# Patient Record
Sex: MALE | Race: WHITE | NOT HISPANIC OR LATINO | Employment: OTHER | URBAN - NONMETROPOLITAN AREA
[De-identification: names, ages, dates, MRNs, and addresses within clinical notes are randomized per-mention and may not be internally consistent; named-entity substitution may affect disease eponyms.]

---

## 2018-05-21 ENCOUNTER — APPOINTMENT (OUTPATIENT)
Dept: GENERAL RADIOLOGY | Facility: HOSPITAL | Age: 68
End: 2018-05-21

## 2018-05-21 ENCOUNTER — ANESTHESIA (OUTPATIENT)
Dept: PERIOP | Facility: HOSPITAL | Age: 68
End: 2018-05-21

## 2018-05-21 ENCOUNTER — HOSPITAL ENCOUNTER (INPATIENT)
Facility: HOSPITAL | Age: 68
LOS: 4 days | Discharge: HOME HEALTH | End: 2018-05-25
Attending: EMERGENCY MEDICINE | Admitting: ORTHOPAEDIC SURGERY

## 2018-05-21 ENCOUNTER — ANESTHESIA EVENT (OUTPATIENT)
Dept: PERIOP | Facility: HOSPITAL | Age: 68
End: 2018-05-21

## 2018-05-21 DIAGNOSIS — S72.355B: ICD-10-CM

## 2018-05-21 DIAGNOSIS — Z74.09 IMPAIRED MOBILITY: ICD-10-CM

## 2018-05-21 DIAGNOSIS — S81.832A: ICD-10-CM
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
## 2018-05-21 DIAGNOSIS — Z78.9 DECREASED ACTIVITIES OF DAILY LIVING (ADL): ICD-10-CM

## 2018-05-21 DIAGNOSIS — S72.352B: ICD-10-CM

## 2018-05-21 LAB
ABO GROUP BLD: NORMAL
ALBUMIN SERPL-MCNC: 4.1 G/DL (ref 3.5–5)
ALBUMIN/GLOB SERPL: 1.4 G/DL (ref 1.1–2.5)
ALP SERPL-CCNC: 76 U/L (ref 24–120)
ALT SERPL W P-5'-P-CCNC: 25 U/L (ref 0–54)
ANION GAP SERPL CALCULATED.3IONS-SCNC: 14 MMOL/L (ref 4–13)
AST SERPL-CCNC: 39 U/L (ref 7–45)
BASOPHILS # BLD AUTO: 0.08 10*3/MM3 (ref 0–0.2)
BASOPHILS NFR BLD AUTO: 0.6 % (ref 0–2)
BILIRUB SERPL-MCNC: 0.5 MG/DL (ref 0.1–1)
BLD GP AB SCN SERPL QL: NEGATIVE
BUN BLD-MCNC: 19 MG/DL (ref 5–21)
BUN/CREAT SERPL: 12 (ref 7–25)
CALCIUM SPEC-SCNC: 9.1 MG/DL (ref 8.4–10.4)
CHLORIDE SERPL-SCNC: 100 MMOL/L (ref 98–110)
CO2 SERPL-SCNC: 23 MMOL/L (ref 24–31)
CREAT BLD-MCNC: 1.58 MG/DL (ref 0.5–1.4)
DEPRECATED RDW RBC AUTO: 41.8 FL (ref 40–54)
EOSINOPHIL # BLD AUTO: 0.13 10*3/MM3 (ref 0–0.7)
EOSINOPHIL NFR BLD AUTO: 1 % (ref 0–4)
ERYTHROCYTE [DISTWIDTH] IN BLOOD BY AUTOMATED COUNT: 13.5 % (ref 12–15)
ETHANOL UR QL: <0.01 %
GFR SERPL CREATININE-BSD FRML MDRD: 44 ML/MIN/1.73
GLOBULIN UR ELPH-MCNC: 2.9 GM/DL
GLUCOSE BLD-MCNC: 143 MG/DL (ref 70–100)
HCT VFR BLD AUTO: 39.9 % (ref 40–52)
HGB BLD-MCNC: 13.6 G/DL (ref 14–18)
HOLD SPECIMEN: NORMAL
HOLD SPECIMEN: NORMAL
IMM GRANULOCYTES # BLD: 0.06 10*3/MM3 (ref 0–0.03)
IMM GRANULOCYTES NFR BLD: 0.5 % (ref 0–5)
INR PPP: 1.01 (ref 0.91–1.09)
LYMPHOCYTES # BLD AUTO: 1.88 10*3/MM3 (ref 0.72–4.86)
LYMPHOCYTES NFR BLD AUTO: 14.6 % (ref 15–45)
MCH RBC QN AUTO: 28.9 PG (ref 28–32)
MCHC RBC AUTO-ENTMCNC: 34.1 G/DL (ref 33–36)
MCV RBC AUTO: 84.9 FL (ref 82–95)
MONOCYTES # BLD AUTO: 0.97 10*3/MM3 (ref 0.19–1.3)
MONOCYTES NFR BLD AUTO: 7.5 % (ref 4–12)
NEUTROPHILS # BLD AUTO: 9.73 10*3/MM3 (ref 1.87–8.4)
NEUTROPHILS NFR BLD AUTO: 75.8 % (ref 39–78)
NRBC BLD MANUAL-RTO: 0 /100 WBC (ref 0–0)
PLATELET # BLD AUTO: 237 10*3/MM3 (ref 130–400)
PMV BLD AUTO: 10.5 FL (ref 6–12)
POTASSIUM BLD-SCNC: 3.4 MMOL/L (ref 3.5–5.3)
PROT SERPL-MCNC: 7 G/DL (ref 6.3–8.7)
PROTHROMBIN TIME: 13.6 SECONDS (ref 11.9–14.6)
RBC # BLD AUTO: 4.7 10*6/MM3 (ref 4.8–5.9)
RH BLD: POSITIVE
SODIUM BLD-SCNC: 137 MMOL/L (ref 135–145)
T&S EXPIRATION DATE: NORMAL
WBC NRBC COR # BLD: 12.85 10*3/MM3 (ref 4.8–10.8)
WHOLE BLOOD HOLD SPECIMEN: NORMAL
WHOLE BLOOD HOLD SPECIMEN: NORMAL

## 2018-05-21 PROCEDURE — 25010000003 CEFAZOLIN PER 500 MG: Performed by: NURSE ANESTHETIST, CERTIFIED REGISTERED

## 2018-05-21 PROCEDURE — C1713 ANCHOR/SCREW BN/BN,TIS/BN: HCPCS | Performed by: ORTHOPAEDIC SURGERY

## 2018-05-21 PROCEDURE — 25010000002 GENTAMICIN PER 80 MG: Performed by: EMERGENCY MEDICINE

## 2018-05-21 PROCEDURE — 71045 X-RAY EXAM CHEST 1 VIEW: CPT

## 2018-05-21 PROCEDURE — 0QS906Z REPOSITION LEFT FEMORAL SHAFT WITH INTRAMEDULLARY INTERNAL FIXATION DEVICE, OPEN APPROACH: ICD-10-PCS | Performed by: ORTHOPAEDIC SURGERY

## 2018-05-21 PROCEDURE — 86901 BLOOD TYPING SEROLOGIC RH(D): CPT | Performed by: EMERGENCY MEDICINE

## 2018-05-21 PROCEDURE — 80053 COMPREHEN METABOLIC PANEL: CPT | Performed by: EMERGENCY MEDICINE

## 2018-05-21 PROCEDURE — 90715 TDAP VACCINE 7 YRS/> IM: CPT | Performed by: EMERGENCY MEDICINE

## 2018-05-21 PROCEDURE — 72170 X-RAY EXAM OF PELVIS: CPT

## 2018-05-21 PROCEDURE — 76000 FLUOROSCOPY <1 HR PHYS/QHP: CPT

## 2018-05-21 PROCEDURE — 94799 UNLISTED PULMONARY SVC/PX: CPT

## 2018-05-21 PROCEDURE — 85025 COMPLETE CBC W/AUTO DIFF WBC: CPT | Performed by: EMERGENCY MEDICINE

## 2018-05-21 PROCEDURE — 25010000002 MORPHINE SULFATE (PF) 2 MG/ML SOLUTION: Performed by: ANESTHESIOLOGY

## 2018-05-21 PROCEDURE — 99285 EMERGENCY DEPT VISIT HI MDM: CPT

## 2018-05-21 PROCEDURE — 90471 IMMUNIZATION ADMIN: CPT | Performed by: EMERGENCY MEDICINE

## 2018-05-21 PROCEDURE — 25010000002 PROPOFOL 10 MG/ML EMULSION: Performed by: NURSE ANESTHETIST, CERTIFIED REGISTERED

## 2018-05-21 PROCEDURE — 80307 DRUG TEST PRSMV CHEM ANLYZR: CPT | Performed by: EMERGENCY MEDICINE

## 2018-05-21 PROCEDURE — 25010000002 FENTANYL CITRATE (PF) 100 MCG/2ML SOLUTION: Performed by: ANESTHESIOLOGY

## 2018-05-21 PROCEDURE — 73552 X-RAY EXAM OF FEMUR 2/>: CPT

## 2018-05-21 PROCEDURE — 86850 RBC ANTIBODY SCREEN: CPT | Performed by: EMERGENCY MEDICINE

## 2018-05-21 PROCEDURE — 25010000003 CEFAZOLIN PER 500 MG: Performed by: EMERGENCY MEDICINE

## 2018-05-21 PROCEDURE — C1769 GUIDE WIRE: HCPCS | Performed by: ORTHOPAEDIC SURGERY

## 2018-05-21 PROCEDURE — 25010000002 FENTANYL CITRATE (PF) 250 MCG/5ML SOLUTION: Performed by: NURSE ANESTHETIST, CERTIFIED REGISTERED

## 2018-05-21 PROCEDURE — 25010000002 FENTANYL CITRATE (PF) 100 MCG/2ML SOLUTION: Performed by: EMERGENCY MEDICINE

## 2018-05-21 PROCEDURE — 25010000002 TDAP 5-2.5-18.5 LF-MCG/0.5 SUSPENSION: Performed by: EMERGENCY MEDICINE

## 2018-05-21 PROCEDURE — 85610 PROTHROMBIN TIME: CPT | Performed by: EMERGENCY MEDICINE

## 2018-05-21 PROCEDURE — 86900 BLOOD TYPING SEROLOGIC ABO: CPT | Performed by: EMERGENCY MEDICINE

## 2018-05-21 DEVICE — IMPLANTABLE DEVICE: Type: IMPLANTABLE DEVICE | Status: FUNCTIONAL

## 2018-05-21 DEVICE — SCRW LK STRDRV TI 5X 50MM STRL: Type: IMPLANTABLE DEVICE | Status: FUNCTIONAL

## 2018-05-21 DEVICE — BLD FEM FIX HELI TFN ADV 95MM STRL: Type: IMPLANTABLE DEVICE | Status: FUNCTIONAL

## 2018-05-21 RX ORDER — DOCUSATE SODIUM 100 MG/1
100 CAPSULE, LIQUID FILLED ORAL 2 TIMES DAILY PRN
Status: DISCONTINUED | OUTPATIENT
Start: 2018-05-21 | End: 2018-05-25 | Stop reason: HOSPADM

## 2018-05-21 RX ORDER — ONDANSETRON 2 MG/ML
4 INJECTION INTRAMUSCULAR; INTRAVENOUS EVERY 6 HOURS PRN
Status: DISCONTINUED | OUTPATIENT
Start: 2018-05-21 | End: 2018-05-25 | Stop reason: HOSPADM

## 2018-05-21 RX ORDER — PROPOFOL 10 MG/ML
VIAL (ML) INTRAVENOUS AS NEEDED
Status: DISCONTINUED | OUTPATIENT
Start: 2018-05-21 | End: 2018-05-21 | Stop reason: SURG

## 2018-05-21 RX ORDER — ONDANSETRON 4 MG/1
4 TABLET, ORALLY DISINTEGRATING ORAL EVERY 6 HOURS PRN
Status: DISCONTINUED | OUTPATIENT
Start: 2018-05-21 | End: 2018-05-25 | Stop reason: HOSPADM

## 2018-05-21 RX ORDER — METOCLOPRAMIDE HYDROCHLORIDE 5 MG/ML
5 INJECTION INTRAMUSCULAR; INTRAVENOUS
Status: DISCONTINUED | OUTPATIENT
Start: 2018-05-21 | End: 2018-05-21 | Stop reason: HOSPADM

## 2018-05-21 RX ORDER — HYDRALAZINE HYDROCHLORIDE 20 MG/ML
5 INJECTION INTRAMUSCULAR; INTRAVENOUS
Status: DISCONTINUED | OUTPATIENT
Start: 2018-05-21 | End: 2018-05-21 | Stop reason: HOSPADM

## 2018-05-21 RX ORDER — ONDANSETRON 4 MG/1
4 TABLET, FILM COATED ORAL EVERY 6 HOURS PRN
Status: DISCONTINUED | OUTPATIENT
Start: 2018-05-21 | End: 2018-05-25 | Stop reason: HOSPADM

## 2018-05-21 RX ORDER — LISINOPRIL 5 MG/1
5 TABLET ORAL
Status: DISCONTINUED | OUTPATIENT
Start: 2018-05-22 | End: 2018-05-22

## 2018-05-21 RX ORDER — SODIUM CHLORIDE, SODIUM LACTATE, POTASSIUM CHLORIDE, CALCIUM CHLORIDE 600; 310; 30; 20 MG/100ML; MG/100ML; MG/100ML; MG/100ML
100 INJECTION, SOLUTION INTRAVENOUS CONTINUOUS
Status: DISCONTINUED | OUTPATIENT
Start: 2018-05-21 | End: 2018-05-25 | Stop reason: HOSPADM

## 2018-05-21 RX ORDER — FLUMAZENIL 0.1 MG/ML
0.2 INJECTION INTRAVENOUS AS NEEDED
Status: DISCONTINUED | OUTPATIENT
Start: 2018-05-21 | End: 2018-05-21 | Stop reason: HOSPADM

## 2018-05-21 RX ORDER — CEFAZOLIN SODIUM 1 G/3ML
INJECTION, POWDER, FOR SOLUTION INTRAMUSCULAR; INTRAVENOUS AS NEEDED
Status: DISCONTINUED | OUTPATIENT
Start: 2018-05-21 | End: 2018-05-21 | Stop reason: SURG

## 2018-05-21 RX ORDER — SODIUM CHLORIDE 0.9 % (FLUSH) 0.9 %
1-10 SYRINGE (ML) INJECTION AS NEEDED
Status: CANCELLED | OUTPATIENT
Start: 2018-05-21

## 2018-05-21 RX ORDER — SENNA AND DOCUSATE SODIUM 50; 8.6 MG/1; MG/1
2 TABLET, FILM COATED ORAL 2 TIMES DAILY PRN
Status: DISCONTINUED | OUTPATIENT
Start: 2018-05-21 | End: 2018-05-25 | Stop reason: HOSPADM

## 2018-05-21 RX ORDER — SODIUM CHLORIDE 0.9 % (FLUSH) 0.9 %
10 SYRINGE (ML) INJECTION AS NEEDED
Status: DISCONTINUED | OUTPATIENT
Start: 2018-05-21 | End: 2018-05-25 | Stop reason: HOSPADM

## 2018-05-21 RX ORDER — OXYCODONE AND ACETAMINOPHEN 7.5; 325 MG/1; MG/1
1 TABLET ORAL EVERY 4 HOURS PRN
Status: DISCONTINUED | OUTPATIENT
Start: 2018-05-21 | End: 2018-05-25 | Stop reason: HOSPADM

## 2018-05-21 RX ORDER — FENTANYL CITRATE 50 UG/ML
25 INJECTION, SOLUTION INTRAMUSCULAR; INTRAVENOUS AS NEEDED
Status: DISCONTINUED | OUTPATIENT
Start: 2018-05-21 | End: 2018-05-21 | Stop reason: HOSPADM

## 2018-05-21 RX ORDER — MAGNESIUM HYDROXIDE 1200 MG/15ML
LIQUID ORAL AS NEEDED
Status: DISCONTINUED | OUTPATIENT
Start: 2018-05-21 | End: 2018-05-21 | Stop reason: HOSPADM

## 2018-05-21 RX ORDER — MORPHINE SULFATE 4 MG/ML
4 INJECTION, SOLUTION INTRAMUSCULAR; INTRAVENOUS
Status: DISCONTINUED | OUTPATIENT
Start: 2018-05-21 | End: 2018-05-23 | Stop reason: ALTCHOICE

## 2018-05-21 RX ORDER — OXYCODONE HCL 20 MG/1
20 TABLET, FILM COATED, EXTENDED RELEASE ORAL EVERY 12 HOURS SCHEDULED
Status: DISCONTINUED | OUTPATIENT
Start: 2018-05-21 | End: 2018-05-25 | Stop reason: HOSPADM

## 2018-05-21 RX ORDER — FENTANYL CITRATE 50 UG/ML
INJECTION, SOLUTION INTRAMUSCULAR; INTRAVENOUS
Status: COMPLETED
Start: 2018-05-21 | End: 2018-05-21

## 2018-05-21 RX ORDER — NALOXONE HCL 0.4 MG/ML
0.4 VIAL (ML) INJECTION
Status: DISCONTINUED | OUTPATIENT
Start: 2018-05-21 | End: 2018-05-23 | Stop reason: ALTCHOICE

## 2018-05-21 RX ORDER — PHENYLEPHRINE HCL IN 0.9% NACL 0.8MG/10ML
SYRINGE (ML) INTRAVENOUS AS NEEDED
Status: DISCONTINUED | OUTPATIENT
Start: 2018-05-21 | End: 2018-05-21 | Stop reason: SURG

## 2018-05-21 RX ORDER — SODIUM CHLORIDE 0.9 % (FLUSH) 0.9 %
1-10 SYRINGE (ML) INJECTION AS NEEDED
Status: DISCONTINUED | OUTPATIENT
Start: 2018-05-21 | End: 2018-05-25 | Stop reason: HOSPADM

## 2018-05-21 RX ORDER — LABETALOL HYDROCHLORIDE 5 MG/ML
5 INJECTION, SOLUTION INTRAVENOUS
Status: DISCONTINUED | OUTPATIENT
Start: 2018-05-21 | End: 2018-05-21 | Stop reason: HOSPADM

## 2018-05-21 RX ORDER — NALOXONE HCL 0.4 MG/ML
0.04 VIAL (ML) INJECTION AS NEEDED
Status: DISCONTINUED | OUTPATIENT
Start: 2018-05-21 | End: 2018-05-21 | Stop reason: HOSPADM

## 2018-05-21 RX ORDER — MORPHINE SULFATE 10 MG/ML
6 INJECTION INTRAMUSCULAR; INTRAVENOUS; SUBCUTANEOUS
Status: DISCONTINUED | OUTPATIENT
Start: 2018-05-21 | End: 2018-05-23 | Stop reason: CLARIF

## 2018-05-21 RX ORDER — ONDANSETRON 2 MG/ML
4 INJECTION INTRAMUSCULAR; INTRAVENOUS AS NEEDED
Status: DISCONTINUED | OUTPATIENT
Start: 2018-05-21 | End: 2018-05-21 | Stop reason: HOSPADM

## 2018-05-21 RX ORDER — FAMOTIDINE 20 MG/1
40 TABLET, FILM COATED ORAL DAILY
Status: DISCONTINUED | OUTPATIENT
Start: 2018-05-22 | End: 2018-05-25 | Stop reason: HOSPADM

## 2018-05-21 RX ORDER — MEPERIDINE HYDROCHLORIDE 50 MG/ML
12.5 INJECTION INTRAMUSCULAR; INTRAVENOUS; SUBCUTANEOUS
Status: DISCONTINUED | OUTPATIENT
Start: 2018-05-21 | End: 2018-05-21 | Stop reason: HOSPADM

## 2018-05-21 RX ORDER — DIAZEPAM 5 MG/1
5 TABLET ORAL EVERY 6 HOURS PRN
Status: DISCONTINUED | OUTPATIENT
Start: 2018-05-21 | End: 2018-05-25 | Stop reason: HOSPADM

## 2018-05-21 RX ORDER — FENTANYL CITRATE 50 UG/ML
100 INJECTION, SOLUTION INTRAMUSCULAR; INTRAVENOUS ONCE
Status: COMPLETED | OUTPATIENT
Start: 2018-05-21 | End: 2018-05-21

## 2018-05-21 RX ORDER — IPRATROPIUM BROMIDE AND ALBUTEROL SULFATE 2.5; .5 MG/3ML; MG/3ML
3 SOLUTION RESPIRATORY (INHALATION) ONCE AS NEEDED
Status: DISCONTINUED | OUTPATIENT
Start: 2018-05-21 | End: 2018-05-21 | Stop reason: HOSPADM

## 2018-05-21 RX ORDER — FENTANYL CITRATE 50 UG/ML
INJECTION, SOLUTION INTRAMUSCULAR; INTRAVENOUS AS NEEDED
Status: DISCONTINUED | OUTPATIENT
Start: 2018-05-21 | End: 2018-05-21 | Stop reason: SURG

## 2018-05-21 RX ORDER — ROCURONIUM BROMIDE 10 MG/ML
INJECTION, SOLUTION INTRAVENOUS AS NEEDED
Status: DISCONTINUED | OUTPATIENT
Start: 2018-05-21 | End: 2018-05-21 | Stop reason: SURG

## 2018-05-21 RX ORDER — NALOXONE HCL 0.4 MG/ML
0.4 VIAL (ML) INJECTION
Status: DISCONTINUED | OUTPATIENT
Start: 2018-05-21 | End: 2018-05-25 | Stop reason: HOSPADM

## 2018-05-21 RX ORDER — OXYCODONE AND ACETAMINOPHEN 10; 325 MG/1; MG/1
1 TABLET ORAL ONCE AS NEEDED
Status: COMPLETED | OUTPATIENT
Start: 2018-05-21 | End: 2018-05-21

## 2018-05-21 RX ORDER — SODIUM CHLORIDE, SODIUM LACTATE, POTASSIUM CHLORIDE, CALCIUM CHLORIDE 600; 310; 30; 20 MG/100ML; MG/100ML; MG/100ML; MG/100ML
INJECTION, SOLUTION INTRAVENOUS CONTINUOUS PRN
Status: DISCONTINUED | OUTPATIENT
Start: 2018-05-21 | End: 2018-05-21 | Stop reason: SURG

## 2018-05-21 RX ORDER — MORPHINE SULFATE 2 MG/ML
2 INJECTION, SOLUTION INTRAMUSCULAR; INTRAVENOUS
Status: COMPLETED | OUTPATIENT
Start: 2018-05-21 | End: 2018-05-21

## 2018-05-21 RX ORDER — OXYCODONE AND ACETAMINOPHEN 7.5; 325 MG/1; MG/1
2 TABLET ORAL EVERY 4 HOURS PRN
Status: DISCONTINUED | OUTPATIENT
Start: 2018-05-21 | End: 2018-05-25 | Stop reason: HOSPADM

## 2018-05-21 RX ADMIN — MEPERIDINE HYDROCHLORIDE 50 MG: 50 INJECTION INTRAMUSCULAR; INTRAVENOUS; SUBCUTANEOUS at 21:57

## 2018-05-21 RX ADMIN — Medication 160 MCG: at 19:50

## 2018-05-21 RX ADMIN — FENTANYL CITRATE 150 MCG: 50 INJECTION INTRAMUSCULAR; INTRAVENOUS at 19:35

## 2018-05-21 RX ADMIN — MORPHINE SULFATE 2 MG: 2 INJECTION, SOLUTION INTRAMUSCULAR; INTRAVENOUS at 22:10

## 2018-05-21 RX ADMIN — SODIUM CHLORIDE, POTASSIUM CHLORIDE, SODIUM LACTATE AND CALCIUM CHLORIDE: 600; 310; 30; 20 INJECTION, SOLUTION INTRAVENOUS at 21:05

## 2018-05-21 RX ADMIN — MORPHINE SULFATE 2 MG: 2 INJECTION, SOLUTION INTRAMUSCULAR; INTRAVENOUS at 22:30

## 2018-05-21 RX ADMIN — FENTANYL CITRATE 100 MCG: 50 INJECTION INTRAMUSCULAR; INTRAVENOUS at 20:08

## 2018-05-21 RX ADMIN — ROCURONIUM BROMIDE 5 MG: 10 INJECTION INTRAVENOUS at 19:35

## 2018-05-21 RX ADMIN — MORPHINE SULFATE 2 MG: 2 INJECTION, SOLUTION INTRAMUSCULAR; INTRAVENOUS at 22:20

## 2018-05-21 RX ADMIN — GENTAMICIN SULFATE 390 MG: 40 INJECTION, SOLUTION INTRAMUSCULAR; INTRAVENOUS at 18:25

## 2018-05-21 RX ADMIN — SODIUM CHLORIDE, POTASSIUM CHLORIDE, SODIUM LACTATE AND CALCIUM CHLORIDE: 600; 310; 30; 20 INJECTION, SOLUTION INTRAVENOUS at 19:33

## 2018-05-21 RX ADMIN — OXYCODONE HYDROCHLORIDE AND ACETAMINOPHEN 1 TABLET: 10; 325 TABLET ORAL at 22:36

## 2018-05-21 RX ADMIN — OXYCODONE HYDROCHLORIDE 20 MG: 20 TABLET, FILM COATED, EXTENDED RELEASE ORAL at 23:23

## 2018-05-21 RX ADMIN — CEFAZOLIN 2 G: 1 INJECTION, POWDER, FOR SOLUTION INTRAVENOUS at 19:35

## 2018-05-21 RX ADMIN — FENTANYL CITRATE 100 MCG: 50 INJECTION, SOLUTION INTRAMUSCULAR; INTRAVENOUS at 17:40

## 2018-05-21 RX ADMIN — CEFAZOLIN 1 G: 1 INJECTION, POWDER, FOR SOLUTION INTRAVENOUS at 18:09

## 2018-05-21 RX ADMIN — MORPHINE SULFATE 2 MG: 2 INJECTION, SOLUTION INTRAMUSCULAR; INTRAVENOUS at 21:31

## 2018-05-21 RX ADMIN — DIAZEPAM 5 MG: 5 TABLET ORAL at 23:23

## 2018-05-21 RX ADMIN — MORPHINE SULFATE 2 MG: 2 INJECTION, SOLUTION INTRAMUSCULAR; INTRAVENOUS at 21:21

## 2018-05-21 RX ADMIN — PROPOFOL 150 MG: 10 INJECTION, EMULSION INTRAVENOUS at 19:35

## 2018-05-21 RX ADMIN — FENTANYL CITRATE 100 MCG: 50 INJECTION, SOLUTION INTRAMUSCULAR; INTRAVENOUS at 21:33

## 2018-05-21 RX ADMIN — FENTANYL CITRATE 100 MCG: 50 INJECTION INTRAMUSCULAR; INTRAVENOUS at 18:23

## 2018-05-21 RX ADMIN — TETANUS TOXOID, REDUCED DIPHTHERIA TOXOID AND ACELLULAR PERTUSSIS VACCINE, ADSORBED 0.5 ML: 5; 2.5; 8; 8; 2.5 SUSPENSION INTRAMUSCULAR at 18:07

## 2018-05-21 RX ADMIN — Medication 160 MCG: at 19:55

## 2018-05-21 RX ADMIN — EPHEDRINE SULFATE 25 MG: 50 INJECTION INTRAMUSCULAR; INTRAVENOUS; SUBCUTANEOUS at 19:59

## 2018-05-21 RX ADMIN — SODIUM CHLORIDE, POTASSIUM CHLORIDE, SODIUM LACTATE AND CALCIUM CHLORIDE 100 ML/HR: 600; 310; 30; 20 INJECTION, SOLUTION INTRAVENOUS at 22:16

## 2018-05-21 RX ADMIN — Medication 160 MCG: at 19:58

## 2018-05-22 LAB
ANION GAP SERPL CALCULATED.3IONS-SCNC: 8 MMOL/L (ref 4–13)
BUN BLD-MCNC: 18 MG/DL (ref 5–21)
BUN/CREAT SERPL: 14.4 (ref 7–25)
CALCIUM SPEC-SCNC: 8 MG/DL (ref 8.4–10.4)
CHLORIDE SERPL-SCNC: 103 MMOL/L (ref 98–110)
CO2 SERPL-SCNC: 25 MMOL/L (ref 24–31)
CREAT BLD-MCNC: 1.25 MG/DL (ref 0.5–1.4)
DEPRECATED RDW RBC AUTO: 42.6 FL (ref 40–54)
ERYTHROCYTE [DISTWIDTH] IN BLOOD BY AUTOMATED COUNT: 13.8 % (ref 12–15)
GFR SERPL CREATININE-BSD FRML MDRD: 57 ML/MIN/1.73
GLUCOSE BLD-MCNC: 141 MG/DL (ref 70–100)
HCT VFR BLD AUTO: 33.5 % (ref 40–52)
HGB BLD-MCNC: 11.5 G/DL (ref 14–18)
MCH RBC QN AUTO: 29.1 PG (ref 28–32)
MCHC RBC AUTO-ENTMCNC: 34.3 G/DL (ref 33–36)
MCV RBC AUTO: 84.8 FL (ref 82–95)
PLATELET # BLD AUTO: 219 10*3/MM3 (ref 130–400)
PMV BLD AUTO: 11.3 FL (ref 6–12)
POTASSIUM BLD-SCNC: 5.1 MMOL/L (ref 3.5–5.3)
RBC # BLD AUTO: 3.95 10*6/MM3 (ref 4.8–5.9)
SODIUM BLD-SCNC: 136 MMOL/L (ref 135–145)
WBC NRBC COR # BLD: 8.17 10*3/MM3 (ref 4.8–10.8)

## 2018-05-22 PROCEDURE — 97116 GAIT TRAINING THERAPY: CPT

## 2018-05-22 PROCEDURE — 25010000003 CEFAZOLIN PER 500 MG: Performed by: ORTHOPAEDIC SURGERY

## 2018-05-22 PROCEDURE — 97161 PT EVAL LOW COMPLEX 20 MIN: CPT

## 2018-05-22 PROCEDURE — 97165 OT EVAL LOW COMPLEX 30 MIN: CPT

## 2018-05-22 PROCEDURE — G8979 MOBILITY GOAL STATUS: HCPCS

## 2018-05-22 PROCEDURE — 25010000002 ENOXAPARIN PER 10 MG: Performed by: ORTHOPAEDIC SURGERY

## 2018-05-22 PROCEDURE — 25010000002 MORPHINE PER 10 MG: Performed by: ORTHOPAEDIC SURGERY

## 2018-05-22 PROCEDURE — G8978 MOBILITY CURRENT STATUS: HCPCS

## 2018-05-22 PROCEDURE — G8988 SELF CARE GOAL STATUS: HCPCS

## 2018-05-22 PROCEDURE — 85027 COMPLETE CBC AUTOMATED: CPT | Performed by: ORTHOPAEDIC SURGERY

## 2018-05-22 PROCEDURE — 80048 BASIC METABOLIC PNL TOTAL CA: CPT | Performed by: ORTHOPAEDIC SURGERY

## 2018-05-22 PROCEDURE — G8987 SELF CARE CURRENT STATUS: HCPCS

## 2018-05-22 PROCEDURE — P9612 CATHETERIZE FOR URINE SPEC: HCPCS

## 2018-05-22 RX ORDER — AMLODIPINE BESYLATE 5 MG/1
5 TABLET ORAL NIGHTLY
COMMUNITY
End: 2019-06-03

## 2018-05-22 RX ORDER — ATORVASTATIN CALCIUM 40 MG/1
40 TABLET, FILM COATED ORAL NIGHTLY
Status: DISCONTINUED | OUTPATIENT
Start: 2018-05-22 | End: 2018-05-25 | Stop reason: HOSPADM

## 2018-05-22 RX ORDER — LISINOPRIL 20 MG/1
20 TABLET ORAL
Status: DISCONTINUED | OUTPATIENT
Start: 2018-05-23 | End: 2018-05-25 | Stop reason: HOSPADM

## 2018-05-22 RX ORDER — LISINOPRIL 20 MG/1
20 TABLET ORAL DAILY
COMMUNITY

## 2018-05-22 RX ORDER — AMLODIPINE BESYLATE 5 MG/1
5 TABLET ORAL
Status: DISCONTINUED | OUTPATIENT
Start: 2018-05-22 | End: 2018-05-25 | Stop reason: HOSPADM

## 2018-05-22 RX ORDER — SPIRONOLACTONE 25 MG/1
25 TABLET ORAL DAILY
Status: DISCONTINUED | OUTPATIENT
Start: 2018-05-22 | End: 2018-05-25 | Stop reason: HOSPADM

## 2018-05-22 RX ORDER — SPIRONOLACTONE 25 MG/1
25 TABLET ORAL DAILY
COMMUNITY
End: 2021-06-14

## 2018-05-22 RX ORDER — ATORVASTATIN CALCIUM 40 MG/1
40 TABLET, FILM COATED ORAL DAILY
COMMUNITY

## 2018-05-22 RX ADMIN — SODIUM CHLORIDE, POTASSIUM CHLORIDE, SODIUM LACTATE AND CALCIUM CHLORIDE 100 ML/HR: 600; 310; 30; 20 INJECTION, SOLUTION INTRAVENOUS at 17:34

## 2018-05-22 RX ADMIN — CEFAZOLIN SODIUM 2 G: 1 INJECTION, POWDER, FOR SOLUTION INTRAMUSCULAR; INTRAVENOUS at 04:16

## 2018-05-22 RX ADMIN — SODIUM CHLORIDE, POTASSIUM CHLORIDE, SODIUM LACTATE AND CALCIUM CHLORIDE 100 ML/HR: 600; 310; 30; 20 INJECTION, SOLUTION INTRAVENOUS at 00:04

## 2018-05-22 RX ADMIN — MORPHINE SULFATE 6 MG: 10 INJECTION, SOLUTION INTRAMUSCULAR; INTRAVENOUS at 00:13

## 2018-05-22 RX ADMIN — CEFAZOLIN SODIUM 2 G: 1 INJECTION, POWDER, FOR SOLUTION INTRAMUSCULAR; INTRAVENOUS at 11:26

## 2018-05-22 RX ADMIN — FAMOTIDINE 40 MG: 20 TABLET, FILM COATED ORAL at 08:06

## 2018-05-22 RX ADMIN — OXYCODONE HYDROCHLORIDE 20 MG: 20 TABLET, FILM COATED, EXTENDED RELEASE ORAL at 20:47

## 2018-05-22 RX ADMIN — OXYCODONE HYDROCHLORIDE 20 MG: 20 TABLET, FILM COATED, EXTENDED RELEASE ORAL at 08:06

## 2018-05-22 RX ADMIN — ENOXAPARIN SODIUM 40 MG: 40 INJECTION SUBCUTANEOUS at 08:07

## 2018-05-22 RX ADMIN — OXYCODONE HYDROCHLORIDE AND ACETAMINOPHEN 1 TABLET: 7.5; 325 TABLET ORAL at 20:46

## 2018-05-22 NOTE — ANESTHESIA POSTPROCEDURE EVALUATION
Patient: Michele Harvey    Procedure Summary     Date:  05/21/18 Room / Location:  [facility] OR  /  [facility] OR    Anesthesia Start:  1933 Anesthesia Stop:  2115    Procedure:  FEMUR INTRAMEDULLARY NAILING/RODDING (Left Thigh) Diagnosis:      Surgeon:  Hilario Owen MD Provider:  Shankar Rico CRNA    Anesthesia Type:  general ASA Status:  2 - Emergent          Anesthesia Type: general  Last vitals  BP   106/60 (05/22/18 0505)   Temp   97.9 °F (36.6 °C) (05/22/18 0405)   Pulse   69 (05/22/18 0505)   Resp   17 (05/22/18 0405)     SpO2   92 % (05/22/18 0505)     Post Anesthesia Care and Evaluation    Patient location during evaluation: PACU  Level of consciousness: awake  Pain management: adequate  Airway patency: patent  Anesthetic complications: No anesthetic complications  PONV Status: none  Cardiovascular status: acceptable  Respiratory status: acceptable  Hydration status: acceptable

## 2018-05-22 NOTE — ANESTHESIA PROCEDURE NOTES
Airway  Urgency: emergent    Airway not difficult    General Information and Staff    Patient location during procedure: OR  CRNA: CHAPITO MAE    Consent for Airway (if performed for an anesthetic, see related documentation for consents)  Patient identity confirmed: verbally with patient and arm band  Consent: No emergent situation. Verbal consent obtained. Written consent obtained.  Risks and benefits: risks, benefits and alternatives were discussed  Consent given by: patient      Indications and Patient Condition  Indications for airway management: airway protection    Preoxygenated: yes  Mask difficulty assessment: 1 - vent by mask    Final Airway Details  Final airway type: endotracheal airway      Successful airway: ETT    Successful intubation technique: direct laryngoscopy  Facilitating devices/methods: intubating stylet  Endotracheal tube insertion site: oral  Blade: Fabian  Blade size: 3.5.  ETT size: 7.5 mm  Cormack-Lehane Classification: grade I - full view of glottis  Placement verified by: chest auscultation and capnometry   Measured from: lips  ETT to lips (cm): 22  Number of attempts at approach: 1

## 2018-05-23 PROCEDURE — 94799 UNLISTED PULMONARY SVC/PX: CPT

## 2018-05-23 PROCEDURE — 97535 SELF CARE MNGMENT TRAINING: CPT

## 2018-05-23 PROCEDURE — 97110 THERAPEUTIC EXERCISES: CPT

## 2018-05-23 PROCEDURE — 94760 N-INVAS EAR/PLS OXIMETRY 1: CPT

## 2018-05-23 PROCEDURE — 25010000002 ENOXAPARIN PER 10 MG: Performed by: ORTHOPAEDIC SURGERY

## 2018-05-23 PROCEDURE — 97116 GAIT TRAINING THERAPY: CPT

## 2018-05-23 RX ORDER — DIPHENHYDRAMINE HCL 25 MG
25 CAPSULE ORAL EVERY 6 HOURS PRN
Status: DISCONTINUED | OUTPATIENT
Start: 2018-05-23 | End: 2018-05-25 | Stop reason: HOSPADM

## 2018-05-23 RX ADMIN — SODIUM CHLORIDE, POTASSIUM CHLORIDE, SODIUM LACTATE AND CALCIUM CHLORIDE 100 ML/HR: 600; 310; 30; 20 INJECTION, SOLUTION INTRAVENOUS at 16:16

## 2018-05-23 RX ADMIN — FAMOTIDINE 40 MG: 20 TABLET, FILM COATED ORAL at 08:49

## 2018-05-23 RX ADMIN — SODIUM CHLORIDE, POTASSIUM CHLORIDE, SODIUM LACTATE AND CALCIUM CHLORIDE 100 ML/HR: 600; 310; 30; 20 INJECTION, SOLUTION INTRAVENOUS at 04:15

## 2018-05-23 RX ADMIN — OXYCODONE HYDROCHLORIDE AND ACETAMINOPHEN 1 TABLET: 7.5; 325 TABLET ORAL at 15:07

## 2018-05-23 RX ADMIN — OXYCODONE HYDROCHLORIDE 20 MG: 20 TABLET, FILM COATED, EXTENDED RELEASE ORAL at 20:46

## 2018-05-23 RX ADMIN — LISINOPRIL 20 MG: 20 TABLET ORAL at 09:00

## 2018-05-23 RX ADMIN — OXYCODONE HYDROCHLORIDE 20 MG: 20 TABLET, FILM COATED, EXTENDED RELEASE ORAL at 00:00

## 2018-05-23 RX ADMIN — DESMOPRESSIN ACETATE 40 MG: 0.2 TABLET ORAL at 20:46

## 2018-05-23 RX ADMIN — AMLODIPINE BESYLATE 5 MG: 5 TABLET ORAL at 08:49

## 2018-05-23 RX ADMIN — OXYCODONE HYDROCHLORIDE AND ACETAMINOPHEN 1 TABLET: 7.5; 325 TABLET ORAL at 10:52

## 2018-05-23 RX ADMIN — ENOXAPARIN SODIUM 40 MG: 40 INJECTION SUBCUTANEOUS at 09:00

## 2018-05-23 RX ADMIN — SPIRONOLACTONE 25 MG: 25 TABLET ORAL at 08:49

## 2018-05-24 LAB
ANION GAP SERPL CALCULATED.3IONS-SCNC: 5 MMOL/L (ref 4–13)
BUN BLD-MCNC: 15 MG/DL (ref 5–21)
BUN/CREAT SERPL: 12.3 (ref 7–25)
CALCIUM SPEC-SCNC: 8.6 MG/DL (ref 8.4–10.4)
CHLORIDE SERPL-SCNC: 97 MMOL/L (ref 98–110)
CO2 SERPL-SCNC: 33 MMOL/L (ref 24–31)
CREAT BLD-MCNC: 1.22 MG/DL (ref 0.5–1.4)
DEPRECATED RDW RBC AUTO: 43.8 FL (ref 40–54)
ERYTHROCYTE [DISTWIDTH] IN BLOOD BY AUTOMATED COUNT: 13.8 % (ref 12–15)
GFR SERPL CREATININE-BSD FRML MDRD: 59 ML/MIN/1.73
GLUCOSE BLD-MCNC: 104 MG/DL (ref 70–100)
HCT VFR BLD AUTO: 29.4 % (ref 40–52)
HGB BLD-MCNC: 9.7 G/DL (ref 14–18)
MCH RBC QN AUTO: 28.7 PG (ref 28–32)
MCHC RBC AUTO-ENTMCNC: 33 G/DL (ref 33–36)
MCV RBC AUTO: 87 FL (ref 82–95)
PLATELET # BLD AUTO: 157 10*3/MM3 (ref 130–400)
PMV BLD AUTO: 11.1 FL (ref 6–12)
POTASSIUM BLD-SCNC: 4.2 MMOL/L (ref 3.5–5.3)
RBC # BLD AUTO: 3.38 10*6/MM3 (ref 4.8–5.9)
SODIUM BLD-SCNC: 135 MMOL/L (ref 135–145)
WBC NRBC COR # BLD: 7.65 10*3/MM3 (ref 4.8–10.8)

## 2018-05-24 PROCEDURE — 97116 GAIT TRAINING THERAPY: CPT

## 2018-05-24 PROCEDURE — 25010000002 ENOXAPARIN PER 10 MG: Performed by: ORTHOPAEDIC SURGERY

## 2018-05-24 PROCEDURE — 80048 BASIC METABOLIC PNL TOTAL CA: CPT | Performed by: ORTHOPAEDIC SURGERY

## 2018-05-24 PROCEDURE — 97530 THERAPEUTIC ACTIVITIES: CPT

## 2018-05-24 PROCEDURE — 85027 COMPLETE CBC AUTOMATED: CPT | Performed by: ORTHOPAEDIC SURGERY

## 2018-05-24 RX ADMIN — OXYCODONE HYDROCHLORIDE 20 MG: 20 TABLET, FILM COATED, EXTENDED RELEASE ORAL at 09:12

## 2018-05-24 RX ADMIN — FAMOTIDINE 40 MG: 20 TABLET, FILM COATED ORAL at 09:11

## 2018-05-24 RX ADMIN — SPIRONOLACTONE 25 MG: 25 TABLET ORAL at 09:11

## 2018-05-24 RX ADMIN — LISINOPRIL 20 MG: 20 TABLET ORAL at 09:11

## 2018-05-24 RX ADMIN — DIAZEPAM 5 MG: 5 TABLET ORAL at 00:11

## 2018-05-24 RX ADMIN — DESMOPRESSIN ACETATE 40 MG: 0.2 TABLET ORAL at 20:54

## 2018-05-24 RX ADMIN — OXYCODONE HYDROCHLORIDE AND ACETAMINOPHEN 1 TABLET: 7.5; 325 TABLET ORAL at 00:11

## 2018-05-24 RX ADMIN — AMLODIPINE BESYLATE 5 MG: 5 TABLET ORAL at 09:11

## 2018-05-24 RX ADMIN — OXYCODONE HYDROCHLORIDE AND ACETAMINOPHEN 2 TABLET: 7.5; 325 TABLET ORAL at 18:43

## 2018-05-24 RX ADMIN — OXYCODONE HYDROCHLORIDE 20 MG: 20 TABLET, FILM COATED, EXTENDED RELEASE ORAL at 20:52

## 2018-05-24 RX ADMIN — ENOXAPARIN SODIUM 40 MG: 40 INJECTION SUBCUTANEOUS at 09:11

## 2018-05-25 VITALS
OXYGEN SATURATION: 99 % | RESPIRATION RATE: 20 BRPM | TEMPERATURE: 98.2 F | DIASTOLIC BLOOD PRESSURE: 48 MMHG | BODY MASS INDEX: 29.81 KG/M2 | HEIGHT: 72 IN | HEART RATE: 76 BPM | WEIGHT: 220.1 LBS | SYSTOLIC BLOOD PRESSURE: 133 MMHG

## 2018-05-25 LAB
HCT VFR BLD AUTO: 28.1 % (ref 40–52)
HGB BLD-MCNC: 9.3 G/DL (ref 14–18)

## 2018-05-25 PROCEDURE — 85018 HEMOGLOBIN: CPT | Performed by: PHYSICIAN ASSISTANT

## 2018-05-25 PROCEDURE — 85014 HEMATOCRIT: CPT | Performed by: PHYSICIAN ASSISTANT

## 2018-05-25 PROCEDURE — 97530 THERAPEUTIC ACTIVITIES: CPT

## 2018-05-25 PROCEDURE — 97116 GAIT TRAINING THERAPY: CPT

## 2018-05-25 PROCEDURE — 97535 SELF CARE MNGMENT TRAINING: CPT

## 2018-05-25 PROCEDURE — 25010000002 ENOXAPARIN PER 10 MG: Performed by: ORTHOPAEDIC SURGERY

## 2018-05-25 RX ORDER — FERROUS ASPARTO GLYCINATE, IRON, ASCORBIC ACID, FOLIC ACID, CYANOCOBALAMIN, ZINC, SUCCINIC ACID, AND INTRINSIC FACTOR 50; 100; 60; 750; 250; 25; 15; 50; 100 MG/1; MG/1; MG/1; UG/1; UG/1; UG/1; MG/1; MG/1; MG/1
150 TABLET ORAL DAILY
Qty: 30 TABLET | Refills: 0 | Status: SHIPPED | OUTPATIENT
Start: 2018-05-25 | End: 2019-06-03

## 2018-05-25 RX ADMIN — OXYCODONE HYDROCHLORIDE AND ACETAMINOPHEN 1 TABLET: 7.5; 325 TABLET ORAL at 14:12

## 2018-05-25 RX ADMIN — OXYCODONE HYDROCHLORIDE 20 MG: 20 TABLET, FILM COATED, EXTENDED RELEASE ORAL at 09:08

## 2018-05-25 RX ADMIN — ENOXAPARIN SODIUM 40 MG: 40 INJECTION SUBCUTANEOUS at 09:07

## 2018-05-25 RX ADMIN — AMLODIPINE BESYLATE 5 MG: 5 TABLET ORAL at 09:08

## 2018-05-25 RX ADMIN — LISINOPRIL 20 MG: 20 TABLET ORAL at 09:08

## 2018-05-25 RX ADMIN — DOCUSATE SODIUM AND SENNOSIDES 2 TABLET: 8.6; 5 TABLET, FILM COATED ORAL at 05:46

## 2018-05-25 RX ADMIN — SPIRONOLACTONE 25 MG: 25 TABLET ORAL at 09:08

## 2018-05-25 RX ADMIN — FAMOTIDINE 40 MG: 20 TABLET, FILM COATED ORAL at 09:08

## 2019-04-24 ENCOUNTER — OFFICE VISIT (OUTPATIENT)
Dept: UROLOGY | Facility: CLINIC | Age: 69
End: 2019-04-24

## 2019-04-24 VITALS — TEMPERATURE: 97.6 F | HEIGHT: 72 IN | WEIGHT: 220 LBS | BODY MASS INDEX: 29.8 KG/M2

## 2019-04-24 DIAGNOSIS — R97.20 ELEVATED PSA: Primary | ICD-10-CM

## 2019-04-24 DIAGNOSIS — R79.89 LOW TESTOSTERONE: ICD-10-CM

## 2019-04-24 LAB
BILIRUB BLD-MCNC: NEGATIVE MG/DL
CLARITY, POC: CLEAR
COLOR UR: YELLOW
GLUCOSE UR STRIP-MCNC: NEGATIVE MG/DL
KETONES UR QL: NEGATIVE
LEUKOCYTE EST, POC: ABNORMAL
NITRITE UR-MCNC: NEGATIVE MG/ML
PH UR: 6 [PH] (ref 5–8)
PROT UR STRIP-MCNC: NEGATIVE MG/DL
RBC # UR STRIP: ABNORMAL /UL
SP GR UR: 1.02 (ref 1–1.03)
UROBILINOGEN UR QL: NORMAL

## 2019-04-24 PROCEDURE — 99204 OFFICE O/P NEW MOD 45 MIN: CPT | Performed by: UROLOGY

## 2019-04-24 PROCEDURE — 81003 URINALYSIS AUTO W/O SCOPE: CPT | Performed by: UROLOGY

## 2019-04-24 RX ORDER — TAMSULOSIN HYDROCHLORIDE 0.4 MG/1
1 CAPSULE ORAL NIGHTLY
COMMUNITY

## 2019-04-24 RX ORDER — TESTOSTERONE CYPIONATE 200 MG/ML
VIAL (ML) INTRAMUSCULAR
COMMUNITY
End: 2019-06-03

## 2019-04-24 RX ORDER — SILDENAFIL CITRATE 20 MG/1
20 TABLET ORAL AS NEEDED
COMMUNITY

## 2019-04-24 NOTE — PROGRESS NOTES
Mr. Harvey is 68 y.o. male    Chief Complaint   Patient presents with   • Elevated PSA       History of Present Illness  Elevated PSA  Patient is here with an elevated PSA. He has no personal history of prostate cancer. His AUA Symptom Score is 6/35, manifested as obstructive symptoms including intermittency. He has a prior genitourinary history of prostate biopsy.  Previous PSA values are :   5.5   He reports intermittency. He denies frequency and urgency. Patient states symptoms are of mild severity. Onset of symptoms was several years ago and was gradual in onset.    The following portions of the patient's history were reviewed and updated as appropriate: allergies, current medications, past family history, past medical history, past social history, past surgical history and problem list.    Review of Systems   Constitutional: Positive for fatigue. Negative for appetite change and fever.   HENT: Negative for hearing loss and sore throat.    Eyes: Negative for pain and redness.   Respiratory: Negative for cough and shortness of breath.    Cardiovascular: Negative for chest pain and leg swelling.   Gastrointestinal: Negative for anal bleeding, nausea and vomiting.   Endocrine: Negative for cold intolerance and heat intolerance.   Genitourinary: Negative for dysuria, flank pain and hematuria.   Musculoskeletal: Negative for joint swelling and myalgias.   Skin: Negative for color change and rash.   Allergic/Immunologic: Negative for immunocompromised state.   Neurological: Negative for dizziness and speech difficulty.   Hematological: Negative for adenopathy. Does not bruise/bleed easily.   Psychiatric/Behavioral: Negative for dysphoric mood and suicidal ideas.       I have reviewed the review of systems      Current Outpatient Medications:   •  atorvastatin (LIPITOR) 40 MG tablet, Take 40 mg by mouth Daily., Disp: , Rfl:   •  BIOTIN 5000 PO, Take  by mouth., Disp: , Rfl:   •  lisinopril (PRINIVIL,ZESTRIL) 20 MG  tablet, Take 20 mg by mouth Daily., Disp: , Rfl:   •  LUTEIN PO, Take  by mouth., Disp: , Rfl:   •  Misc Natural Products (PROSTATE HEALTH PO), Take 1 tablet by mouth Daily., Disp: , Rfl:   •  NON FORMULARY, Take 10 mg by mouth Daily. DHEA, Disp: , Rfl:   •  sildenafil (REVATIO) 20 MG tablet, Take 20 mg by mouth 3 (Three) Times a Day., Disp: , Rfl:   •  spironolactone (ALDACTONE) 25 MG tablet, Take 25 mg by mouth Daily., Disp: , Rfl:   •  tamsulosin (FLOMAX) 0.4 MG capsule 24 hr capsule, Take 1 capsule by mouth Every Night., Disp: , Rfl:   •  Testosterone Cypionate 200 MG/ML solution, Inject  as directed. .35ml weekly, Disp: , Rfl:   •  amLODIPine (NORVASC) 5 MG tablet, Take 5 mg by mouth Every Night., Disp: , Rfl:   •  Iron Combinations (NIFEREX) tablet, Take 150 mg by mouth Daily., Disp: 30 tablet, Rfl: 0    Past Medical History:   Diagnosis Date   • Arthritis    • Chronic neck pain    • Elevated PSA    • Enlarged prostate    • Hyperlipidemia    • Hypertension        Past Surgical History:   Procedure Laterality Date   • BACK SURGERY     • FEMUR IM NAILING/RODDING Left 5/21/2018    Procedure: FEMUR INTRAMEDULLARY NAILING/RODDING;  Surgeon: Hilario Owen MD;  Location: [facility];  Service: Orthopedics   • FRACTURE SURGERY     • PROSTATE BIOPSY         Social History     Socioeconomic History   • Marital status:      Spouse name: Not on file   • Number of children: Not on file   • Years of education: Not on file   • Highest education level: Not on file   Tobacco Use   • Smoking status: Never Smoker   • Smokeless tobacco: Never Used   Substance and Sexual Activity   • Alcohol use: Yes     Alcohol/week: 0.6 oz     Types: 1 Cans of beer per week     Comment: only occasionally   • Drug use: No   • Sexual activity: Yes     Partners: Female     Birth control/protection: None       Family History   Problem Relation Age of Onset   • Cancer Mother    • Heart disease Father    • Hyperlipidemia Father    •  "Hypertension Father        Objective    Temp 97.6 °F (36.4 °C)   Ht 182.9 cm (72\")   Wt 99.8 kg (220 lb)   BMI 29.84 kg/m²     Physical Exam  Constitutional: Well nourished, Well developed; No apparent distress.  His vital signs are reviewed  Psychiatric: Appropriate affect; Alert and oriented  Eyes: Unremarkable  Musculoskeletal: Normal gait and station  GI: Abdomen is soft, non-tender  Respiratory: No distress; Unlabored movement; No accessory musculature needed with symmetric movements  Skin: No pallor or diaphoresis  ; Penis and testicles are normal; Prostate 40 mL without nodule        Admission on 05/21/2018, Discharged on 05/25/2018   Component Date Value Ref Range Status   • Glucose 05/21/2018 143* 70 - 100 mg/dL Final   • BUN 05/21/2018 19  5 - 21 mg/dL Final   • Creatinine 05/21/2018 1.58* 0.50 - 1.40 mg/dL Final   • Sodium 05/21/2018 137  135 - 145 mmol/L Final   • Potassium 05/21/2018 3.4* 3.5 - 5.3 mmol/L Final   • Chloride 05/21/2018 100  98 - 110 mmol/L Final   • CO2 05/21/2018 23.0* 24.0 - 31.0 mmol/L Final   • Calcium 05/21/2018 9.1  8.4 - 10.4 mg/dL Final   • Total Protein 05/21/2018 7.0  6.3 - 8.7 g/dL Final   • Albumin 05/21/2018 4.10  3.50 - 5.00 g/dL Final   • ALT (SGPT) 05/21/2018 25  0 - 54 U/L Final   • AST (SGOT) 05/21/2018 39  7 - 45 U/L Final   • Alkaline Phosphatase 05/21/2018 76  24 - 120 U/L Final   • Total Bilirubin 05/21/2018 0.5  0.1 - 1.0 mg/dL Final   • eGFR Non African Amer 05/21/2018 44* >60 mL/min/1.73 Final   • Globulin 05/21/2018 2.9  gm/dL Final   • A/G Ratio 05/21/2018 1.4  1.1 - 2.5 g/dL Final   • BUN/Creatinine Ratio 05/21/2018 12.0  7.0 - 25.0 Final   • Anion Gap 05/21/2018 14.0* 4.0 - 13.0 mmol/L Final   • Protime 05/21/2018 13.6  11.9 - 14.6 Seconds Final   • INR 05/21/2018 1.01  0.91 - 1.09 Final   • Ethanol % 05/21/2018 <0.010  <0.010 % Final   • ABO Type 05/21/2018 A   Final   • RH type 05/21/2018 Positive   Final   • Antibody Screen 05/21/2018 Negative   Final "   • T&S Expiration Date 05/21/2018 5/24/2018 11:59:59 PM   Final   • Extra Tube 05/21/2018 hold for add-on   Final    Auto resulted   • Extra Tube 05/21/2018 Hold for add-ons.   Final    Auto resulted.   • Extra Tube 05/21/2018 hold for add-on   Final    Auto resulted   • Extra Tube 05/21/2018 Hold for add-ons.   Final    Auto resulted.   • WBC 05/21/2018 12.85* 4.80 - 10.80 10*3/mm3 Final   • RBC 05/21/2018 4.70* 4.80 - 5.90 10*6/mm3 Final   • Hemoglobin 05/21/2018 13.6* 14.0 - 18.0 g/dL Final   • Hematocrit 05/21/2018 39.9* 40.0 - 52.0 % Final   • MCV 05/21/2018 84.9  82.0 - 95.0 fL Final   • MCH 05/21/2018 28.9  28.0 - 32.0 pg Final   • MCHC 05/21/2018 34.1  33.0 - 36.0 g/dL Final   • RDW 05/21/2018 13.5  12.0 - 15.0 % Final   • RDW-SD 05/21/2018 41.8  40.0 - 54.0 fl Final   • MPV 05/21/2018 10.5  6.0 - 12.0 fL Final   • Platelets 05/21/2018 237  130 - 400 10*3/mm3 Final   • Neutrophil % 05/21/2018 75.8  39.0 - 78.0 % Final   • Lymphocyte % 05/21/2018 14.6* 15.0 - 45.0 % Final   • Monocyte % 05/21/2018 7.5  4.0 - 12.0 % Final   • Eosinophil % 05/21/2018 1.0  0.0 - 4.0 % Final   • Basophil % 05/21/2018 0.6  0.0 - 2.0 % Final   • Immature Grans % 05/21/2018 0.5  0.0 - 5.0 % Final   • Neutrophils, Absolute 05/21/2018 9.73* 1.87 - 8.40 10*3/mm3 Final   • Lymphocytes, Absolute 05/21/2018 1.88  0.72 - 4.86 10*3/mm3 Final   • Monocytes, Absolute 05/21/2018 0.97  0.19 - 1.30 10*3/mm3 Final   • Eosinophils, Absolute 05/21/2018 0.13  0.00 - 0.70 10*3/mm3 Final   • Basophils, Absolute 05/21/2018 0.08  0.00 - 0.20 10*3/mm3 Final   • Immature Grans, Absolute 05/21/2018 0.06* 0.00 - 0.03 10*3/mm3 Final   • nRBC 05/21/2018 0.0  0.0 - 0.0 /100 WBC Final   • Glucose 05/22/2018 141* 70 - 100 mg/dL Final   • BUN 05/22/2018 18  5 - 21 mg/dL Final   • Creatinine 05/22/2018 1.25  0.50 - 1.40 mg/dL Final   • Sodium 05/22/2018 136  135 - 145 mmol/L Final   • Potassium 05/22/2018 5.1  3.5 - 5.3 mmol/L Final   • Chloride 05/22/2018 103   98 - 110 mmol/L Final   • CO2 05/22/2018 25.0  24.0 - 31.0 mmol/L Final   • Calcium 05/22/2018 8.0* 8.4 - 10.4 mg/dL Final   • eGFR Non African Amer 05/22/2018 57* >60 mL/min/1.73 Final   • BUN/Creatinine Ratio 05/22/2018 14.4  7.0 - 25.0 Final   • Anion Gap 05/22/2018 8.0  4.0 - 13.0 mmol/L Final   • WBC 05/22/2018 8.17  4.80 - 10.80 10*3/mm3 Final   • RBC 05/22/2018 3.95* 4.80 - 5.90 10*6/mm3 Final   • Hemoglobin 05/22/2018 11.5* 14.0 - 18.0 g/dL Final   • Hematocrit 05/22/2018 33.5* 40.0 - 52.0 % Final   • MCV 05/22/2018 84.8  82.0 - 95.0 fL Final   • MCH 05/22/2018 29.1  28.0 - 32.0 pg Final   • MCHC 05/22/2018 34.3  33.0 - 36.0 g/dL Final   • RDW 05/22/2018 13.8  12.0 - 15.0 % Final   • RDW-SD 05/22/2018 42.6  40.0 - 54.0 fl Final   • MPV 05/22/2018 11.3  6.0 - 12.0 fL Final   • Platelets 05/22/2018 219  130 - 400 10*3/mm3 Final   • WBC 05/24/2018 7.65  4.80 - 10.80 10*3/mm3 Final   • RBC 05/24/2018 3.38* 4.80 - 5.90 10*6/mm3 Final   • Hemoglobin 05/24/2018 9.7* 14.0 - 18.0 g/dL Final   • Hematocrit 05/24/2018 29.4* 40.0 - 52.0 % Final   • MCV 05/24/2018 87.0  82.0 - 95.0 fL Final   • MCH 05/24/2018 28.7  28.0 - 32.0 pg Final   • MCHC 05/24/2018 33.0  33.0 - 36.0 g/dL Final   • RDW 05/24/2018 13.8  12.0 - 15.0 % Final   • RDW-SD 05/24/2018 43.8  40.0 - 54.0 fl Final   • MPV 05/24/2018 11.1  6.0 - 12.0 fL Final   • Platelets 05/24/2018 157  130 - 400 10*3/mm3 Final   • Glucose 05/24/2018 104* 70 - 100 mg/dL Final   • BUN 05/24/2018 15  5 - 21 mg/dL Final   • Creatinine 05/24/2018 1.22  0.50 - 1.40 mg/dL Final   • Sodium 05/24/2018 135  135 - 145 mmol/L Final   • Potassium 05/24/2018 4.2  3.5 - 5.3 mmol/L Final   • Chloride 05/24/2018 97* 98 - 110 mmol/L Final   • CO2 05/24/2018 33.0* 24.0 - 31.0 mmol/L Final   • Calcium 05/24/2018 8.6  8.4 - 10.4 mg/dL Final   • eGFR Non African Amer 05/24/2018 59* >60 mL/min/1.73 Final   • BUN/Creatinine Ratio 05/24/2018 12.3  7.0 - 25.0 Final   • Anion Gap 05/24/2018 5.0  4.0  - 13.0 mmol/L Final   • Hemoglobin 05/25/2018 9.3* 14.0 - 18.0 g/dL Final   • Hematocrit 05/25/2018 28.1* 40.0 - 52.0 % Final       Results for orders placed or performed in visit on 04/24/19   POC Urinalysis Dipstick, Multipro   Result Value Ref Range    Color Yellow Yellow, Straw, Dark Yellow, Teresa    Clarity, UA Clear Clear    Glucose, UA Negative Negative, 1000 mg/dL (3+) mg/dL    Bilirubin Negative Negative    Ketones, UA Negative Negative    Specific Gravity  1.020 1.005 - 1.030    Blood, UA Trace (A) Negative    pH, Urine 6.0 5.0 - 8.0    Protein, POC Negative Negative mg/dL    Urobilinogen, UA Normal Normal    Nitrite, UA Negative Negative    Leukocytes Small (1+) (A) Negative     Patient's Body mass index is 29.84 kg/m². BMI is above normal parameters. Recommendations include: educational material.    Assessment and Plan    Michele was seen today for elevated psa.    Diagnoses and all orders for this visit:    Elevated PSA  -     POC Urinalysis Dipstick, Multipro  -     MRI Pelvis With & Without Contrast; Future    Low testosterone    I reviewed his outside records.  In summary this is a 68-year-old gentleman who was seen by a urologist in [place] for low testosterone and he was getting injections 0.35 mL of the 200 mg/mL testosterone weekly.  He has had multiple PSA in the past which ranged usually from 2-3.  He had a PSA in January which was 5.5.  He has had a previous biopsy in 2017 and I reviewed the pathology on this showing benign tissue.  Per patient he had a repeat PSA by his primary care physician in [facility] and per report this is 4.1.  Unfortunately I do not have this with me today.    Patient with a history of elevated PSA on testosterone treatment.  History of negative previous prostate biopsy.  I think the next best step would be an MRI of the prostate if there are any abnormal lesions, an MRI fusion guided prostate biopsy.

## 2019-04-24 NOTE — PATIENT INSTRUCTIONS

## 2019-05-13 ENCOUNTER — HOSPITAL ENCOUNTER (OUTPATIENT)
Dept: MRI IMAGING | Facility: HOSPITAL | Age: 69
Discharge: HOME | End: 2019-05-13
Admitting: UROLOGY

## 2019-05-13 DIAGNOSIS — R97.20 ELEVATED PSA: ICD-10-CM

## 2019-05-13 LAB — CREAT BLDA-MCNC: 1.4 MG/DL (ref 0.6–1.3)

## 2019-05-13 PROCEDURE — 82565 ASSAY OF CREATININE: CPT

## 2019-05-13 PROCEDURE — A9577 INJ MULTIHANCE: HCPCS | Performed by: UROLOGY

## 2019-05-13 PROCEDURE — 0 GADOBENATE DIMEGLUMINE 529 MG/ML SOLUTION: Performed by: UROLOGY

## 2019-05-13 PROCEDURE — 72197 MRI PELVIS W/O & W/DYE: CPT

## 2019-05-13 RX ADMIN — GADOBENATE DIMEGLUMINE 20 ML: 529 INJECTION, SOLUTION INTRAVENOUS at 08:38

## 2019-05-20 ENCOUNTER — OFFICE VISIT (OUTPATIENT)
Dept: UROLOGY | Facility: CLINIC | Age: 69
End: 2019-05-20

## 2019-05-20 VITALS — BODY MASS INDEX: 29.8 KG/M2 | TEMPERATURE: 98 F | HEIGHT: 72 IN | WEIGHT: 220 LBS

## 2019-05-20 DIAGNOSIS — R93.5 ABNORMAL MRI, PELVIS: ICD-10-CM

## 2019-05-20 DIAGNOSIS — R97.20 ELEVATED PSA: Primary | ICD-10-CM

## 2019-05-20 LAB
BILIRUB BLD-MCNC: NEGATIVE MG/DL
CLARITY, POC: CLEAR
COLOR UR: YELLOW
GLUCOSE UR STRIP-MCNC: NEGATIVE MG/DL
KETONES UR QL: NEGATIVE
LEUKOCYTE EST, POC: ABNORMAL
NITRITE UR-MCNC: NEGATIVE MG/ML
PH UR: 6 [PH] (ref 5–8)
PROT UR STRIP-MCNC: NEGATIVE MG/DL
RBC # UR STRIP: NEGATIVE /UL
SP GR UR: 1.02 (ref 1–1.03)
UROBILINOGEN UR QL: NORMAL

## 2019-05-20 PROCEDURE — 81003 URINALYSIS AUTO W/O SCOPE: CPT | Performed by: UROLOGY

## 2019-05-20 PROCEDURE — 99214 OFFICE O/P EST MOD 30 MIN: CPT | Performed by: UROLOGY

## 2019-05-20 RX ORDER — CIPROFLOXACIN 500 MG/1
500 TABLET, FILM COATED ORAL 2 TIMES DAILY
Qty: 6 TABLET | Refills: 3 | Status: SHIPPED | OUTPATIENT
Start: 2019-05-20 | End: 2019-06-03

## 2019-05-20 NOTE — PATIENT INSTRUCTIONS

## 2019-05-20 NOTE — PROGRESS NOTES
Mr. Harvey is 68 y.o. male    Chief Complaint   Patient presents with   • Elevated PSA       History of Present Illness  Elevated PSA  Patient is here with an elevated PSA. He has no personal history of prostate cancer. His AUA Symptom Score is 6/35, manifested as obstructive symptoms including intermittency. He has a prior genitourinary history of prostate biopsy.  Previous PSA values are :   4.1   He reports intermittency. He denies frequency and urgency. Patient states symptoms are of mild severity. Onset of symptoms was several years ago and was gradual in onset.    The following portions of the patient's history were reviewed and updated as appropriate: allergies, current medications, past family history, past medical history, past social history, past surgical history and problem list.    Review of Systems   Constitutional: Negative for chills and fever.   Gastrointestinal: Negative for abdominal pain, anal bleeding and blood in stool.   Genitourinary: Negative for decreased urine volume, difficulty urinating, discharge, dysuria, enuresis, flank pain, frequency, genital sores, hematuria, penile pain, penile swelling, scrotal swelling, testicular pain and urgency.       I have reviewed the review of systems      Current Outpatient Medications:   •  amLODIPine (NORVASC) 5 MG tablet, Take 5 mg by mouth Every Night., Disp: , Rfl:   •  atorvastatin (LIPITOR) 40 MG tablet, Take 40 mg by mouth Daily., Disp: , Rfl:   •  BIOTIN 5000 PO, Take  by mouth., Disp: , Rfl:   •  Iron Combinations (NIFEREX) tablet, Take 150 mg by mouth Daily., Disp: 30 tablet, Rfl: 0  •  lisinopril (PRINIVIL,ZESTRIL) 20 MG tablet, Take 20 mg by mouth Daily., Disp: , Rfl:   •  LUTEIN PO, Take  by mouth., Disp: , Rfl:   •  Misc Natural Products (PROSTATE HEALTH PO), Take 1 tablet by mouth Daily., Disp: , Rfl:   •  NON FORMULARY, Take 10 mg by mouth Daily. DHEA, Disp: , Rfl:   •  sildenafil (REVATIO) 20 MG tablet, Take 20 mg by mouth 3 (Three)  "Times a Day., Disp: , Rfl:   •  spironolactone (ALDACTONE) 25 MG tablet, Take 25 mg by mouth Daily., Disp: , Rfl:   •  tamsulosin (FLOMAX) 0.4 MG capsule 24 hr capsule, Take 1 capsule by mouth Every Night., Disp: , Rfl:   •  Testosterone Cypionate 200 MG/ML solution, Inject  as directed. .35ml weekly, Disp: , Rfl:   •  ciprofloxacin (CIPRO) 500 MG tablet, Take 1 tablet by mouth 2 (Two) Times a Day. Start the day prior to biopsy, Disp: 6 tablet, Rfl: 3    Past Medical History:   Diagnosis Date   • Arthritis    • Chronic neck pain    • Elevated PSA    • Enlarged prostate    • Hyperlipidemia    • Hypertension        Past Surgical History:   Procedure Laterality Date   • BACK SURGERY     • FEMUR IM NAILING/RODDING Left 5/21/2018    Procedure: FEMUR INTRAMEDULLARY NAILING/RODDING;  Surgeon: Hilario Owen MD;  Location: [facility];  Service: Orthopedics   • FRACTURE SURGERY     • PROSTATE BIOPSY         Social History     Socioeconomic History   • Marital status:      Spouse name: Not on file   • Number of children: Not on file   • Years of education: Not on file   • Highest education level: Not on file   Tobacco Use   • Smoking status: Never Smoker   • Smokeless tobacco: Never Used   Substance and Sexual Activity   • Alcohol use: Yes     Alcohol/week: 0.6 oz     Types: 1 Cans of beer per week     Comment: only occasionally   • Drug use: No   • Sexual activity: Yes     Partners: Female     Birth control/protection: None       Family History   Problem Relation Age of Onset   • Cancer Mother    • Heart disease Father    • Hyperlipidemia Father    • Hypertension Father        Objective    Temp 98 °F (36.7 °C)   Ht 182.9 cm (72\")   Wt 99.8 kg (220 lb)   BMI 29.84 kg/m²     Physical Exam   Constitutional: He is oriented to person, place, and time. He appears well-developed and well-nourished. No distress.   Pulmonary/Chest: Effort normal.   Abdominal: Soft. He exhibits no distension and no mass. There is no " tenderness. There is no rebound and no guarding. No hernia.   Neurological: He is alert and oriented to person, place, and time.   Skin: Skin is warm and dry. He is not diaphoretic.   Psychiatric: He has a normal mood and affect.   Vitals reviewed.      Hospital Outpatient Visit on 05/13/2019   Component Date Value Ref Range Status   • Creatinine 05/13/2019 1.40* 0.60 - 1.30 mg/dL Final    Serial Number: [id number]:  [id number]       Results for orders placed or performed in visit on 05/20/19   POC Urinalysis Dipstick, Multipro   Result Value Ref Range    Color Yellow Yellow, Straw, Dark Yellow, Teresa    Clarity, UA Clear Clear    Glucose, UA Negative Negative, 1000 mg/dL (3+) mg/dL    Bilirubin Negative Negative    Ketones, UA Negative Negative    Specific Gravity  1.020 1.005 - 1.030    Blood, UA Negative Negative    pH, Urine 6.0 5.0 - 8.0    Protein, POC Negative Negative mg/dL    Urobilinogen, UA Normal Normal    Nitrite, UA Negative Negative    Leukocytes Small (1+) (A) Negative     Patient's Body mass index is 29.84 kg/m². BMI is above normal parameters. Recommendations include: educational material.    Assessment and Plan    Michele was seen today for elevated psa.    Diagnoses and all orders for this visit:    Elevated PSA  -     POC Urinalysis Dipstick, Multipro  -     Case Request; Standing  -     cefTRIAXone (ROCEPHIN) 1 g in sodium chloride 0.9 % 100 mL IVPB  -     Case Request  -     ciprofloxacin (CIPRO) 500 MG tablet; Take 1 tablet by mouth 2 (Two) Times a Day. Start the day prior to biopsy    Abnormal MRI, pelvis    Other orders  -     Follow Anesthesia Guidelines / Standing Orders; Future  -     Obtain informed consent  -     Provide NPO Instructions to Patient; Future  -     Follow Anesthesia Guidelines / Standing Orders; Standing  -     Verify NPO Status; Standing  -     Verify / Perform Chlorhexidine Skin Prep if Indicated (If Not Already Completed); Standing    Patient with an elevated PSA with  a previous negative biopsy.  He has a new finding of an abnormal MRI of the pelvis showing 2 lesions in the prostate measured at a PI-RADS 5.  I discussed with him that this is a very abnormal lesion and options are to continue to monitor or move forward with a prostate biopsy.  He is chosen to move forward with a prostate biopsy.  Risk and benefits as outlined below.     The risks (infection, bleeding, pain, retention, sepsis) and possible benefits of transrectal ultrasound with biopsy of the prostate gland is also discussed. It is explained that some patients require a repeat biopsy based on diagnosis of prostate intraepithelial neoplasia or even a continued rising PSA after an initial biopsy. He voiced no additional questions.

## 2019-06-03 ENCOUNTER — APPOINTMENT (OUTPATIENT)
Dept: PREADMISSION TESTING | Facility: HOSPITAL | Age: 69
End: 2019-06-03

## 2019-06-03 VITALS
DIASTOLIC BLOOD PRESSURE: 62 MMHG | HEART RATE: 55 BPM | WEIGHT: 213.85 LBS | HEIGHT: 72 IN | BODY MASS INDEX: 28.96 KG/M2 | OXYGEN SATURATION: 99 % | SYSTOLIC BLOOD PRESSURE: 134 MMHG

## 2019-06-03 LAB
ANION GAP SERPL CALCULATED.3IONS-SCNC: 6 MMOL/L (ref 4–13)
BUN BLD-MCNC: 26 MG/DL (ref 5–21)
BUN/CREAT SERPL: 20.2 (ref 7–25)
CALCIUM SPEC-SCNC: 9.5 MG/DL (ref 8.4–10.4)
CHLORIDE SERPL-SCNC: 103 MMOL/L (ref 98–110)
CO2 SERPL-SCNC: 27 MMOL/L (ref 24–31)
CREAT BLD-MCNC: 1.29 MG/DL (ref 0.5–1.4)
DEPRECATED RDW RBC AUTO: 40.6 FL (ref 40–54)
ERYTHROCYTE [DISTWIDTH] IN BLOOD BY AUTOMATED COUNT: 13.1 % (ref 12–15)
GFR SERPL CREATININE-BSD FRML MDRD: 55 ML/MIN/1.73
GLUCOSE BLD-MCNC: 99 MG/DL (ref 70–100)
HCT VFR BLD AUTO: 40.7 % (ref 40–52)
HGB BLD-MCNC: 14.1 G/DL (ref 14–18)
MCH RBC QN AUTO: 29.3 PG (ref 28–32)
MCHC RBC AUTO-ENTMCNC: 34.6 G/DL (ref 33–36)
MCV RBC AUTO: 84.4 FL (ref 82–95)
PLATELET # BLD AUTO: 185 10*3/MM3 (ref 130–400)
PMV BLD AUTO: 10.4 FL (ref 6–12)
POTASSIUM BLD-SCNC: 5.2 MMOL/L (ref 3.5–5.3)
RBC # BLD AUTO: 4.82 10*6/MM3 (ref 4.8–5.9)
SODIUM BLD-SCNC: 136 MMOL/L (ref 135–145)
WBC NRBC COR # BLD: 5.46 10*3/MM3 (ref 4.8–10.8)

## 2019-06-03 PROCEDURE — 80048 BASIC METABOLIC PNL TOTAL CA: CPT | Performed by: UROLOGY

## 2019-06-03 PROCEDURE — 93010 ELECTROCARDIOGRAM REPORT: CPT | Performed by: INTERNAL MEDICINE

## 2019-06-03 PROCEDURE — 93005 ELECTROCARDIOGRAM TRACING: CPT

## 2019-06-03 PROCEDURE — 85027 COMPLETE CBC AUTOMATED: CPT | Performed by: UROLOGY

## 2019-06-03 PROCEDURE — 36415 COLL VENOUS BLD VENIPUNCTURE: CPT

## 2019-06-03 NOTE — DISCHARGE INSTRUCTIONS
DAY OF SURGERY INSTRUCTIONS        YOUR SURGEON: ***    PROCEDURE: ***MRI prostate ultrasound biopsy with Uronav    DATE OF SURGERY: ***6/10/19    ARRIVAL TIME: AS DIRECTED BY OFFICE    YOU MAY TAKE THE FOLLOWING MEDICATION(S) THE MORNING OF SURGERY WITH A SIP OF WATER: ***as directed by your surgeon   (do not take lisinopril 24 hours prior to surgery per anesthesia)    ALL OTHER HOME MEDICATIONS CHECK WITH YOUR DOCTOR              MANAGING PAIN AFTER SURGERY    We know you are probably wondering what your pain will be like after surgery.  Following surgery it is unrealistic to expect you will not have pain.   Pain is how our bodies let us know that something is wrong or cautions us to be careful.  That said, our goal is to make your pain tolerable.    Methods we may use to treat your pain include (oral or IV medications, PCAs, epidurals, nerve blocks, etc.)   While some procedures require IV pain medications for a short time after surgery, transitioning to pain medications by mouth allows for better management of pain.   Your nurse will encourage you to take oral pain medications whenever possible.  IV medications work almost immediately, but only last a short while.  Taking medications by mouth allows for a more constant level of medication in your blood stream for a longer period of time.      Once your pain is out of control it is harder to get back under control.  It is important you are aware when your next dose of pain medication is due.  If you are admitted, your nurse may write the time of your next dose on the white board in your room to help you remember.      We are interested in your pain and encourage you to inform us about aggravating factors during your visit.   Many times a simple repositioning every few hours can make a big difference.    If your physician says it is okay, do not let your pain prevent you from getting out of bed. Be sure to call your nurse for assistance prior to getting up  so you do not fall.      Before surgery, please decide your tolerable pain goal.  These faces help describe the pain ratings we use on a 0-10 scale.   Be prepared to tell us your goal and whether or not you take pain or anxiety medications at home.            BEFORE YOU COME TO THE HOSPITAL  (Pre-op instructions)  • Do not eat, drink, smoke or chew gum after midnight the night before surgery.  This also includes no mints.  • Morning of surgery take only the medicines you have been instructed with a sip of water unless otherwise instructed  by your physician.  • Do not shave, wear makeup or dark nail polish.  • Remove all jewelry including rings.  • Leave anything you consider valuable at home.  • Leave your suitcase in the car until after your surgery.  • Bring the following with you if applicable:  o Picture ID and insurance, Medicare or Medicaid cards  o Co-pay/deductible required by insurance (cash, check, credit card)  o Copy of advance directive, living will or power-of- documents if not brought to PAT  o CPAP or BIPAP mask and tubing  o Relaxation aids (MP3 player, book, magazine)  • On the day of surgery check in at registration located at the main entrance of the hospital.       Outpatient Surgery Guidelines, Adult  Outpatient procedures are those for which the person having the procedure is allowed to go home the same day as the procedure. Various procedures are done on an outpatient basis. You should follow some general guidelines if you will be having an outpatient procedure.  LET YOUR HEALTH CARE PROVIDER KNOW ABOUT:  · Any allergies you have.  · All medicines you are taking, including vitamins, herbs, eye drops, creams, and over-the-counter medicines.  · Previous problems you or members of your family have had with the use of anesthetics.  · Any blood disorders you have.  · Previous surgeries you have had.  · Medical conditions you have.  RISKS AND COMPLICATIONS  Your health care provider will  discuss possible risks and complications with you before surgery. Common risks and complications include:    · Problems due to the use of anesthetics.  · Blood loss and replacement (does not apply to minor surgical procedures).  · Temporary increase in pain due to surgery.  · Uncorrected pain or problems that the surgery was meant to correct.  · Infection.  · New damage.  BEFORE THE PROCEDURE  · Ask your health care provider about changing or stopping your regular medicines. You may need to stop taking certain medicines in the days or weeks before the procedure.  · Stop smoking at least 2 weeks before surgery. This lowers your risk for complications during and after surgery. Ask your health care provider for help with this if needed.  · Eat your usual meals and a light supper the day before surgery. Continue fluid intake. Do not drink alcohol.  · Do not eat or drink after midnight the night before your surgery.   · Arrange for someone to take you home and to stay with you for 24 hours after the procedure. Medicine given for your procedure may affect your ability to drive or to care for yourself.  · Call your health care provider's office if you develop an illness or problem that may prevent you from safely having your procedure.  AFTER THE PROCEDURE  After surgery, you will be taken to a recovery area, where your progress will be monitored. If there are no complications, you will be allowed to go home when you are awake, stable, and taking fluids well. You may have numbness around the surgical site. Healing will take some time. You will have tenderness at the surgical site and may have some swelling and bruising. You may also have some nausea.  HOME CARE INSTRUCTIONS  · Do not drive for 24 hours, or as directed by your health care provider. Do not drive while taking prescription pain medicines.  · Do not drink alcohol for 24 hours.  · Do not make important decisions or sign legal documents for 24 hours.  · You may  resume a normal diet and activities as directed.  · Do not lift anything heavier than 10 pounds (4.5 kg) or play contact sports until your health care provider says it is okay.  · Change your bandages (dressings) as directed.  · Only take over-the-counter or prescription medicines as directed by your health care provider.  · Follow up with your health care provider as directed.  SEEK MEDICAL CARE IF:  · You have increased bleeding (more than a small spot) from the surgical site.  · You have redness, swelling, or increasing pain in the wound.  · You see pus coming from the wound.  · You have a fever.  · You notice a bad smell coming from the wound or dressing.  · You feel lightheaded or faint.  · You develop a rash.  · You have trouble breathing.  · You develop allergies.  MAKE SURE YOU:  · Understand these instructions.  · Will watch your condition.  · Will get help right away if you are not doing well or get worse.     This information is not intended to replace advice given to you by your health care provider. Make sure you discuss any questions you have with your health care provider.     Document Released: 09/12/2002 Document Revised: 05/03/2016 Document Reviewed: 05/22/2014  [employer] Interactive Patient Education ©2016 [employer]       Fall Prevention in Hospitals, Adult  As a hospital patient, your condition and the treatments you receive can increase your risk for falls. Some additional risk factors for falls in a hospital include:  · Being in an unfamiliar environment.  · Being on bed rest.  · Your surgery.  · Taking certain medicines.  · Your tubing requirements, such as intravenous (IV) therapy or catheters.  It is important that you learn how to decrease fall risks while at the hospital. Below are important tips that can help prevent falls.  SAFETY TIPS FOR PREVENTING FALLS  Talk about your risk of falling.  · Ask your health care provider why you are at risk for falling. Is it your medicine, illness,  tubing placement, or something else?  · Make a plan with your health care provider to keep you safe from falls.  · Ask your health care provider or pharmacist about side effects of your medicines. Some medicines can make you dizzy or affect your coordination.  Ask for help.  · Ask for help before getting out of bed. You may need to press your call button.  · Ask for assistance in getting safely to the toilet.  · Ask for a walker or cane to be put at your bedside. Ask that most of the side rails on your bed be placed up before your health care provider leaves the room.  · Ask family or friends to sit with you.  · Ask for things that are out of your reach, such as your glasses, hearing aids, telephone, bedside table, or call button.  Follow these tips to avoid falling:  · Stay lying or seated, rather than standing, while waiting for help.  · Wear rubber-soled slippers or shoes whenever you walk in the hospital.  · Avoid quick, sudden movements.  ¨ Change positions slowly.  ¨ Sit on the side of your bed before standing.  ¨ Stand up slowly and wait before you start to walk.  · Let your health care provider know if there is a spill on the floor.  · Pay careful attention to the medical equipment, electrical cords, and tubes around you.  · When you need help, use your call button by your bed or in the bathroom. Wait for one of your health care providers to help you.  · If you feel dizzy or unsure of your footing, return to bed and wait for assistance.  · Avoid being distracted by the TV, telephone, or another person in your room.  · Do not lean or support yourself on rolling objects, such as IV poles or bedside tables.     This information is not intended to replace advice given to you by your health care provider. Make sure you discuss any questions you have with your health care provider.     Document Released: 12/15/2001 Document Revised: 01/08/2016 Document Reviewed: 08/25/2013  [employer] Interactive Patient Education  ©2016 [employer]       Surgical Site Infections FAQs  What is a Surgical Site Infection (SSI)?  A surgical site infection is an infection that occurs after surgery in the part of the body where the surgery took place. Most patients who have surgery do not develop an infection. However, infections develop in about 1 to 3 out of every 100 patients who have surgery.  Some of the common symptoms of a surgical site infection are:  · Redness and pain around the area where you had surgery  · Drainage of cloudy fluid from your surgical wound  · Fever  Can SSIs be treated?  Yes. Most surgical site infections can be treated with antibiotics. The antibiotic given to you depends on the bacteria (germs) causing the infection. Sometimes patients with SSIs also need another surgery to treat the infection.  What are some of the things that hospitals are doing to prevent SSIs?  To prevent SSIs, doctors, nurses, and other healthcare providers:  · Clean their hands and arms up to their elbows with an antiseptic agent just before the surgery.  · Clean their hands with soap and water or an alcohol-based hand rub before and after caring for each patient.  · May remove some of your hair immediately before your surgery using electric clippers if the hair is in the same area where the procedure will occur. They should not shave you with a razor.  · Wear special hair covers, masks, gowns, and gloves during surgery to keep the surgery area clean.  · Give you antibiotics before your surgery starts. In most cases, you should get antibiotics within 60 minutes before the surgery starts and the antibiotics should be stopped within 24 hours after surgery.  · Clean the skin at the site of your surgery with a special soap that kills germs.  What can I do to help prevent SSIs?  Before your surgery:  · Tell your doctor about other medical problems you may have. Health problems such as allergies, diabetes, and obesity could affect your surgery and  your treatment.  · Quit smoking. Patients who smoke get more infections. Talk to your doctor about how you can quit before your surgery.  · Do not shave near where you will have surgery. Shaving with a razor can irritate your skin and make it easier to develop an infection.  At the time of your surgery:  · Speak up if someone tries to shave you with a razor before surgery. Ask why you need to be shaved and talk with your surgeon if you have any concerns.  · Ask if you will get antibiotics before surgery.  After your surgery:  · Make sure that your healthcare providers clean their hands before examining you, either with soap and water or an alcohol-based hand rub.  · If you do not see your providers clean their hands, please ask them to do so.  · Family and friends who visit you should not touch the surgical wound or dressings.  · Family and friends should clean their hands with soap and water or an alcohol-based hand rub before and after visiting you. If you do not see them clean their hands, ask them to clean their hands.  What do I need to do when I go home from the hospital?  · Before you go home, your doctor or nurse should explain everything you need to know about taking care of your wound. Make sure you understand how to care for your wound before you leave the hospital.  · Always clean your hands before and after caring for your wound.  · Before you go home, make sure you know who to contact if you have questions or problems after you get home.  · If you have any symptoms of an infection, such as redness and pain at the surgery site, drainage, or fever, call your doctor immediately.  If you have additional questions, please ask your doctor or nurse.  Developed and co-sponsored by The Society for Healthcare Epidemiology of [place] (SHEA); Infectious Diseases Society of [place] (IDSA); American Hospital Association; Association for Professionals in Infection Control and Epidemiology (APIC); Centers for Disease  Control and Prevention (CDC); and The Joint Commission.     This information is not intended to replace advice given to you by your health care provider. Make sure you discuss any questions you have with your health care provider.     Document Released: 12/23/2014 Document Revised: 01/08/2016 Document Reviewed: 03/02/2016  [employer] Interactive Patient Education ©2016 [employer]     PATIENT/FAMILY/RESPONSIBLE PARTY VERBALIZES UNDERSTANDING OF ABOVE EDUCATION.  COPY OF PAIN SCALE GIVEN AND REVIEWED WITH VERBALIZED UNDERSTANDING.

## 2019-06-10 ENCOUNTER — ANESTHESIA EVENT (OUTPATIENT)
Dept: PERIOP | Facility: HOSPITAL | Age: 69
End: 2019-06-10

## 2019-06-10 ENCOUNTER — HOSPITAL ENCOUNTER (OUTPATIENT)
Facility: HOSPITAL | Age: 69
Setting detail: HOSPITAL OUTPATIENT SURGERY
Discharge: HOME | End: 2019-06-10
Attending: UROLOGY | Admitting: UROLOGY

## 2019-06-10 ENCOUNTER — ANESTHESIA (OUTPATIENT)
Dept: PERIOP | Facility: HOSPITAL | Age: 69
End: 2019-06-10

## 2019-06-10 VITALS
RESPIRATION RATE: 18 BRPM | SYSTOLIC BLOOD PRESSURE: 138 MMHG | HEART RATE: 67 BPM | TEMPERATURE: 97.4 F | DIASTOLIC BLOOD PRESSURE: 61 MMHG | OXYGEN SATURATION: 97 %

## 2019-06-10 DIAGNOSIS — R93.5 ABNORMAL MRI, PELVIS: Primary | ICD-10-CM

## 2019-06-10 DIAGNOSIS — R97.20 ELEVATED PSA: ICD-10-CM

## 2019-06-10 PROCEDURE — 25010000002 FENTANYL CITRATE (PF) 100 MCG/2ML SOLUTION: Performed by: NURSE ANESTHETIST, CERTIFIED REGISTERED

## 2019-06-10 PROCEDURE — 76942 ECHO GUIDE FOR BIOPSY: CPT | Performed by: UROLOGY

## 2019-06-10 PROCEDURE — 25010000002 PROPOFOL 10 MG/ML EMULSION: Performed by: NURSE ANESTHETIST, CERTIFIED REGISTERED

## 2019-06-10 PROCEDURE — G0416 PROSTATE BIOPSY, ANY MTHD: HCPCS | Performed by: UROLOGY

## 2019-06-10 PROCEDURE — 25010000002 ONDANSETRON PER 1 MG: Performed by: NURSE ANESTHETIST, CERTIFIED REGISTERED

## 2019-06-10 PROCEDURE — 55700 PR PROSTATE NEEDLE BIOPSY ANY APPROACH: CPT | Performed by: UROLOGY

## 2019-06-10 PROCEDURE — 25010000002 DEXAMETHASONE PER 1 MG: Performed by: ANESTHESIOLOGY

## 2019-06-10 PROCEDURE — 25010000002 CEFTRIAXONE PER 250 MG: Performed by: UROLOGY

## 2019-06-10 PROCEDURE — 25010000002 DEXAMETHASONE PER 1 MG: Performed by: NURSE ANESTHETIST, CERTIFIED REGISTERED

## 2019-06-10 RX ORDER — METOCLOPRAMIDE HYDROCHLORIDE 5 MG/ML
5 INJECTION INTRAMUSCULAR; INTRAVENOUS
Status: DISCONTINUED | OUTPATIENT
Start: 2019-06-10 | End: 2019-06-10 | Stop reason: HOSPADM

## 2019-06-10 RX ORDER — SODIUM CHLORIDE 0.9 % (FLUSH) 0.9 %
1-10 SYRINGE (ML) INJECTION AS NEEDED
Status: DISCONTINUED | OUTPATIENT
Start: 2019-06-10 | End: 2019-06-10 | Stop reason: HOSPADM

## 2019-06-10 RX ORDER — SODIUM CHLORIDE, SODIUM LACTATE, POTASSIUM CHLORIDE, CALCIUM CHLORIDE 600; 310; 30; 20 MG/100ML; MG/100ML; MG/100ML; MG/100ML
1000 INJECTION, SOLUTION INTRAVENOUS CONTINUOUS
Status: DISCONTINUED | OUTPATIENT
Start: 2019-06-10 | End: 2019-06-10 | Stop reason: HOSPADM

## 2019-06-10 RX ORDER — LIDOCAINE HYDROCHLORIDE 20 MG/ML
INJECTION, SOLUTION INFILTRATION; PERINEURAL AS NEEDED
Status: DISCONTINUED | OUTPATIENT
Start: 2019-06-10 | End: 2019-06-10 | Stop reason: SURG

## 2019-06-10 RX ORDER — SODIUM CHLORIDE 0.9 % (FLUSH) 0.9 %
3 SYRINGE (ML) INJECTION AS NEEDED
Status: DISCONTINUED | OUTPATIENT
Start: 2019-06-10 | End: 2019-06-10 | Stop reason: HOSPADM

## 2019-06-10 RX ORDER — NALOXONE HCL 0.4 MG/ML
0.4 VIAL (ML) INJECTION AS NEEDED
Status: DISCONTINUED | OUTPATIENT
Start: 2019-06-10 | End: 2019-06-10 | Stop reason: HOSPADM

## 2019-06-10 RX ORDER — OXYCODONE HYDROCHLORIDE AND ACETAMINOPHEN 5; 325 MG/1; MG/1
1 TABLET ORAL ONCE AS NEEDED
Status: DISCONTINUED | OUTPATIENT
Start: 2019-06-10 | End: 2019-06-10 | Stop reason: HOSPADM

## 2019-06-10 RX ORDER — SODIUM CHLORIDE, SODIUM LACTATE, POTASSIUM CHLORIDE, CALCIUM CHLORIDE 600; 310; 30; 20 MG/100ML; MG/100ML; MG/100ML; MG/100ML
100 INJECTION, SOLUTION INTRAVENOUS CONTINUOUS
Status: DISCONTINUED | OUTPATIENT
Start: 2019-06-10 | End: 2019-06-10 | Stop reason: HOSPADM

## 2019-06-10 RX ORDER — LABETALOL HYDROCHLORIDE 5 MG/ML
5 INJECTION, SOLUTION INTRAVENOUS
Status: DISCONTINUED | OUTPATIENT
Start: 2019-06-10 | End: 2019-06-10 | Stop reason: HOSPADM

## 2019-06-10 RX ORDER — FENTANYL CITRATE 50 UG/ML
25 INJECTION, SOLUTION INTRAMUSCULAR; INTRAVENOUS AS NEEDED
Status: DISCONTINUED | OUTPATIENT
Start: 2019-06-10 | End: 2019-06-10 | Stop reason: HOSPADM

## 2019-06-10 RX ORDER — PROPOFOL 10 MG/ML
VIAL (ML) INTRAVENOUS AS NEEDED
Status: DISCONTINUED | OUTPATIENT
Start: 2019-06-10 | End: 2019-06-10 | Stop reason: SURG

## 2019-06-10 RX ORDER — HYDROCODONE BITARTRATE AND ACETAMINOPHEN 5; 325 MG/1; MG/1
1 TABLET ORAL EVERY 8 HOURS PRN
Qty: 9 TABLET | Refills: 0 | Status: SHIPPED | OUTPATIENT
Start: 2019-06-10 | End: 2021-06-14

## 2019-06-10 RX ORDER — DEXAMETHASONE SODIUM PHOSPHATE 4 MG/ML
4 INJECTION, SOLUTION INTRA-ARTICULAR; INTRALESIONAL; INTRAMUSCULAR; INTRAVENOUS; SOFT TISSUE ONCE AS NEEDED
Status: COMPLETED | OUTPATIENT
Start: 2019-06-10 | End: 2019-06-10

## 2019-06-10 RX ORDER — IPRATROPIUM BROMIDE AND ALBUTEROL SULFATE 2.5; .5 MG/3ML; MG/3ML
3 SOLUTION RESPIRATORY (INHALATION) ONCE AS NEEDED
Status: DISCONTINUED | OUTPATIENT
Start: 2019-06-10 | End: 2019-06-10 | Stop reason: HOSPADM

## 2019-06-10 RX ORDER — FENTANYL CITRATE 50 UG/ML
INJECTION, SOLUTION INTRAMUSCULAR; INTRAVENOUS AS NEEDED
Status: DISCONTINUED | OUTPATIENT
Start: 2019-06-10 | End: 2019-06-10 | Stop reason: SURG

## 2019-06-10 RX ORDER — IBUPROFEN 600 MG/1
600 TABLET ORAL ONCE AS NEEDED
Status: DISCONTINUED | OUTPATIENT
Start: 2019-06-10 | End: 2019-06-10 | Stop reason: HOSPADM

## 2019-06-10 RX ORDER — ONDANSETRON 2 MG/ML
INJECTION INTRAMUSCULAR; INTRAVENOUS AS NEEDED
Status: DISCONTINUED | OUTPATIENT
Start: 2019-06-10 | End: 2019-06-10 | Stop reason: SURG

## 2019-06-10 RX ORDER — EPHEDRINE SULFATE 50 MG/ML
INJECTION INTRAVENOUS AS NEEDED
Status: DISCONTINUED | OUTPATIENT
Start: 2019-06-10 | End: 2019-06-10 | Stop reason: SURG

## 2019-06-10 RX ORDER — SODIUM CHLORIDE 0.9 % (FLUSH) 0.9 %
3 SYRINGE (ML) INJECTION EVERY 12 HOURS SCHEDULED
Status: DISCONTINUED | OUTPATIENT
Start: 2019-06-10 | End: 2019-06-10 | Stop reason: HOSPADM

## 2019-06-10 RX ORDER — DEXAMETHASONE SODIUM PHOSPHATE 4 MG/ML
INJECTION, SOLUTION INTRA-ARTICULAR; INTRALESIONAL; INTRAMUSCULAR; INTRAVENOUS; SOFT TISSUE AS NEEDED
Status: DISCONTINUED | OUTPATIENT
Start: 2019-06-10 | End: 2019-06-10 | Stop reason: SURG

## 2019-06-10 RX ORDER — ACETAMINOPHEN 500 MG
1000 TABLET ORAL ONCE
Status: COMPLETED | OUTPATIENT
Start: 2019-06-10 | End: 2019-06-10

## 2019-06-10 RX ORDER — OXYCODONE AND ACETAMINOPHEN 7.5; 325 MG/1; MG/1
1 TABLET ORAL EVERY 4 HOURS PRN
Status: DISCONTINUED | OUTPATIENT
Start: 2019-06-10 | End: 2019-06-10 | Stop reason: HOSPADM

## 2019-06-10 RX ORDER — ONDANSETRON 2 MG/ML
4 INJECTION INTRAMUSCULAR; INTRAVENOUS ONCE AS NEEDED
Status: DISCONTINUED | OUTPATIENT
Start: 2019-06-10 | End: 2019-06-10 | Stop reason: HOSPADM

## 2019-06-10 RX ADMIN — ONDANSETRON HYDROCHLORIDE 4 MG: 2 SOLUTION INTRAMUSCULAR; INTRAVENOUS at 08:20

## 2019-06-10 RX ADMIN — SODIUM CHLORIDE, POTASSIUM CHLORIDE, SODIUM LACTATE AND CALCIUM CHLORIDE 1000 ML: 600; 310; 30; 20 INJECTION, SOLUTION INTRAVENOUS at 06:51

## 2019-06-10 RX ADMIN — EPHEDRINE SULFATE 25 MG: 50 INJECTION INTRAVENOUS at 08:23

## 2019-06-10 RX ADMIN — EPHEDRINE SULFATE 25 MG: 50 INJECTION INTRAVENOUS at 08:18

## 2019-06-10 RX ADMIN — DEXAMETHASONE SODIUM PHOSPHATE 4 MG: 4 INJECTION, SOLUTION INTRAMUSCULAR; INTRAVENOUS at 07:12

## 2019-06-10 RX ADMIN — SODIUM CHLORIDE 1 G: 900 INJECTION INTRAVENOUS at 07:21

## 2019-06-10 RX ADMIN — ACETAMINOPHEN 1000 MG: 500 TABLET, FILM COATED ORAL at 07:11

## 2019-06-10 RX ADMIN — LIDOCAINE HYDROCHLORIDE 100 MG: 20 INJECTION, SOLUTION INFILTRATION; PERINEURAL at 08:10

## 2019-06-10 RX ADMIN — PROPOFOL 200 MG: 10 INJECTION, EMULSION INTRAVENOUS at 08:10

## 2019-06-10 RX ADMIN — FENTANYL CITRATE 50 MCG: 50 INJECTION, SOLUTION INTRAMUSCULAR; INTRAVENOUS at 08:20

## 2019-06-10 RX ADMIN — DEXAMETHASONE SODIUM PHOSPHATE 8 MG: 4 INJECTION, SOLUTION INTRAMUSCULAR; INTRAVENOUS at 08:20

## 2019-06-10 RX ADMIN — FENTANYL CITRATE 50 MCG: 50 INJECTION, SOLUTION INTRAMUSCULAR; INTRAVENOUS at 08:08

## 2019-06-10 NOTE — OP NOTE
"Operative Summary    Michele Harvey  Date of Procedure: 6/10/2019    Pre-op Diagnosis:   Elevated PSA [R97.20]    Post-op Diagnosis:     Post-Op Diagnosis Codes:     * Elevated PSA [R97.20]    Procedure/CPT® Codes:      Procedure(s):  PROSTATE ULTRASOUND BIOPSY MRI FUSION WITH URONAV    Surgeon(s):  Shankar Wang MD    Anesthesia: Choice    Staff:   Circulator: Emilie Barraza RN; Vicky Ambrosio RN  Scrub Person: Nash Castellanos    Indications for procedure:  Elevated psa with previous negative biopsy    Findings:   Height (mm): 37.3  Width (mm): 52.5   Length (mm): 37.3  Volume (cc):  55.4  PSA density:  0.02        Procedure details:  Patient is taken the operating room were he is given general.  He is then placed in left lateral decubitus position.  Previous MRI images are reviewed as they have been loaded into the UroNav system. The electromagnetic generator for probe detection is attached to the bed and positioned appropriately. Using the B&K ultrasound, the transrectal probe is inserted to identify the mid portion of the prostate gland in the transverse image.  Measurements of the width and height of the gland are then obtained.  The multiplanar probe is then used to take sagittal images of the prostate and again measurement is taken of the length of the gland.  The prostate volume is calculated using height times with times length ×1/2 which is then compared to the volume of the MRI.    The pre-procedural MRI (along with the segmentation and targeting data as determined by the radiologist) selected for fusion biopsy is then overlaid \"fused\" to a the 3D TRUS volume of the prostate constructed from a series of 2D TRUS images obtained by a \"sweep\" of the TRUS probe.  Both rigid and elastic registration is carried out using the [employer] Safari[employer] software.    The region of interest is identified in the Rightmid and Right apical of the prostate.  The target is identified as drawn by the radiologist using the " "UroNav software that included the bulls eye with the  end-fire setting of the multiplanar probe. 5 biopsies are taken from the region of interest and labeled as \"region of interest #1 \"on the pathology requisition.  The midportion was biopsied with the other biopsies being a few millimeters away toward the periphery of the lesion by adjusting the probe.    The region of interest is identified in the Left apex of the prostate.  The target is identified as drawn by the radiologist using the UroNav software that included the bulls eye with the  end-fire setting of the multiplanar probe. 5 biopsies are taken from the region of interest and labeled as \"region of interest #2 \"on the pathology requisition.  The midportion was biopsied with the other biopsies being a few millimeters away toward the periphery of the lesion by adjusting the probe.    After biopsies of  the suspicious lesion(s) was completed, ultrasound-guided \"random \"biopsies were performed again using ultrasound guidance with the end-fire setting of the multiplanar probe.  I used a standard 12 core template and labeled the biopsy with respect to location.  Specimens were then placed in formalin.    The systematic of both the random biopsies and those of the region of interest(s) were reviewed and felt to be appropriate sampling of each.  The probe was removed.    Estimated Blood Loss: minimal    Specimens:                Specimens     ID Source Type Tests Collected By Collected At Frozen?      A Prostate Tissue · TISSUE PATHOLOGY EXAM   Shankar Wang MD 6/10/19 0705      Description: right mid lateral    B Prostate Tissue · TISSUE PATHOLOGY EXAM   Shankar Wang MD 6/10/19 0705      Description: right mid medial    C Prostate Tissue · TISSUE PATHOLOGY EXAM   Shankar Wang MD 6/10/19 0706      Description: right lateral base    D Prostate Tissue · TISSUE PATHOLOGY EXAM   Shankar Wang MD 6/10/19 0706      Description: right mid " base    E Prostate Tissue · TISSUE PATHOLOGY EXAM   Shankar Wang MD 6/10/19 0706      Description: right lateral apex    F Prostate Tissue · TISSUE PATHOLOGY EXAM   Shankar Wang MD 6/10/19 0706      Description: right mid apex    G Prostate Tissue · TISSUE PATHOLOGY EXAM   Shankar Wang MD 6/10/19 0706      Description: left mid lateral    H Prostate Tissue · TISSUE PATHOLOGY EXAM   Shankar Wang MD 6/10/19 0706      Description: left mid medial    I Prostate Tissue · TISSUE PATHOLOGY EXAM   Shankar Wang MD 6/10/19 0706      Description: left lateral base    J Prostate Tissue · TISSUE PATHOLOGY EXAM   Shankar Wang MD 6/10/19 0706      Description: left mid base    K Prostate Tissue · TISSUE PATHOLOGY EXAM   Shankar Wang MD 6/10/19 0706      Description: left lateral apex    L Prostate Tissue · TISSUE PATHOLOGY EXAM   Shankar Wang MD 6/10/19 0706      Description: left mid apex    M Prostate Tissue · TISSUE PATHOLOGY EXAM   Shankar Wang MD 6/10/19 0708      Description: lesion 1 target 1-5    N Prostate Tissue · TISSUE PATHOLOGY EXAM   Shankar Wang MD 6/10/19 0826      Description: lesion 2 target 1-5            Drains:      Complications: none    Plan: 2 week pathology review    Shankar Wang MD     Date: 6/10/2019  Time: 8:57 AM

## 2019-06-10 NOTE — ANESTHESIA PROCEDURE NOTES
Airway  Urgency: elective    Airway not difficult    General Information and Staff    Patient location during procedure: OR  CRNA: Sandra Caruso CRNA    Indications and Patient Condition  Indications for airway management: airway protection    Preoxygenated: yes  Mask difficulty assessment: 1 - vent by mask    Final Airway Details  Final airway type: supraglottic airway      Successful airway: I-gel  Size 4    Number of attempts at approach: 1    Additional Comments  Easy, atraumatic LMA placement

## 2019-06-10 NOTE — ANESTHESIA POSTPROCEDURE EVALUATION
Patient: Michele Harvey    Procedure Summary     Date:  06/10/19 Room / Location:   [facility] OR 09 /  [facility]    Anesthesia Start:  0806 Anesthesia Stop:  0840    Procedure:  PROSTATE ULTRASOUND BIOPSY MRI FUSION WITH URONAV (N/A ) Diagnosis:       Elevated PSA      (Elevated PSA [R97.20])    Surgeon:  Shankar Wang MD Provider:  Sandra Caruso CRNA    Anesthesia Type:  general ASA Status:  2          Anesthesia Type: general  Last vitals  BP   142/61 (06/10/19 0910)   Temp   97.4 °F (36.3 °C) (06/10/19 0900)   Pulse   71 (06/10/19 0910)   Resp   14 (06/10/19 0910)     SpO2   98 % (06/10/19 0910)     Post Anesthesia Care and Evaluation    Patient location during evaluation: PACU  Patient participation: complete - patient participated  Level of consciousness: awake and alert  Pain management: adequate  Airway patency: patent  Anesthetic complications: No anesthetic complications    Cardiovascular status: acceptable  Respiratory status: acceptable  Hydration status: acceptable    Comments: Blood pressure 142/61, pulse 71, temperature 97.4 °F (36.3 °C), temperature source Temporal, resp. rate 14, SpO2 98 %.    Pt discharged from PACU based on travis score >8

## 2019-06-10 NOTE — DISCHARGE INSTRUCTIONS

## 2019-06-10 NOTE — ANESTHESIA PREPROCEDURE EVALUATION
Anesthesia Evaluation     Patient summary reviewed   no history of anesthetic complications:  NPO Solid Status: > 8 hours  NPO Liquid Status: > 8 hours           Airway   Mallampati: I  TM distance: >3 FB  Neck ROM: full  Dental - normal exam     Pulmonary    (-) asthma, recent URI, sleep apnea, not a smoker  Cardiovascular   Exercise tolerance: good (4-7 METS)    ECG reviewed  Rhythm: regular  Rate: normal    (+) hypertension, hyperlipidemia,   (-) pacemaker, past MI, angina, cardiac stents, CABG      Neuro/Psych  (-) seizures, TIA, CVA  GI/Hepatic/Renal/Endo    (-) GERD, liver disease, no renal disease, diabetes, hypothyroidism, hyperthyroidism    Musculoskeletal     Abdominal    Substance History      OB/GYN          Other                        Anesthesia Plan    ASA 2     general     intravenous induction   Anesthetic plan, all risks, benefits, and alternatives have been provided, discussed and informed consent has been obtained with: patient.

## 2019-06-18 LAB
CYTO UR: NORMAL
LAB AP CASE REPORT: NORMAL
PATH REPORT.FINAL DX SPEC: NORMAL
PATH REPORT.GROSS SPEC: NORMAL

## 2019-09-16 ENCOUNTER — RESULTS ENCOUNTER (OUTPATIENT)
Dept: UROLOGY | Facility: CLINIC | Age: 69
End: 2019-09-16

## 2019-09-16 DIAGNOSIS — R97.20 ELEVATED PSA: Primary | ICD-10-CM

## 2019-09-16 DIAGNOSIS — R97.20 ELEVATED PSA: ICD-10-CM

## 2019-09-20 NOTE — PROGRESS NOTES
Subjective    Mr. Harvey is 69 y.o. male    Chief Complaint: Elevated PSA    History of Present Illness     Elevated PSA  Patient is here with an elevated PSA. The PSA was drawn1 week(s). He does not have a family history of prostate cancer. His AUA Symptom Score is 5 /35. Voiding symptoms include Intermittency, Weakened stream, Straining and Nocturia. Denies Dysuria and Hematuria. Voiding symptoms began several years  . These have been gradual in onset. Negative 2017, negative fusion biopsy 6/19 by Dr. Wang  Previous PSA values are :   No results found for: PSA         The following portions of the patient's history were reviewed and updated as appropriate: allergies, current medications, past family history, past medical history, past social history, past surgical history and problem list.    Review of Systems   Constitutional: Negative for chills and fever.   Gastrointestinal: Negative for abdominal pain, anal bleeding and blood in stool.   Genitourinary: Negative for dysuria, frequency, hematuria and urgency.         Current Outpatient Medications:   •  atorvastatin (LIPITOR) 40 MG tablet, Take 40 mg by mouth Daily., Disp: , Rfl:   •  BIOTIN 5000 PO, Take 1 tablet by mouth Daily., Disp: , Rfl:   •  Cholecalciferol (VITAMIN D PO), Take 3 drops by mouth Every Night., Disp: , Rfl:   •  HYDROcodone-acetaminophen (NORCO) 5-325 MG per tablet, Take 1 tablet by mouth Every 8 (Eight) Hours As Needed for Moderate Pain ., Disp: 9 tablet, Rfl: 0  •  lisinopril (PRINIVIL,ZESTRIL) 20 MG tablet, Take 20 mg by mouth Daily., Disp: , Rfl:   •  LUTEIN PO, Take 20 mg by mouth Every Other Day., Disp: , Rfl:   •  Misc Natural Products (PROSTATE HEALTH PO), Take 1 tablet by mouth Daily., Disp: , Rfl:   •  NON FORMULARY, Take 12.5 mg by mouth Daily. DHEA , Disp: , Rfl:   •  sildenafil (REVATIO) 20 MG tablet, Take 20 mg by mouth As Needed., Disp: , Rfl:   •  spironolactone (ALDACTONE) 25 MG tablet, Take 25 mg by mouth Daily., Disp: ,  "Rfl:   •  tamsulosin (FLOMAX) 0.4 MG capsule 24 hr capsule, Take 1 capsule by mouth Every Night., Disp: , Rfl:     Current Facility-Administered Medications:   •  cefTRIAXone (ROCEPHIN) 1 g/100 mL 0.9% NS (MBP), 1 g, Intravenous, 30 Min Pre-Op, Shankar Wang MD    Past Medical History:   Diagnosis Date   • Arthritis    • Cervical disc disease    • Chronic neck pain    • Elevated PSA    • Enlarged prostate    • Hyperlipidemia    • Hypertension        Past Surgical History:   Procedure Laterality Date   • BACK SURGERY     • FEMUR IM NAILING/RODDING Left 5/21/2018    Procedure: FEMUR INTRAMEDULLARY NAILING/RODDING;  Surgeon: Hilario Owen MD;  Location:  [facility];  Service: Orthopedics   • FRACTURE SURGERY      left thigh   • PROSTATE BIOPSY     • PROSTATE BIOPSY N/A 6/10/2019    Procedure: PROSTATE ULTRASOUND BIOPSY MRI FUSION WITH URONAV;  Surgeon: Shankar Wang MD;  Location:  [facility];  Service: Urology       Social History     Socioeconomic History   • Marital status:      Spouse name: Not on file   • Number of children: Not on file   • Years of education: Not on file   • Highest education level: Not on file   Tobacco Use   • Smoking status: Never Smoker   • Smokeless tobacco: Never Used   Substance and Sexual Activity   • Alcohol use: Yes     Alcohol/week: 0.6 oz     Types: 1 Cans of beer per week     Comment: only occasionally   • Drug use: No   • Sexual activity: Yes     Partners: Female     Birth control/protection: None       Family History   Problem Relation Age of Onset   • Cancer Mother    • Heart disease Father    • Hyperlipidemia Father    • Hypertension Father        Objective    Temp 96.8 °F (36 °C)   Ht 182.9 cm (72\")   Wt 98.4 kg (217 lb)   BMI 29.43 kg/m²     Physical Exam   Constitutional: He is oriented to person, place, and time. He appears well-developed and well-nourished.   Pulmonary/Chest: Effort normal.   Abdominal: Soft. He exhibits no distension and no mass. " There is no tenderness. There is no rebound and no guarding. No hernia.   Genitourinary: Penis normal. Rectal exam shows no mass, no tenderness and anal tone normal. Enlarged: for the age of the patient. Right testis shows no mass, no swelling and no tenderness. Left testis shows no mass, no swelling and no tenderness. No hypospadias. No discharge found.   Genitourinary Comments: Anus and perineum without mass or tenderness. The prostate is approximately 50- ml. It is Symmetric, with a Soft consistency. There are no nodules present. . The seminal vesicles are Not palpable due to the size of the prostate.     Neurological: He is alert and oriented to person, place, and time.   Vitals reviewed.          Results for orders placed or performed in visit on 09/25/19   POC Urinalysis Dipstick, Multipro   Result Value Ref Range    Color Yellow Yellow, Straw, Dark Yellow, Teresa    Clarity, UA Clear Clear    Glucose, UA Negative Negative, 1000 mg/dL (3+) mg/dL    Bilirubin Negative Negative    Ketones, UA Negative Negative    Specific Gravity  1.020 1.005 - 1.030    Blood, UA Negative Negative    pH, Urine 7.0 5.0 - 8.0    Protein, POC Negative Negative mg/dL    Urobilinogen, UA Normal Normal    Nitrite, UA Negative Negative    Leukocytes Negative Negative     Patient's Body mass index is 29.43 kg/m². BMI is above normal parameters. Recommendations include: educational material.    Assessment and Plan    Diagnoses and all orders for this visit:    Elevated PSA  -     POC Urinalysis Dipstick, Multipro  -     PSA, Total & Free; Future    BPH with urinary obstruction    Hypogonadism in male        Patient of Dr. Vasques.  He is undergone a previous negative prostate biopsy for an unknown PSA in 2017.  A.  His last PSA was 4.1.  He underwent a MRI fusion biopsy in June 2019.  His volume was found to be 55.4 cc.  He had evidence of 2 PI-RADS 5 lesions one in the right peripheral zone and one in the left peripheral zone.  His  biopsy was consistent with inflammation.      His PSA today is 2.2.    Follow up in six months with repeat PSA.

## 2019-09-25 ENCOUNTER — OFFICE VISIT (OUTPATIENT)
Dept: UROLOGY | Facility: CLINIC | Age: 69
End: 2019-09-25

## 2019-09-25 VITALS — TEMPERATURE: 96.8 F | HEIGHT: 72 IN | WEIGHT: 217 LBS | BODY MASS INDEX: 29.39 KG/M2

## 2019-09-25 DIAGNOSIS — N40.1 BPH WITH URINARY OBSTRUCTION: ICD-10-CM

## 2019-09-25 DIAGNOSIS — N13.8 BPH WITH URINARY OBSTRUCTION: ICD-10-CM

## 2019-09-25 DIAGNOSIS — E29.1 HYPOGONADISM IN MALE: ICD-10-CM

## 2019-09-25 DIAGNOSIS — R97.20 ELEVATED PSA: Primary | ICD-10-CM

## 2019-09-25 LAB
BILIRUB BLD-MCNC: NEGATIVE MG/DL
CLARITY, POC: CLEAR
COLOR UR: YELLOW
GLUCOSE UR STRIP-MCNC: NEGATIVE MG/DL
KETONES UR QL: NEGATIVE
LEUKOCYTE EST, POC: NEGATIVE
NITRITE UR-MCNC: NEGATIVE MG/ML
PH UR: 7 [PH] (ref 5–8)
PROT UR STRIP-MCNC: NEGATIVE MG/DL
RBC # UR STRIP: NEGATIVE /UL
SP GR UR: 1.02 (ref 1–1.03)
UROBILINOGEN UR QL: NORMAL

## 2019-09-25 PROCEDURE — 81001 URINALYSIS AUTO W/SCOPE: CPT | Performed by: UROLOGY

## 2019-09-25 PROCEDURE — 99213 OFFICE O/P EST LOW 20 MIN: CPT | Performed by: UROLOGY

## 2019-09-25 NOTE — PATIENT INSTRUCTIONS

## 2020-03-23 ENCOUNTER — RESULTS ENCOUNTER (OUTPATIENT)
Dept: UROLOGY | Facility: CLINIC | Age: 70
End: 2020-03-23

## 2020-03-23 DIAGNOSIS — R97.20 ELEVATED PSA: ICD-10-CM

## 2020-03-25 DIAGNOSIS — R97.20 ELEVATED PSA: Primary | ICD-10-CM

## 2020-04-27 NOTE — PROGRESS NOTES
Subjective    Mr. Harvey is 69 y.o. male    Chief Complaint: Elevated PSA    History of Present Illness  3/16/2020     PSA 1.9    Patient is here with an elevated PSA. The PSA was drawn1 week(s). He does not have a family history of prostate cancer. His AUA Symptom Score is 7 /35. Voiding symptoms include Intermittency, Weakened stream, Straining and Nocturia. Denies Dysuria and Hematuria. Voiding symptoms began several years  . These have been gradual in onset. Negative 2017, negative fusion biopsy 6/19 by Dr. Wang      The following portions of the patient's history were reviewed and updated as appropriate: allergies, current medications, past family history, past medical history, past social history, past surgical history and problem list.    Review of Systems   Constitutional: Negative for chills and fever.   Gastrointestinal: Negative for abdominal pain, anal bleeding and blood in stool.   Genitourinary: Positive for difficulty urinating. Negative for dysuria, flank pain, frequency, hematuria and urgency.         Current Outpatient Medications:   •  atorvastatin (LIPITOR) 40 MG tablet, Take 40 mg by mouth Daily., Disp: , Rfl:   •  BIOTIN 5000 PO, Take 1 tablet by mouth Daily., Disp: , Rfl:   •  lisinopril (PRINIVIL,ZESTRIL) 20 MG tablet, Take 20 mg by mouth Daily., Disp: , Rfl:   •  Misc Natural Products (PROSTATE HEALTH PO), Take 1 tablet by mouth Daily., Disp: , Rfl:   •  sildenafil (REVATIO) 20 MG tablet, Take 20 mg by mouth As Needed., Disp: , Rfl:   •  tamsulosin (FLOMAX) 0.4 MG capsule 24 hr capsule, Take 1 capsule by mouth Every Night., Disp: , Rfl:   •  Testosterone Cypionate 150 MG/ML solution, , Disp: , Rfl:   •  Cholecalciferol (VITAMIN D PO), Take 3 drops by mouth Every Night., Disp: , Rfl:   •  HYDROcodone-acetaminophen (NORCO) 5-325 MG per tablet, Take 1 tablet by mouth Every 8 (Eight) Hours As Needed for Moderate Pain ., Disp: 9 tablet, Rfl: 0  •  LUTEIN PO, Take 20 mg by mouth Every Other  "Day., Disp: , Rfl:   •  NON FORMULARY, Take 12.5 mg by mouth Daily. DHEA , Disp: , Rfl:   •  spironolactone (ALDACTONE) 25 MG tablet, Take 25 mg by mouth Daily., Disp: , Rfl:     Current Facility-Administered Medications:   •  cefTRIAXone (ROCEPHIN) 1 g/100 mL 0.9% NS (MBP), 1 g, Intravenous, 30 Min Pre-Op, Shankar Wang MD    Past Medical History:   Diagnosis Date   • Arthritis    • Cervical disc disease    • Chronic neck pain    • Elevated PSA    • Enlarged prostate    • Hyperlipidemia    • Hypertension        Past Surgical History:   Procedure Laterality Date   • BACK SURGERY     • FEMUR IM NAILING/RODDING Left 5/21/2018    Procedure: FEMUR INTRAMEDULLARY NAILING/RODDING;  Surgeon: Hilario Owen MD;  Location:  [facility];  Service: Orthopedics   • FRACTURE SURGERY      left thigh   • PROSTATE BIOPSY     • PROSTATE BIOPSY N/A 6/10/2019    Procedure: PROSTATE ULTRASOUND BIOPSY MRI FUSION WITH URONAV;  Surgeon: Shankar Wang MD;  Location:  [facility];  Service: Urology       Social History     Socioeconomic History   • Marital status:      Spouse name: Not on file   • Number of children: Not on file   • Years of education: Not on file   • Highest education level: Not on file   Tobacco Use   • Smoking status: Never Smoker   • Smokeless tobacco: Never Used   Substance and Sexual Activity   • Alcohol use: Yes     Alcohol/week: 1.0 standard drinks     Types: 1 Cans of beer per week     Comment: only occasionally   • Drug use: No   • Sexual activity: Yes     Partners: Female     Birth control/protection: None       Family History   Problem Relation Age of Onset   • Cancer Mother    • Heart disease Father    • Hyperlipidemia Father    • Hypertension Father        Objective    Temp 97.5 °F (36.4 °C) (Temporal)   Ht 182.9 cm (72\")   Wt 95.3 kg (210 lb)   BMI 28.48 kg/m²     Physical Exam   Constitutional: He is oriented to person, place, and time. He appears well-developed and well-nourished. "   Pulmonary/Chest: Effort normal.   Abdominal: Soft. He exhibits no distension and no mass. There is no tenderness. There is no rebound and no guarding. No hernia.   Genitourinary: Rectal exam shows no mass, no tenderness and anal tone normal. Enlarged: for the age of the patient. Right testis shows no mass, no swelling and no tenderness. Left testis shows no mass, no swelling and no tenderness. No hypospadias. No discharge found.   Genitourinary Comments: Anus and perineum without mass or tenderness. The prostate is approximately 50- ml. It is Symmetric, with a Soft consistency. There are no nodules present. . The seminal vesicles are Not palpable due to the size of the prostate.     Neurological: He is alert and oriented to person, place, and time.   Vitals reviewed.          Results for orders placed or performed in visit on 04/29/20   POC Urinalysis Dipstick, Multipro   Result Value Ref Range    Color Yellow Yellow, Straw, Dark Yellow, Teresa    Clarity, UA Clear Clear    Glucose, UA Negative Negative, 1000 mg/dL (3+) mg/dL    Bilirubin Negative Negative    Ketones, UA Negative Negative    Specific Gravity  1.020 1.005 - 1.030    Blood, UA Trace (A) Negative    pH, Urine 6.0 5.0 - 8.0    Protein, POC Negative Negative mg/dL    Urobilinogen, UA Normal Normal    Nitrite, UA Negative Negative    Leukocytes Negative Negative       Assessment and Plan    Diagnoses and all orders for this visit:    Elevated PSA  -     POC Urinalysis Dipstick, Multipro    BPH with urinary obstruction  -     POC Urinalysis Dipstick, Multipro    Hypogonadism in male    Impotence of organic origin      Patient of Dr. Vasques.  He is undergone a previous negative prostate biopsy for an unknown PSA in 2017.  A.  His last PSA was 4.1.  He underwent a MRI fusion biopsy in June 2019.  His volume was found to be 55.4 cc.  He had evidence of 2 PI-RADS 5 lesions one in the right peripheral zone and one in the left peripheral zone.  His biopsy was  consistent with inflammation.        His PSA today is 1.9     Follow up in one year with repeat PSA.

## 2020-04-29 ENCOUNTER — OFFICE VISIT (OUTPATIENT)
Dept: UROLOGY | Facility: CLINIC | Age: 70
End: 2020-04-29

## 2020-04-29 VITALS — BODY MASS INDEX: 28.44 KG/M2 | WEIGHT: 210 LBS | HEIGHT: 72 IN | TEMPERATURE: 97.5 F

## 2020-04-29 DIAGNOSIS — N13.8 BPH WITH URINARY OBSTRUCTION: ICD-10-CM

## 2020-04-29 DIAGNOSIS — E29.1 HYPOGONADISM IN MALE: ICD-10-CM

## 2020-04-29 DIAGNOSIS — N52.9 IMPOTENCE OF ORGANIC ORIGIN: ICD-10-CM

## 2020-04-29 DIAGNOSIS — R97.20 ELEVATED PSA: Primary | ICD-10-CM

## 2020-04-29 DIAGNOSIS — N40.1 BPH WITH URINARY OBSTRUCTION: ICD-10-CM

## 2020-04-29 LAB
BILIRUB BLD-MCNC: NEGATIVE MG/DL
CLARITY, POC: CLEAR
COLOR UR: YELLOW
GLUCOSE UR STRIP-MCNC: NEGATIVE MG/DL
KETONES UR QL: NEGATIVE
LEUKOCYTE EST, POC: NEGATIVE
NITRITE UR-MCNC: NEGATIVE MG/ML
PH UR: 6 [PH] (ref 5–8)
PROT UR STRIP-MCNC: NEGATIVE MG/DL
RBC # UR STRIP: ABNORMAL /UL
SP GR UR: 1.02 (ref 1–1.03)
UROBILINOGEN UR QL: NORMAL

## 2020-04-29 PROCEDURE — 99213 OFFICE O/P EST LOW 20 MIN: CPT | Performed by: UROLOGY

## 2020-04-29 PROCEDURE — 81003 URINALYSIS AUTO W/O SCOPE: CPT | Performed by: UROLOGY

## 2020-10-21 ENCOUNTER — OFFICE VISIT (OUTPATIENT)
Dept: PRIMARY CARE CLINIC | Age: 70
End: 2020-10-21
Payer: COMMERCIAL

## 2020-10-21 VITALS — TEMPERATURE: 98 F

## 2020-10-21 PROCEDURE — 90686 IIV4 VACC NO PRSV 0.5 ML IM: CPT | Performed by: NURSE PRACTITIONER

## 2020-10-21 PROCEDURE — 90471 IMMUNIZATION ADMIN: CPT | Performed by: NURSE PRACTITIONER

## 2020-10-21 NOTE — PROGRESS NOTES
After obtaining consent, and per orders of Jordan Camacho, injection of flu given by [place]. Patient instructed to remain in clinic for 20 minutes afterwards, and to report any adverse reaction to me immediately.

## 2021-06-03 DIAGNOSIS — R97.20 ELEVATED PSA: Primary | ICD-10-CM

## 2021-06-10 NOTE — PROGRESS NOTES
Subjective    Mr. Harvey is 70 y.o. male    Chief Complaint: PSA    History of Present Illness     PSA 06/09/2021 - 2.2  Patient is here with an elevated PSA in the past.  He was last seen 04/29/2020.  He does not have a family history of prostate cancer.  His most recent PSA is slightly increased at 2.2 compared to 1.9 03/16/2020.  Patient had a negative MRI fusion biopsy 06/2019 by Dr. Wang.  Patient denies any new or worsening symptoms.  AUA score 7 out of 35 with frequency intermittency weak stream straining and nocturia gets up 1 time at night.    The following portions of the patient's history were reviewed and updated as appropriate: allergies, current medications, past family history, past medical history, past social history, past surgical history and problem list.    Review of Systems   Constitutional: Negative for appetite change and fever.   HENT: Negative for hearing loss and sore throat.    Eyes: Negative for pain and redness.   Respiratory: Negative for cough and shortness of breath.    Cardiovascular: Negative for chest pain and leg swelling.   Gastrointestinal: Negative for anal bleeding, nausea and vomiting.   Endocrine: Negative for cold intolerance and heat intolerance.   Genitourinary: Negative for dysuria, flank pain, frequency, hematuria and urgency.   Musculoskeletal: Negative for joint swelling and myalgias.   Skin: Negative for color change and rash.   Allergic/Immunologic: Negative for immunocompromised state.   Neurological: Negative for dizziness and speech difficulty.   Hematological: Negative for adenopathy. Does not bruise/bleed easily.   Psychiatric/Behavioral: Negative for dysphoric mood and suicidal ideas.         Current Outpatient Medications:   •  atorvastatin (LIPITOR) 40 MG tablet, Take 40 mg by mouth Daily., Disp: , Rfl:   •  Cholecalciferol (VITAMIN D PO), Take 3 drops by mouth Every Night., Disp: , Rfl:   •  lisinopril (PRINIVIL,ZESTRIL) 20 MG tablet, Take 20 mg by mouth  Daily., Disp: , Rfl:   •  LUTEIN PO, Take 20 mg by mouth Every Other Day., Disp: , Rfl:   •  Misc Natural Products (PROSTATE HEALTH PO), Take 1 tablet by mouth Daily., Disp: , Rfl:   •  NON FORMULARY, Take 12.5 mg by mouth Daily. DHEA , Disp: , Rfl:   •  sildenafil (REVATIO) 20 MG tablet, Take 20 mg by mouth As Needed., Disp: , Rfl:   •  tamsulosin (FLOMAX) 0.4 MG capsule 24 hr capsule, Take 1 capsule by mouth Every Night., Disp: , Rfl:   •  Testosterone 20 % cream, 150 mg., Disp: , Rfl:   •  BIOTIN 5000 PO, Take 1 tablet by mouth Daily., Disp: , Rfl:     Current Facility-Administered Medications:   •  cefTRIAXone (ROCEPHIN) 1 g/100 mL 0.9% NS (MBP), 1 g, Intravenous, 30 Min Pre-Op, Shankar Wang MD    Past Medical History:   Diagnosis Date   • Arthritis    • Cervical disc disease    • Chronic neck pain    • Elevated PSA    • Enlarged prostate    • Hyperlipidemia    • Hypertension        Past Surgical History:   Procedure Laterality Date   • BACK SURGERY     • FEMUR IM NAILING/RODDING Left 5/21/2018    Procedure: FEMUR INTRAMEDULLARY NAILING/RODDING;  Surgeon: Hilario Owen MD;  Location:  [facility];  Service: Orthopedics   • FRACTURE SURGERY      left thigh   • PROSTATE BIOPSY     • PROSTATE BIOPSY N/A 6/10/2019    Procedure: PROSTATE ULTRASOUND BIOPSY MRI FUSION WITH URONAV;  Surgeon: Shankar Wang MD;  Location:  [facility];  Service: Urology       Social History     Socioeconomic History   • Marital status:      Spouse name: Not on file   • Number of children: Not on file   • Years of education: Not on file   • Highest education level: Not on file   Tobacco Use   • Smoking status: Never Smoker   • Smokeless tobacco: Never Used   Vaping Use   • Vaping Use: Never used   Substance and Sexual Activity   • Alcohol use: Yes     Alcohol/week: 1.0 standard drinks     Types: 1 Cans of beer per week     Comment: only occasionally   • Drug use: No   • Sexual activity: Yes     Partners: Female      "Birth control/protection: None       Family History   Problem Relation Age of Onset   • Cancer Mother    • Heart disease Father    • Hyperlipidemia Father    • Hypertension Father        Objective    Temp 97.5 °F (36.4 °C) (Temporal)   Ht 182.9 cm (72\")   Wt 97.5 kg (215 lb)   BMI 29.16 kg/m²     Physical Exam  Vitals reviewed.   Constitutional:       Appearance: Normal appearance.   HENT:      Head: Normocephalic and atraumatic.      Right Ear: External ear normal.      Left Ear: External ear normal.      Nose: No congestion.   Eyes:      Conjunctiva/sclera: Conjunctivae normal.   Neck:      Comments: I observed no obvious neck masses.  Pulmonary:      Effort: Pulmonary effort is normal.   Genitourinary:     Prostate: Enlarged. Not tender and no nodules present.      Rectum: Normal.      Comments: His rectal exam revealed a smooth symmetric prostate no suspicious lesions masses or asymmetry were palpated.  Neurological:      General: No focal deficit present.      Mental Status: He is alert and oriented to person, place, and time.   Psychiatric:         Mood and Affect: Mood normal.         Behavior: Behavior normal.             Results for orders placed or performed in visit on 06/14/21   POC Urinalysis Dipstick, Multipro    Specimen: Urine   Result Value Ref Range    Color Yellow Yellow, Straw, Dark Yellow, Teresa    Clarity, UA Clear Clear    Glucose, UA Negative Negative, 1000 mg/dL (3+) mg/dL    Bilirubin Negative Negative    Ketones, UA Negative Negative    Specific Gravity  1.025 1.005 - 1.030    Blood, UA Negative Negative    pH, Urine 6.0 5.0 - 8.0    Protein, POC Trace (A) Negative mg/dL    Urobilinogen, UA Normal Normal    Nitrite, UA Negative Negative    Leukocytes Negative Negative     Assessment and Plan    Diagnoses and all orders for this visit:    1. Elevated PSA (Primary)  -     POC Urinalysis Dipstick, Multipro  -     PSA DIAGNOSTIC; Future    Symptoms stable PSA is slightly increased it is 2.2 " compared to 1.9 last year.  Digital rectal exam was benign follow-up 1 year with PSA prior.

## 2021-06-14 ENCOUNTER — OFFICE VISIT (OUTPATIENT)
Dept: UROLOGY | Facility: CLINIC | Age: 71
End: 2021-06-14

## 2021-06-14 VITALS — HEIGHT: 72 IN | TEMPERATURE: 97.5 F | BODY MASS INDEX: 29.12 KG/M2 | WEIGHT: 215 LBS

## 2021-06-14 DIAGNOSIS — R97.20 ELEVATED PSA: Primary | ICD-10-CM

## 2021-06-14 LAB
BILIRUB BLD-MCNC: NEGATIVE MG/DL
CLARITY, POC: CLEAR
COLOR UR: YELLOW
GLUCOSE UR STRIP-MCNC: NEGATIVE MG/DL
KETONES UR QL: NEGATIVE
LEUKOCYTE EST, POC: NEGATIVE
NITRITE UR-MCNC: NEGATIVE MG/ML
PH UR: 6 [PH] (ref 5–8)
PROT UR STRIP-MCNC: ABNORMAL MG/DL
RBC # UR STRIP: NEGATIVE /UL
SP GR UR: 1.02 (ref 1–1.03)
UROBILINOGEN UR QL: NORMAL

## 2021-06-14 PROCEDURE — 99213 OFFICE O/P EST LOW 20 MIN: CPT | Performed by: PHYSICIAN ASSISTANT

## 2021-06-14 PROCEDURE — 81003 URINALYSIS AUTO W/O SCOPE: CPT | Performed by: PHYSICIAN ASSISTANT

## 2022-03-12 SDOH — HEALTH STABILITY: PHYSICAL HEALTH: ON AVERAGE, HOW MANY MINUTES DO YOU ENGAGE IN EXERCISE AT THIS LEVEL?: 80 MIN

## 2022-03-12 SDOH — HEALTH STABILITY: PHYSICAL HEALTH: ON AVERAGE, HOW MANY DAYS PER WEEK DO YOU ENGAGE IN MODERATE TO STRENUOUS EXERCISE (LIKE A BRISK WALK)?: 2 DAYS

## 2022-03-14 ENCOUNTER — OFFICE VISIT (OUTPATIENT)
Dept: PRIMARY CARE CLINIC | Age: 72
End: 2022-03-14
Payer: COMMERCIAL

## 2022-03-14 VITALS
TEMPERATURE: 98.2 F | BODY MASS INDEX: 28.71 KG/M2 | DIASTOLIC BLOOD PRESSURE: 68 MMHG | SYSTOLIC BLOOD PRESSURE: 128 MMHG | HEART RATE: 56 BPM | WEIGHT: 212 LBS | OXYGEN SATURATION: 95 % | HEIGHT: 72 IN

## 2022-03-14 DIAGNOSIS — N52.9 ED (ERECTILE DYSFUNCTION) OF ORGANIC ORIGIN: ICD-10-CM

## 2022-03-14 DIAGNOSIS — E78.2 MIXED HYPERLIPIDEMIA: ICD-10-CM

## 2022-03-14 DIAGNOSIS — N40.1 BENIGN PROSTATIC HYPERPLASIA WITH URINARY HESITANCY: ICD-10-CM

## 2022-03-14 DIAGNOSIS — R39.11 BENIGN PROSTATIC HYPERPLASIA WITH URINARY HESITANCY: ICD-10-CM

## 2022-03-14 DIAGNOSIS — R53.83 FATIGUE, UNSPECIFIED TYPE: ICD-10-CM

## 2022-03-14 DIAGNOSIS — I10 PRIMARY HYPERTENSION: ICD-10-CM

## 2022-03-14 DIAGNOSIS — R79.89 LOW TESTOSTERONE IN MALE: ICD-10-CM

## 2022-03-14 DIAGNOSIS — E11.9 TYPE 2 DIABETES MELLITUS WITHOUT COMPLICATION, WITHOUT LONG-TERM CURRENT USE OF INSULIN (HCC): Primary | ICD-10-CM

## 2022-03-14 PROCEDURE — 1036F TOBACCO NON-USER: CPT | Performed by: PEDIATRICS

## 2022-03-14 PROCEDURE — G8484 FLU IMMUNIZE NO ADMIN: HCPCS | Performed by: PEDIATRICS

## 2022-03-14 PROCEDURE — 1123F ACP DISCUSS/DSCN MKR DOCD: CPT | Performed by: PEDIATRICS

## 2022-03-14 PROCEDURE — 4040F PNEUMOC VAC/ADMIN/RCVD: CPT | Performed by: PEDIATRICS

## 2022-03-14 PROCEDURE — 3017F COLORECTAL CA SCREEN DOC REV: CPT | Performed by: PEDIATRICS

## 2022-03-14 PROCEDURE — 99204 OFFICE O/P NEW MOD 45 MIN: CPT | Performed by: PEDIATRICS

## 2022-03-14 PROCEDURE — G8417 CALC BMI ABV UP PARAM F/U: HCPCS | Performed by: PEDIATRICS

## 2022-03-14 PROCEDURE — 3046F HEMOGLOBIN A1C LEVEL >9.0%: CPT | Performed by: PEDIATRICS

## 2022-03-14 PROCEDURE — 2022F DILAT RTA XM EVC RTNOPTHY: CPT | Performed by: PEDIATRICS

## 2022-03-14 PROCEDURE — G8427 DOCREV CUR MEDS BY ELIG CLIN: HCPCS | Performed by: PEDIATRICS

## 2022-03-14 RX ORDER — ATORVASTATIN CALCIUM 40 MG/1
40 TABLET, FILM COATED ORAL DAILY
Qty: 90 TABLET | Refills: 3 | Status: SHIPPED | OUTPATIENT
Start: 2022-03-14

## 2022-03-14 RX ORDER — TAMSULOSIN HYDROCHLORIDE 0.4 MG/1
0.4 CAPSULE ORAL NIGHTLY
Qty: 90 CAPSULE | Refills: 3 | Status: SHIPPED | OUTPATIENT
Start: 2022-03-14

## 2022-03-14 RX ORDER — GLUCOSAMINE/CHONDR SU A SOD 750-600 MG
1 TABLET ORAL DAILY
COMMUNITY

## 2022-03-14 RX ORDER — ATORVASTATIN CALCIUM 40 MG/1
1 TABLET, FILM COATED ORAL DAILY
COMMUNITY
Start: 2022-02-14 | End: 2022-03-14 | Stop reason: SDUPTHER

## 2022-03-14 RX ORDER — TAMSULOSIN HYDROCHLORIDE 0.4 MG/1
1 CAPSULE ORAL NIGHTLY
COMMUNITY
End: 2022-03-14 | Stop reason: SDUPTHER

## 2022-03-14 RX ORDER — SILDENAFIL CITRATE 20 MG/1
20 TABLET ORAL PRN
COMMUNITY

## 2022-03-14 RX ORDER — SPIRONOLACTONE 25 MG/1
25 TABLET ORAL DAILY
COMMUNITY
End: 2022-03-14

## 2022-03-14 RX ORDER — LISINOPRIL 20 MG/1
20 TABLET ORAL DAILY
Qty: 90 TABLET | Refills: 3 | Status: SHIPPED | OUTPATIENT
Start: 2022-03-14

## 2022-03-14 RX ORDER — LISINOPRIL 20 MG/1
20 TABLET ORAL DAILY
COMMUNITY
End: 2022-03-14 | Stop reason: SDUPTHER

## 2022-03-14 SDOH — ECONOMIC STABILITY: FOOD INSECURITY: WITHIN THE PAST 12 MONTHS, THE FOOD YOU BOUGHT JUST DIDN'T LAST AND YOU DIDN'T HAVE MONEY TO GET MORE.: NEVER TRUE

## 2022-03-14 SDOH — ECONOMIC STABILITY: FOOD INSECURITY: WITHIN THE PAST 12 MONTHS, YOU WORRIED THAT YOUR FOOD WOULD RUN OUT BEFORE YOU GOT MONEY TO BUY MORE.: NEVER TRUE

## 2022-03-14 ASSESSMENT — ENCOUNTER SYMPTOMS
EYES NEGATIVE: 1
RESPIRATORY NEGATIVE: 1
GASTROINTESTINAL NEGATIVE: 1
ALLERGIC/IMMUNOLOGIC NEGATIVE: 1

## 2022-03-14 ASSESSMENT — PATIENT HEALTH QUESTIONNAIRE - PHQ9
SUM OF ALL RESPONSES TO PHQ QUESTIONS 1-9: 0
SUM OF ALL RESPONSES TO PHQ QUESTIONS 1-9: 0
SUM OF ALL RESPONSES TO PHQ9 QUESTIONS 1 & 2: 0
2. FEELING DOWN, DEPRESSED OR HOPELESS: 0
1. LITTLE INTEREST OR PLEASURE IN DOING THINGS: 0
SUM OF ALL RESPONSES TO PHQ QUESTIONS 1-9: 0
SUM OF ALL RESPONSES TO PHQ QUESTIONS 1-9: 0

## 2022-03-14 ASSESSMENT — SOCIAL DETERMINANTS OF HEALTH (SDOH): HOW HARD IS IT FOR YOU TO PAY FOR THE VERY BASICS LIKE FOOD, HOUSING, MEDICAL CARE, AND HEATING?: NOT HARD AT ALL

## 2022-03-14 NOTE — PROGRESS NOTES
[facility], [address]  Phone [phone]   Fax [phone]      OFFICE VISIT: 3/14/2022    Cindy Rios-: 1950      HPI  Reason For Visit:  Brandon Shin is a 66 y.o. Establish Care (previous PCP Farrah Renee. Needing to establish care. ) and Health Maintenance (colon- 5yrs with Dr. Michael Montoya)    Patient presents to establish care. Present concerns:  No new concerns. Hypertension:   BP today was   BP Readings from Last 1 Encounters:   22 128/68      Recent BP readings:    BP Readings from Last 3 Encounters:   22 128/68     Medication   Lisinopril 20 mg daily   Spironolactone 25 mg daily  Medication compliance:  compliant most of the time  Home blood pressure monitoring: Yes - controlled. He is somewhat adherent to a low sodium diet. Symptoms: none  Laboratory:  No results found for: BUN, CREATININE      Hyperlipidemia:   Medication:   atorvastatin (Lipitor) 40mg nightly  Low Fat, Low Choleterol Diet:  yes - to some degree  Myalgias or GI upset: no  The patient exercises intermittently. Laboratory:    No results found for: CHOL, TRIG, HDL, LDLCALC, LDLDIRECT   No results found for: ALT, AST      CKD 3a  He is on RAAS therapy. He is on aldosterone inhibition. He is not on any nephrotoxic drugs. Most recent potassium was 4.6. This was on 10/25/2022      BPH:  Medication:   Tamsulosin 0.4 mg daily  Symptoms: this is controlled      Organic ED:  Medication:   Sildenafil 100mg daily  Symptoms: this is effective when he takes it. Low Testosterone:  He was on testosterone cypionate and anastrazole (he is no longer on this regimen)  He is now on Testosterone Compound 150mg 1 pump nightly  T levels are in the normal range. height is 6' (1.829 m) and weight is 212 lb (96.2 kg). His temporal temperature is 98.2 °F (36.8 °C). His blood pressure is 128/68 and his pulse is 56. His oxygen saturation is 95%.       Body mass index is 28.75 kg/m².    I have reviewed the following with the Mr. Rios   Lab Review  No visits with results within 6 Month(s) from this visit. Latest known visit with results is:   No results found for any previous visit. Copies of these are in the chart. Current Outpatient Medications   Medication Sig Dispense Refill    Cholecalciferol (VITAMIN D3) 125 MCG (5000 UT) CAPS Take 1 tablet by mouth daily      Lutein-Zeaxanthin 25-5 MG CAPS Take 1 tablet by mouth daily      sildenafil (REVATIO) 20 MG tablet Take 20 mg by mouth as needed      Specialty Vitamins Products (BIOTIN PLUS KERATIN) 28742-956 MCG-MG TABS Take 1 tablet by mouth daily      GENERIC EXTERNAL MEDICATION Place 1 actuation onto the skin at bedtime Testosterone 150mg 30gms- [facility].  atorvastatin (LIPITOR) 40 MG tablet Take 1 tablet by mouth daily 90 tablet 3    lisinopril (PRINIVIL;ZESTRIL) 20 MG tablet Take 1 tablet by mouth daily 90 tablet 3    tamsulosin (FLOMAX) 0.4 MG capsule Take 1 capsule by mouth nightly 90 capsule 3     No current facility-administered medications for this visit. Allergies: Sulfa antibiotics     Past Medical History:   Diagnosis Date    Hyperlipidemia     Hypertension        Family History   Problem Relation Age of Onset    Cancer Mother     Heart Disease Father        Past Surgical History:   Procedure Laterality Date    LEG SURGERY Left     PROSTATE BIOPSY         Social History     Tobacco Use    Smoking status: Never Smoker    Smokeless tobacco: Never Used   Substance Use Topics    Alcohol use: Not Currently        Review of Systems   Constitutional: Negative. HENT: Negative. Eyes: Negative. Respiratory: Negative. Cardiovascular: Negative. Gastrointestinal: Negative. Endocrine: Negative. Genitourinary: Negative. Musculoskeletal: Negative. Skin: Negative. Allergic/Immunologic: Negative. Neurological: Negative. Hematological: Negative. Behavior: Behavior normal.           ASSESSMENT      ICD-10-CM    1. Type 2 diabetes mellitus without complication, without long-term current use of insulin ([facility])  E11.9 Microalbumin / Creatinine Urine Ratio   2. Primary hypertension  I10 CBC with Auto Differential     Comprehensive Metabolic Panel     Microalbumin / Creatinine Urine Ratio     lisinopril (PRINIVIL;ZESTRIL) 20 MG tablet   3. Mixed hyperlipidemia  E78.2 Lipid Panel     atorvastatin (LIPITOR) 40 MG tablet   4. Benign prostatic hyperplasia with urinary hesitancy  N40.1 PSA Screening    R39.11 tamsulosin (FLOMAX) 0.4 MG capsule   5. ED (erectile dysfunction) of organic origin  N52.9 PSA Screening   6. Low testosterone in male  R79.89 PSA Screening     Testosterone, free, total   7. Fatigue, unspecified type  R53.83 T4, Free     TSH         PLAN    1. Type 2 diabetes mellitus without complication, without long-term current use of insulin ([facility] [address]  He states that he does not carry this diagnosis. - Microalbumin / Creatinine Urine Ratio; Future    2. Primary hypertension  Controlled on present regimen  - CBC with Auto Differential; Future  - Comprehensive Metabolic Panel; Future  - Microalbumin / Creatinine Urine Ratio; Future  - lisinopril (PRINIVIL;ZESTRIL) 20 MG tablet; Take 1 tablet by mouth daily  Dispense: 90 tablet; Refill: 3    3. Mixed hyperlipidemia  This has been controlled on present regimen  - Lipid Panel; Future  - atorvastatin (LIPITOR) 40 MG tablet; Take 1 tablet by mouth daily  Dispense: 90 tablet; Refill: 3    4. Benign prostatic hyperplasia with urinary hesitancy  He is doing well on this regimen  - PSA Screening; Future  - tamsulosin (FLOMAX) 0.4 MG capsule; Take 1 capsule by mouth nightly  Dispense: 90 capsule; Refill: 3    5. ED (erectile dysfunction) of organic origin  He uses sildenafil prn  - PSA Screening; Future    6.  Low testosterone in male  Will continue present testosterone regimen  He is on 150 mg Testosterone compound at [facility]

## 2022-05-04 ENCOUNTER — TELEPHONE (OUTPATIENT)
Dept: UROLOGY | Facility: CLINIC | Age: 72
End: 2022-05-04

## 2022-05-04 NOTE — TELEPHONE ENCOUNTER
Caller: Michele Harvey    Relationship: Self    Best call back number: [phone]    What orders are you requesting (i.e. lab or imaging): PSA    In what timeframe would the patient need to come in: PATIENT'S ANNUAL IS DUE 6/14/22.     Where will you receive your lab/imaging services: [facility]    Additional notes: PATIENT MENTIONED TESTOSTERONE AS WELL BUT WASN'T SURE IF PROVIDER WANTED THAT DONE TOO.

## 2022-05-04 NOTE — TELEPHONE ENCOUNTER
Caller: Michele Harvey    Relationship to patient: Self    Best call back number: [phone]    Patient is needing: PATIENT WOULD LIKE TO DISCUSS UROLIFT AT HIS YEARLY APPOINTMENT. PER RT WARM TRANSFER BUT HUB WAS UNABLE TO REACH PRACTICE.     PATIENT REQUESTING A CALL BACK

## 2022-05-19 ENCOUNTER — OFFICE VISIT (OUTPATIENT)
Dept: PRIMARY CARE CLINIC | Age: 72
End: 2022-05-19
Payer: COMMERCIAL

## 2022-05-19 VITALS
DIASTOLIC BLOOD PRESSURE: 70 MMHG | BODY MASS INDEX: 29.26 KG/M2 | WEIGHT: 216 LBS | RESPIRATION RATE: 15 BRPM | TEMPERATURE: 97.6 F | HEART RATE: 62 BPM | SYSTOLIC BLOOD PRESSURE: 117 MMHG | OXYGEN SATURATION: 95 % | HEIGHT: 72 IN

## 2022-05-19 DIAGNOSIS — M62.08 DIASTASIS RECTI: Primary | ICD-10-CM

## 2022-05-19 PROCEDURE — 1036F TOBACCO NON-USER: CPT | Performed by: NURSE PRACTITIONER

## 2022-05-19 PROCEDURE — 3017F COLORECTAL CA SCREEN DOC REV: CPT | Performed by: NURSE PRACTITIONER

## 2022-05-19 PROCEDURE — 1123F ACP DISCUSS/DSCN MKR DOCD: CPT | Performed by: NURSE PRACTITIONER

## 2022-05-19 PROCEDURE — G8417 CALC BMI ABV UP PARAM F/U: HCPCS | Performed by: NURSE PRACTITIONER

## 2022-05-19 PROCEDURE — 99213 OFFICE O/P EST LOW 20 MIN: CPT | Performed by: NURSE PRACTITIONER

## 2022-05-19 PROCEDURE — 4040F PNEUMOC VAC/ADMIN/RCVD: CPT | Performed by: NURSE PRACTITIONER

## 2022-05-19 PROCEDURE — G8427 DOCREV CUR MEDS BY ELIG CLIN: HCPCS | Performed by: NURSE PRACTITIONER

## 2022-05-19 ASSESSMENT — ENCOUNTER SYMPTOMS
COUGH: 0
DIARRHEA: 0
SHORTNESS OF BREATH: 0
TROUBLE SWALLOWING: 0
NAUSEA: 0
SORE THROAT: 0
VOMITING: 0
RHINORRHEA: 0
ABDOMINAL PAIN: 0
CONSTIPATION: 0

## 2022-05-19 NOTE — PROGRESS NOTES
Holland Solares (:  1950) is a 66 y.o. male,Established patient, here for evaluation of the following chief complaint(s):  Abdominal Pain (pt states he thinks he has a hernia, bulges out whenhe works out- noticed 2 days ago)      ASSESSMENT/PLAN:    ICD-10-CM    1. Diastasis recti  M62.08        Return if symptoms worsen or fail to improve. SUBJECTIVE/OBJECTIVE:  HPI  Here for abdominal bulge  Get bulge when I do sit ups. Prominent - never been stuck up that much before. No pain. Denies any other issues. /70 (Site: Right Upper Arm, Position: Sitting, Cuff Size: Medium Adult)   Pulse 62   Temp 97.6 °F (36.4 °C) (Temporal)   Resp 15   Ht 6' (1.829 m)   Wt 216 lb (98 kg)   SpO2 95%   BMI 29.29 kg/m²     Review of Systems   Constitutional: Negative for activity change, appetite change, fatigue, fever and unexpected weight change. HENT: Negative for ear pain, rhinorrhea, sore throat and trouble swallowing. Eyes: Negative for visual disturbance. Respiratory: Negative for cough and shortness of breath. Cardiovascular: Negative for chest pain, palpitations and leg swelling. Gastrointestinal: Negative for abdominal pain, constipation, diarrhea, nausea and vomiting. Questionable hernia   Genitourinary: Negative for flank pain. Musculoskeletal: Negative for arthralgias, myalgias, neck pain and neck stiffness. Neurological: Negative for headaches. Psychiatric/Behavioral: Negative for decreased concentration and sleep disturbance. The patient is not nervous/anxious. Physical Exam  Vitals reviewed. Constitutional:       Appearance: Normal appearance. HENT:      Head: Normocephalic and atraumatic. Eyes:      Conjunctiva/sclera: Conjunctivae normal.   Cardiovascular:      Rate and Rhythm: Normal rate and regular rhythm. Pulses: Normal pulses. Heart sounds: Normal heart sounds.    Pulmonary:      Effort: Pulmonary effort is normal.      Breath sounds: Normal breath sounds. Abdominal:      General: Bowel sounds are normal. There is no distension. Palpations: Abdomen is soft. Tenderness: There is no abdominal tenderness. There is no guarding. Comments: Abdominal tenting with increased intrathoracic pressure. (diastasis recti)   Musculoskeletal:         General: Normal range of motion. Cervical back: Normal range of motion and neck supple. Skin:     General: Skin is warm. Neurological:      General: No focal deficit present. Mental Status: He is alert. An electronic signature was used to authenticate this note.     --PARVIZ Cuevas

## 2022-05-20 DIAGNOSIS — R79.89 LOW TESTOSTERONE IN MALE: Primary | ICD-10-CM

## 2022-05-20 NOTE — TELEPHONE ENCOUNTER
Received fax from pharmacy requesting refill on pts medication(s). Pt was last seen in office on 5/19/2022  and has a follow up scheduled for 9/14/2022. Will send request to  Dr. Jimmie Daniel  for authorization.      Requested Prescriptions     Pending Prescriptions Disp Refills    Testosterone 20 % CREA [Pharmacy Med Name: TESTOSTERONE 150 MG CREAM (TESTOSTERONE  POWDER, HRT BOTANICAL  CREAM)] 30 each 5     Sig: APPLY ONE PUMP (ONE GRAM) TO THE UPPER SHOULDER OR INNER THIGH AS DIRECTED

## 2022-06-03 DIAGNOSIS — R97.20 ELEVATED PSA: ICD-10-CM

## 2022-06-08 DIAGNOSIS — Z12.5 SCREENING FOR PROSTATE CANCER: Primary | ICD-10-CM

## 2022-06-08 LAB — PROSTATE SPECIFIC ANTIGEN: 3 NG/ML

## 2022-06-09 ENCOUNTER — TELEPHONE (OUTPATIENT)
Dept: PRIMARY CARE CLINIC | Age: 72
End: 2022-06-09

## 2022-06-09 NOTE — TELEPHONE ENCOUNTER
----- Message from [address] [facility],[address], DO sent at 6/8/2022  3:12 PM CDT -----  PSA is normal

## 2022-06-09 NOTE — TELEPHONE ENCOUNTER
Called pt to inform him of this. This was supposed to be ordered under Amber Turner at [facility].   I faxed it to him through [facility]

## 2022-06-13 NOTE — PROGRESS NOTES
Subjective    Mr. Harvey is 71 y.o. male    Chief Complaint: 1 Year follow up for Elevated PSA/ Patient wants to talk about Urolift.    History of Present Illness  Elevated PSA/BPH  Patient is here with an elevated PSA. The PSA was drawna few days days. He does not have a family history of prostate cancer. Voiding symptoms include Incomplete emptying and hesitantcy. Denies Dysuria and Hematuria. Voiding symptoms began a few years ago.  These have been gradual in onset. negative--06/10/2019.  Previous PSA values are :   Lab Results   Component Value Date    PSA 3.0 06/03/2022        PSA                                                               2.2      06/2021               His PSA has been 6.3 around the time he had the biopsy.                           The following portions of the patient's history were reviewed and updated as appropriate: allergies, current medications, past family history, past medical history, past social history, past surgical history and problem list.    Review of Systems   Genitourinary: Positive for difficulty urinating. Negative for decreased urine volume, dysuria, enuresis, flank pain, frequency, genital sores, hematuria, penile discharge, penile pain, penile swelling, scrotal swelling, testicular pain and urgency.         Current Outpatient Medications:   •  atorvastatin (LIPITOR) 40 MG tablet, Take 40 mg by mouth Daily., Disp: , Rfl:   •  BIOTIN 5000 PO, Take 1 tablet by mouth Daily., Disp: , Rfl:   •  Cholecalciferol (VITAMIN D PO), Take 3 drops by mouth Every Night., Disp: , Rfl:   •  lisinopril (PRINIVIL,ZESTRIL) 20 MG tablet, Take 20 mg by mouth Daily., Disp: , Rfl:   •  LUTEIN PO, Take 20 mg by mouth Every Other Day., Disp: , Rfl:   •  Misc Natural Products (PROSTATE HEALTH PO), Take 1 tablet by mouth Daily., Disp: , Rfl:   •  sildenafil (REVATIO) 20 MG tablet, Take 20 mg by mouth As Needed., Disp: , Rfl:   •  tamsulosin (FLOMAX) 0.4 MG capsule 24 hr capsule, Take 1 capsule  "by mouth Every Night., Disp: , Rfl:   •  Testosterone 20 % cream, 150 mg., Disp: , Rfl:   •  NON FORMULARY, Take 12.5 mg by mouth Daily. DHEA , Disp: , Rfl:     Current Facility-Administered Medications:   •  cefTRIAXone (ROCEPHIN) 1 g/100 mL 0.9% NS (MBP), 1 g, Intravenous, 30 Min Pre-Op, Shankar Wang MD    Past Medical History:   Diagnosis Date   • Arthritis    • Cervical disc disease    • Chronic neck pain    • Elevated PSA    • Enlarged prostate    • Hyperlipidemia    • Hypertension        Past Surgical History:   Procedure Laterality Date   • BACK SURGERY     • FEMUR IM NAILING/RODDING Left 5/21/2018    Procedure: FEMUR INTRAMEDULLARY NAILING/RODDING;  Surgeon: Hilario Owen MD;  Location:  [facility] OR;  Service: Orthopedics   • FRACTURE SURGERY      left thigh   • PROSTATE BIOPSY     • PROSTATE BIOPSY N/A 6/10/2019    Procedure: PROSTATE ULTRASOUND BIOPSY MRI FUSION WITH URONAV;  Surgeon: Shankar Wang MD;  Location:  [facility] OR;  Service: Urology       Social History     Socioeconomic History   • Marital status:    Tobacco Use   • Smoking status: Never Smoker   • Smokeless tobacco: Never Used   Vaping Use   • Vaping Use: Never used   Substance and Sexual Activity   • Alcohol use: Yes     Alcohol/week: 1.0 standard drink     Types: 1 Cans of beer per week     Comment: only occasionally   • Drug use: No   • Sexual activity: Yes     Partners: Female     Birth control/protection: None       Family History   Problem Relation Age of Onset   • Cancer Mother    • Heart disease Father    • Hyperlipidemia Father    • Hypertension Father        Objective    Temp 98.2 °F (36.8 °C) (Temporal)   Ht 182.9 cm (72\")   Wt 98.1 kg (216 lb 3.2 oz)   BMI 29.32 kg/m²     Physical Exam  Vitals reviewed.   Constitutional:       Appearance: Normal appearance.   HENT:      Head: Normocephalic and atraumatic.      Nose: No congestion.   Pulmonary:      Effort: Pulmonary effort is normal.   Skin:     " Coloration: Skin is not pale.   Neurological:      Mental Status: He is alert and oriented to person, place, and time.   Psychiatric:         Mood and Affect: Mood normal.         Behavior: Behavior normal.             Results for orders placed or performed in visit on 06/14/22   POC Urinalysis Dipstick, Multipro    Specimen: Urine   Result Value Ref Range    Color Yellow Yellow, Straw, Dark Yellow, Teresa    Clarity, UA Clear Clear    Glucose, UA Negative Negative, 1000 mg/dL (3+) mg/dL    Bilirubin Negative Negative    Ketones, UA Negative Negative    Specific Gravity  1.030 1.005 - 1.030    Blood, UA Negative Negative    pH, Urine 6.5 5.0 - 8.0    Protein, POC Negative Negative mg/dL    Urobilinogen, UA Normal Normal    Nitrite, UA Negative Negative    Leukocytes Negative Negative     Assessment and Plan    Diagnoses and all orders for this visit:    1. Elevated PSA (Primary)  -     POC Urinalysis Dipstick, Multipro  -     PSA DIAGNOSTIC; Future    2. BPH with obstruction/lower urinary tract symptoms    Regarding his PSA it has increased from 2.2-3.0 from previous visit digital rectal exam was benign he has had a previous negative biopsy which is an MRI fusion biopsy in 2019 around that time he had a PSA of 6.3.  For now we will just continue to monitor PSA with repeat in 1 year.     Regarding his urine symptoms he has symptoms despite taking tamsulosin I did discuss surgical options such as TURP he was interested in a UroLift procedure I gave him a pamphlet on the procedure I did explain to him should he decide he would like to be considered for surgical intervention he would need a cystoscopy in the office first.  He states for now he will live with his symptoms for another year therefore he will return in 1 year PSA prior.

## 2022-06-14 ENCOUNTER — OFFICE VISIT (OUTPATIENT)
Dept: UROLOGY | Facility: CLINIC | Age: 72
End: 2022-06-14

## 2022-06-14 VITALS — HEIGHT: 72 IN | WEIGHT: 216.2 LBS | BODY MASS INDEX: 29.28 KG/M2 | TEMPERATURE: 98.2 F

## 2022-06-14 DIAGNOSIS — N13.8 BPH WITH OBSTRUCTION/LOWER URINARY TRACT SYMPTOMS: ICD-10-CM

## 2022-06-14 DIAGNOSIS — N40.1 BPH WITH OBSTRUCTION/LOWER URINARY TRACT SYMPTOMS: ICD-10-CM

## 2022-06-14 DIAGNOSIS — R97.20 ELEVATED PSA: Primary | ICD-10-CM

## 2022-06-14 LAB
BILIRUB BLD-MCNC: NEGATIVE MG/DL
CLARITY, POC: CLEAR
COLOR UR: YELLOW
GLUCOSE UR STRIP-MCNC: NEGATIVE MG/DL
KETONES UR QL: NEGATIVE
LEUKOCYTE EST, POC: NEGATIVE
NITRITE UR-MCNC: NEGATIVE MG/ML
PH UR: 6.5 [PH] (ref 5–8)
PROT UR STRIP-MCNC: NEGATIVE MG/DL
RBC # UR STRIP: NEGATIVE /UL
SP GR UR: 1.03 (ref 1–1.03)
UROBILINOGEN UR QL: NORMAL

## 2022-06-14 PROCEDURE — 99214 OFFICE O/P EST MOD 30 MIN: CPT | Performed by: PHYSICIAN ASSISTANT

## 2022-06-14 PROCEDURE — 81001 URINALYSIS AUTO W/SCOPE: CPT | Performed by: PHYSICIAN ASSISTANT

## 2022-07-22 DIAGNOSIS — E11.9 TYPE 2 DIABETES MELLITUS WITHOUT COMPLICATION, WITHOUT LONG-TERM CURRENT USE OF INSULIN (HCC): Primary | ICD-10-CM

## 2022-07-26 DIAGNOSIS — R53.83 FATIGUE, UNSPECIFIED TYPE: ICD-10-CM

## 2022-07-26 DIAGNOSIS — I10 PRIMARY HYPERTENSION: ICD-10-CM

## 2022-07-26 DIAGNOSIS — E11.9 TYPE 2 DIABETES MELLITUS WITHOUT COMPLICATION, WITHOUT LONG-TERM CURRENT USE OF INSULIN (HCC): ICD-10-CM

## 2022-07-26 DIAGNOSIS — E78.2 MIXED HYPERLIPIDEMIA: ICD-10-CM

## 2022-07-26 DIAGNOSIS — R79.89 LOW TESTOSTERONE IN MALE: ICD-10-CM

## 2022-07-26 LAB
ALBUMIN SERPL-MCNC: 3.8 G/DL (ref 3.5–5.2)
ALP BLD-CCNC: 80 U/L (ref 40–130)
ALT SERPL-CCNC: 12 U/L (ref 5–41)
ANION GAP SERPL CALCULATED.3IONS-SCNC: 9 MMOL/L (ref 7–19)
AST SERPL-CCNC: 22 U/L (ref 5–40)
BASOPHILS ABSOLUTE: 0.1 K/UL (ref 0–0.2)
BASOPHILS RELATIVE PERCENT: 1.6 % (ref 0–1)
BILIRUB SERPL-MCNC: 0.6 MG/DL (ref 0.2–1.2)
BUN BLDV-MCNC: 17 MG/DL (ref 8–23)
CALCIUM SERPL-MCNC: 9.1 MG/DL (ref 8.8–10.2)
CHLORIDE BLD-SCNC: 104 MMOL/L (ref 98–111)
CHOLESTEROL, TOTAL: 151 MG/DL (ref 160–199)
CO2: 25 MMOL/L (ref 22–29)
CREAT SERPL-MCNC: 1.3 MG/DL (ref 0.5–1.2)
CREATININE URINE: 278.8 MG/DL (ref 4.2–622)
EOSINOPHILS ABSOLUTE: 0.3 K/UL (ref 0–0.6)
EOSINOPHILS RELATIVE PERCENT: 4.9 % (ref 0–5)
GFR AFRICAN AMERICAN: >59
GFR NON-AFRICAN AMERICAN: 54
GLUCOSE BLD-MCNC: 100 MG/DL (ref 74–109)
HBA1C MFR BLD: 6.2 % (ref 4–6)
HCT VFR BLD CALC: 44.4 % (ref 42–52)
HDLC SERPL-MCNC: 60 MG/DL (ref 55–121)
HEMOGLOBIN: 14 G/DL (ref 14–18)
IMMATURE GRANULOCYTES #: 0 K/UL
LDL CHOLESTEROL CALCULATED: 77 MG/DL
LYMPHOCYTES ABSOLUTE: 1.5 K/UL (ref 1.1–4.5)
LYMPHOCYTES RELATIVE PERCENT: 30 % (ref 20–40)
MCH RBC QN AUTO: 29.2 PG (ref 27–31)
MCHC RBC AUTO-ENTMCNC: 31.5 G/DL (ref 33–37)
MCV RBC AUTO: 92.5 FL (ref 80–94)
MICROALBUMIN UR-MCNC: <1.2 MG/DL (ref 0–19)
MICROALBUMIN/CREAT UR-RTO: NORMAL MG/G
MONOCYTES ABSOLUTE: 0.4 K/UL (ref 0–0.9)
MONOCYTES RELATIVE PERCENT: 8.7 % (ref 0–10)
NEUTROPHILS ABSOLUTE: 2.8 K/UL (ref 1.5–7.5)
NEUTROPHILS RELATIVE PERCENT: 54.8 % (ref 50–65)
PDW BLD-RTO: 14.1 % (ref 11.5–14.5)
PLATELET # BLD: 198 K/UL (ref 130–400)
PMV BLD AUTO: 10.7 FL (ref 9.4–12.4)
POTASSIUM SERPL-SCNC: 4.5 MMOL/L (ref 3.5–5)
RBC # BLD: 4.8 M/UL (ref 4.7–6.1)
SODIUM BLD-SCNC: 138 MMOL/L (ref 136–145)
T4 FREE: 1.24 NG/DL (ref 0.93–1.7)
TOTAL PROTEIN: 6.5 G/DL (ref 6.6–8.7)
TRIGL SERPL-MCNC: 72 MG/DL (ref 0–149)
TSH SERPL DL<=0.05 MIU/L-ACNC: 3.13 UIU/ML (ref 0.27–4.2)
WBC # BLD: 5.1 K/UL (ref 4.8–10.8)

## 2022-07-27 ENCOUNTER — TELEPHONE (OUTPATIENT)
Dept: PRIMARY CARE CLINIC | Age: 72
End: 2022-07-27

## 2022-07-27 NOTE — TELEPHONE ENCOUNTER
----- Message from Simmery Paxico, DO sent at 7/27/2022 10:47 AM CDT -----  Thyroid values normal.  Lipids are controlled. Metabolic profile shows very mild renal insufficiency but otherwise unremarkable. I would not be overly concerned about this as it should not cause her problems as long as we control risk factors well. There is no significant protein excretion in the urine. Your WBC, (infection fighting ability) Hgb and Hct, (oxygen carrying cells) are normal; as is your percentage of each cell type.

## 2022-09-21 ENCOUNTER — OFFICE VISIT (OUTPATIENT)
Dept: PRIMARY CARE CLINIC | Age: 72
End: 2022-09-21
Payer: COMMERCIAL

## 2022-09-21 VITALS
HEART RATE: 51 BPM | SYSTOLIC BLOOD PRESSURE: 124 MMHG | WEIGHT: 216 LBS | HEIGHT: 72 IN | BODY MASS INDEX: 29.26 KG/M2 | TEMPERATURE: 97.3 F | OXYGEN SATURATION: 97 % | DIASTOLIC BLOOD PRESSURE: 82 MMHG

## 2022-09-21 DIAGNOSIS — Z11.59 ENCOUNTER FOR HEPATITIS C SCREENING TEST FOR LOW RISK PATIENT: ICD-10-CM

## 2022-09-21 DIAGNOSIS — R39.11 BENIGN PROSTATIC HYPERPLASIA WITH URINARY HESITANCY: ICD-10-CM

## 2022-09-21 DIAGNOSIS — N40.1 BENIGN PROSTATIC HYPERPLASIA WITH URINARY HESITANCY: ICD-10-CM

## 2022-09-21 DIAGNOSIS — N18.31 STAGE 3A CHRONIC KIDNEY DISEASE (HCC): ICD-10-CM

## 2022-09-21 DIAGNOSIS — R73.03 PREDIABETES: ICD-10-CM

## 2022-09-21 DIAGNOSIS — E78.2 MIXED HYPERLIPIDEMIA: ICD-10-CM

## 2022-09-21 DIAGNOSIS — R53.83 FATIGUE, UNSPECIFIED TYPE: ICD-10-CM

## 2022-09-21 DIAGNOSIS — I10 PRIMARY HYPERTENSION: Primary | ICD-10-CM

## 2022-09-21 DIAGNOSIS — N52.9 ED (ERECTILE DYSFUNCTION) OF ORGANIC ORIGIN: ICD-10-CM

## 2022-09-21 PROCEDURE — 3017F COLORECTAL CA SCREEN DOC REV: CPT | Performed by: PEDIATRICS

## 2022-09-21 PROCEDURE — 1123F ACP DISCUSS/DSCN MKR DOCD: CPT | Performed by: PEDIATRICS

## 2022-09-21 PROCEDURE — G8417 CALC BMI ABV UP PARAM F/U: HCPCS | Performed by: PEDIATRICS

## 2022-09-21 PROCEDURE — 1036F TOBACCO NON-USER: CPT | Performed by: PEDIATRICS

## 2022-09-21 PROCEDURE — 99214 OFFICE O/P EST MOD 30 MIN: CPT | Performed by: PEDIATRICS

## 2022-09-21 PROCEDURE — G8427 DOCREV CUR MEDS BY ELIG CLIN: HCPCS | Performed by: PEDIATRICS

## 2022-09-21 ASSESSMENT — ENCOUNTER SYMPTOMS
EYES NEGATIVE: 1
GASTROINTESTINAL NEGATIVE: 1
RESPIRATORY NEGATIVE: 1
ALLERGIC/IMMUNOLOGIC NEGATIVE: 1

## 2022-09-21 NOTE — PROGRESS NOTES
[address] [facility], [address]  Phone [phone]   Fax [phone]      OFFICE VISIT: 2022    Benjamin Rios-: 1950      HPI  Reason For Visit:  Ne Shaver is a 74 y.o.     6 Month Follow-Up (Increase in arthritis pain and stiffness in fingers. It used to come and go and now is more constant. ), Health Maintenance (Colon 6 years ago with Dr. Evan Samayoa. [place] [facility]), and Numbness (He is having puffiness and numbness on the soles on bilateral feet at night. )    Patient presents on follow-up for multiple health issues. Present concerns:  He is having a lot of increase in his arthritis pain. Is predominantly in his hands and feet. He notes swelling and stiffness with symptoms worse at night. Medications:   none  Symptoms: As above      Hypertension:   BP today was   BP Readings from Last 1 Encounters:   22 124/82      Recent BP readings:    BP Readings from Last 3 Encounters:   22 124/82   22 117/70   22 128/68     Medication   Lisinopril 20 mg daily   Flomax 0.4 mg nightly  Medication compliance:  compliant most of the time  Home blood pressure monitoring: Yes -well controlled when checked. He  is some  adherent to a low sodium diet. Symptoms: None  Laboratory:  Lab Results   Component Value Date    BUN 17 2022    CREATININE 1.3 (H) 2022       Hyperlipidemia:   Medication:   atorvastatin (Lipitor) 40 mg nightly  Low Fat, Low Choleterol Diet:  yes -he tries  Myalgias or GI upset: no  The patient exercises intermittently. Laboratory:    Lab Results   Component Value Date    CHOL 151 (L) 2022    TRIG 72 2022    HDL 60 2022    LDLCALC 77 2022      Lab Results   Component Value Date    ALT 12 2022    AST 22 2022       CKD 3a  He is on RAAS therapy. He is on aldosterone inhibition. He is not on any nephrotoxic drugs.   Most recent, (2022) BUN/creatinine/GFR = use: Not Currently        Review of Systems   Constitutional: Negative. HENT: Negative. Eyes: Negative. Respiratory: Negative. Cardiovascular: Negative. Gastrointestinal: Negative. Endocrine: Negative. Genitourinary: Negative. Musculoskeletal: Negative. Skin: Negative. Allergic/Immunologic: Negative. Neurological: Negative. Hematological: Negative. Psychiatric/Behavioral: Negative. Physical Exam  Vitals and nursing note reviewed. Constitutional:       General: He is not in acute distress. Appearance: Normal appearance. He is well-developed and normal weight. HENT:      Head: Normocephalic and atraumatic. Right Ear: Tympanic membrane, ear canal and external ear normal.      Left Ear: Tympanic membrane, ear canal and external ear normal.      Nose: Nose normal.      Mouth/Throat:      Mouth: Mucous membranes are moist.      Pharynx: Oropharynx is clear. No oropharyngeal exudate. Eyes:      General:         Right eye: No discharge. Left eye: No discharge. Extraocular Movements: Extraocular movements intact. Conjunctiva/sclera: Conjunctivae normal.      Pupils: Pupils are equal, round, and reactive to light. Neck:      Thyroid: No thyromegaly. Vascular: No JVD. Cardiovascular:      Rate and Rhythm: Normal rate and regular rhythm. Pulses: Normal pulses. Heart sounds: Normal heart sounds. No murmur heard. No friction rub. No gallop. Pulmonary:      Effort: Pulmonary effort is normal. No respiratory distress. Breath sounds: Normal breath sounds. No wheezing or rales. Abdominal:      General: Bowel sounds are normal. There is no distension. Palpations: Abdomen is soft. There is no mass. Tenderness: There is no abdominal tenderness. Musculoskeletal:         General: No swelling or tenderness. Normal range of motion. Cervical back: Normal range of motion and neck supple.    Lymphadenopathy: Cervical: No cervical adenopathy. Skin:     General: Skin is warm and dry. Capillary Refill: Capillary refill takes less than 2 seconds. Findings: No rash. Neurological:      General: No focal deficit present. Mental Status: He is alert and oriented to person, place, and time. Motor: No abnormal muscle tone. Psychiatric:         Mood and Affect: Mood normal.         Behavior: Behavior normal.           ASSESSMENT      ICD-10-CM    1. Primary hypertension  I10 CBC with Auto Differential     Comprehensive Metabolic Panel     Microalbumin / Creatinine Urine Ratio      2. Mixed hyperlipidemia  E78.2 Lipid Panel      3. Prediabetes  R73.03 Comprehensive Metabolic Panel     Hemoglobin A1C      4. Stage 3a chronic kidney disease (HCC)  N18.31 Microalbumin / Creatinine Urine Ratio      5. Benign prostatic hyperplasia with urinary hesitancy  N40.1     R39.11       6. ED (erectile dysfunction) of organic origin  N52.9 Testosterone Free and Total Male      7. Fatigue, unspecified type  R53.83 T4, Free     TSH      8. Encounter for hepatitis C screening test for low risk patient  Z11.59 Hepatitis C Antibody            PLAN    1. Primary hypertension  Blood pressure is appropriately controlled on present medication regimen. Continue the same  - CBC with Auto Differential; Future  - Comprehensive Metabolic Panel; Future  - Microalbumin / Creatinine Urine Ratio; Future    2. Mixed hyperlipidemia  Lipids are well controlled. Continue present medication management  - Lipid Panel; Future    3. Prediabetes  Follow hemoglobin A1c every 6 months  - Comprehensive Metabolic Panel; Future  - Hemoglobin A1C; Future    4. Stage 3a chronic kidney disease (HCC)  Mild stable disease. He is not on any nephrotoxic drugs. Continue to monitor serially  - Microalbumin / Creatinine Urine Ratio; Future    5. Benign prostatic hyperplasia with urinary hesitancy  Continue to monitor PSA and symptoms.   He is doing well with tamsulosin 0.4 mg daily. We did discuss that he can go as high as 0.8 mg if needed. He will let us know if he needs higher dose    6. ED (erectile dysfunction) of organic origin  This is effective in treating his symptoms.  - Testosterone Free and Total Male; Future    7. Fatigue, unspecified type  Check thyroid  - T4, Free; Future  - TSH; Future    8. Encounter for hepatitis C screening test for low risk patient  Screen hepatitis C  - Hepatitis C Antibody; Future    Orders Placed This Encounter   Procedures    CBC with Auto Differential    Comprehensive Metabolic Panel    Hemoglobin A1C    Lipid Panel    Microalbumin / Creatinine Urine Ratio    T4, Free    TSH    Hepatitis C Antibody    Testosterone Free and Total Male          Return in about 6 months (around 3/21/2023) for 30.        This was an in-house visit

## 2022-10-11 DIAGNOSIS — Z12.11 COLON CANCER SCREENING: Primary | ICD-10-CM

## 2022-10-11 NOTE — PROGRESS NOTES
Spoke to patient about cologuard screening. He stated the he would like one sent for him and his wife.

## 2022-10-30 LAB — NONINV COLON CA DNA+OCC BLD SCRN STL QL: NEGATIVE

## 2022-10-31 ENCOUNTER — TELEPHONE (OUTPATIENT)
Dept: PRIMARY CARE CLINIC | Age: 72
End: 2022-10-31

## 2022-10-31 NOTE — TELEPHONE ENCOUNTER
----- Message from PARVIZ Snell sent at 10/31/2022  8:48 AM CDT -----  Please call patient and let them know results.    Colon cancer screening negative repeat colon cancer screening in 3 years

## 2022-11-22 DIAGNOSIS — R79.89 LOW TESTOSTERONE IN MALE: ICD-10-CM

## 2022-11-22 NOTE — TELEPHONE ENCOUNTER
Received call/My Chart Message from patient requesting refill on medication(s). Pt was last seen in office on 9/21/2022  and has a follow up scheduled for 3/21/2023. Will send request to provider for authorization. Requested Prescriptions     Pending Prescriptions Disp Refills    Testosterone 20 % CREA [Pharmacy Med Name: TESTOSTERONE 150 MG CREAM (TESTOSTERONE  POWDER, HRT BOTANICAL  CREAM)] 30 each 3     Sig: Apply 1 Pump topically daily for 30 days.

## 2023-03-20 DIAGNOSIS — R73.03 PREDIABETES: ICD-10-CM

## 2023-03-20 DIAGNOSIS — E78.2 MIXED HYPERLIPIDEMIA: ICD-10-CM

## 2023-03-20 DIAGNOSIS — R53.83 FATIGUE, UNSPECIFIED TYPE: ICD-10-CM

## 2023-03-20 DIAGNOSIS — I10 PRIMARY HYPERTENSION: ICD-10-CM

## 2023-03-20 DIAGNOSIS — N18.31 STAGE 3A CHRONIC KIDNEY DISEASE (HCC): ICD-10-CM

## 2023-03-20 DIAGNOSIS — N52.9 ED (ERECTILE DYSFUNCTION) OF ORGANIC ORIGIN: ICD-10-CM

## 2023-03-20 DIAGNOSIS — Z11.59 ENCOUNTER FOR HEPATITIS C SCREENING TEST FOR LOW RISK PATIENT: ICD-10-CM

## 2023-03-20 LAB
ALBUMIN SERPL-MCNC: 4 G/DL (ref 3.5–5.2)
ALP SERPL-CCNC: 73 U/L (ref 40–130)
ALT SERPL-CCNC: 13 U/L (ref 5–41)
ANION GAP SERPL CALCULATED.3IONS-SCNC: 8 MMOL/L (ref 7–19)
AST SERPL-CCNC: 21 U/L (ref 5–40)
BASOPHILS # BLD: 0.1 K/UL (ref 0–0.2)
BASOPHILS NFR BLD: 1.1 % (ref 0–1)
BILIRUB SERPL-MCNC: 0.6 MG/DL (ref 0.2–1.2)
BUN SERPL-MCNC: 17 MG/DL (ref 8–23)
CALCIUM SERPL-MCNC: 9.1 MG/DL (ref 8.8–10.2)
CHLORIDE SERPL-SCNC: 104 MMOL/L (ref 98–111)
CHOLEST SERPL-MCNC: 127 MG/DL (ref 160–199)
CO2 SERPL-SCNC: 28 MMOL/L (ref 22–29)
CREAT SERPL-MCNC: 1.1 MG/DL (ref 0.5–1.2)
CREAT UR-MCNC: 157.3 MG/DL (ref 4.2–622)
EOSINOPHIL # BLD: 0.2 K/UL (ref 0–0.6)
EOSINOPHIL NFR BLD: 4.4 % (ref 0–5)
ERYTHROCYTE [DISTWIDTH] IN BLOOD BY AUTOMATED COUNT: 13.7 % (ref 11.5–14.5)
GLUCOSE SERPL-MCNC: 92 MG/DL (ref 74–109)
HBA1C MFR BLD: 5.9 % (ref 4–6)
HCT VFR BLD AUTO: 42.2 % (ref 42–52)
HCV AB SERPL QL IA: NORMAL
HDLC SERPL-MCNC: 60 MG/DL (ref 55–121)
HGB BLD-MCNC: 13.8 G/DL (ref 14–18)
IMM GRANULOCYTES # BLD: 0 K/UL
LDLC SERPL CALC-MCNC: 55 MG/DL
LYMPHOCYTES # BLD: 1.3 K/UL (ref 1.1–4.5)
LYMPHOCYTES NFR BLD: 27.9 % (ref 20–40)
MCH RBC QN AUTO: 29.6 PG (ref 27–31)
MCHC RBC AUTO-ENTMCNC: 32.7 G/DL (ref 33–37)
MCV RBC AUTO: 90.6 FL (ref 80–94)
MICROALBUMIN UR-MCNC: <1.2 MG/DL (ref 0–19)
MICROALBUMIN/CREAT UR-RTO: NORMAL MG/G
MONOCYTES # BLD: 0.4 K/UL (ref 0–0.9)
MONOCYTES NFR BLD: 8.7 % (ref 0–10)
NEUTROPHILS # BLD: 2.6 K/UL (ref 1.5–7.5)
NEUTS SEG NFR BLD: 57.7 % (ref 50–65)
PLATELET # BLD AUTO: 162 K/UL (ref 130–400)
PMV BLD AUTO: 11.7 FL (ref 9.4–12.4)
POTASSIUM SERPL-SCNC: 4.5 MMOL/L (ref 3.5–5)
PROT SERPL-MCNC: 6.6 G/DL (ref 6.6–8.7)
RBC # BLD AUTO: 4.66 M/UL (ref 4.7–6.1)
SODIUM SERPL-SCNC: 140 MMOL/L (ref 136–145)
T4 FREE SERPL-MCNC: 1.48 NG/DL (ref 0.93–1.7)
TRIGL SERPL-MCNC: 62 MG/DL (ref 0–149)
TSH SERPL DL<=0.005 MIU/L-ACNC: 2.73 UIU/ML (ref 0.27–4.2)
WBC # BLD AUTO: 4.6 K/UL (ref 4.8–10.8)

## 2023-03-21 ENCOUNTER — OFFICE VISIT (OUTPATIENT)
Dept: FAMILY MEDICINE CLINIC | Age: 73
End: 2023-03-21
Payer: COMMERCIAL

## 2023-03-21 VITALS
SYSTOLIC BLOOD PRESSURE: 122 MMHG | BODY MASS INDEX: 29.26 KG/M2 | WEIGHT: 216 LBS | DIASTOLIC BLOOD PRESSURE: 80 MMHG | HEART RATE: 41 BPM | HEIGHT: 72 IN | OXYGEN SATURATION: 98 % | TEMPERATURE: 97 F

## 2023-03-21 DIAGNOSIS — E78.2 MIXED HYPERLIPIDEMIA: ICD-10-CM

## 2023-03-21 DIAGNOSIS — R39.11 BENIGN PROSTATIC HYPERPLASIA WITH URINARY HESITANCY: ICD-10-CM

## 2023-03-21 DIAGNOSIS — R73.03 PREDIABETES: Primary | ICD-10-CM

## 2023-03-21 DIAGNOSIS — N40.1 BENIGN PROSTATIC HYPERPLASIA WITH URINARY HESITANCY: ICD-10-CM

## 2023-03-21 DIAGNOSIS — N52.9 ED (ERECTILE DYSFUNCTION) OF ORGANIC ORIGIN: ICD-10-CM

## 2023-03-21 DIAGNOSIS — I10 PRIMARY HYPERTENSION: ICD-10-CM

## 2023-03-21 PROCEDURE — 3074F SYST BP LT 130 MM HG: CPT | Performed by: PEDIATRICS

## 2023-03-21 PROCEDURE — 1036F TOBACCO NON-USER: CPT | Performed by: PEDIATRICS

## 2023-03-21 PROCEDURE — 3079F DIAST BP 80-89 MM HG: CPT | Performed by: PEDIATRICS

## 2023-03-21 PROCEDURE — G8484 FLU IMMUNIZE NO ADMIN: HCPCS | Performed by: PEDIATRICS

## 2023-03-21 PROCEDURE — 3017F COLORECTAL CA SCREEN DOC REV: CPT | Performed by: PEDIATRICS

## 2023-03-21 PROCEDURE — G8427 DOCREV CUR MEDS BY ELIG CLIN: HCPCS | Performed by: PEDIATRICS

## 2023-03-21 PROCEDURE — 99214 OFFICE O/P EST MOD 30 MIN: CPT | Performed by: PEDIATRICS

## 2023-03-21 PROCEDURE — 1123F ACP DISCUSS/DSCN MKR DOCD: CPT | Performed by: PEDIATRICS

## 2023-03-21 PROCEDURE — G8417 CALC BMI ABV UP PARAM F/U: HCPCS | Performed by: PEDIATRICS

## 2023-03-21 RX ORDER — ATORVASTATIN CALCIUM 40 MG/1
40 TABLET, FILM COATED ORAL DAILY
Qty: 90 TABLET | Refills: 3 | Status: SHIPPED | OUTPATIENT
Start: 2023-03-21

## 2023-03-21 RX ORDER — TAMSULOSIN HYDROCHLORIDE 0.4 MG/1
0.4 CAPSULE ORAL NIGHTLY
Qty: 90 CAPSULE | Refills: 3 | Status: SHIPPED | OUTPATIENT
Start: 2023-03-21

## 2023-03-21 RX ORDER — LISINOPRIL 20 MG/1
20 TABLET ORAL DAILY
Qty: 90 TABLET | Refills: 3 | Status: SHIPPED | OUTPATIENT
Start: 2023-03-21

## 2023-03-21 RX ORDER — SILDENAFIL 100 MG/1
100 TABLET, FILM COATED ORAL DAILY PRN
Qty: 10 TABLET | Refills: 5 | Status: SHIPPED | OUTPATIENT
Start: 2023-03-21 | End: 2023-03-24 | Stop reason: SDUPTHER

## 2023-03-21 ASSESSMENT — PATIENT HEALTH QUESTIONNAIRE - PHQ9
SUM OF ALL RESPONSES TO PHQ QUESTIONS 1-9: 0
SUM OF ALL RESPONSES TO PHQ QUESTIONS 1-9: 0
SUM OF ALL RESPONSES TO PHQ9 QUESTIONS 1 & 2: 0
1. LITTLE INTEREST OR PLEASURE IN DOING THINGS: 0
SUM OF ALL RESPONSES TO PHQ QUESTIONS 1-9: 0
2. FEELING DOWN, DEPRESSED OR HOPELESS: 0
SUM OF ALL RESPONSES TO PHQ QUESTIONS 1-9: 0

## 2023-03-21 ASSESSMENT — ENCOUNTER SYMPTOMS
GASTROINTESTINAL NEGATIVE: 1
RESPIRATORY NEGATIVE: 1
EYES NEGATIVE: 1
ALLERGIC/IMMUNOLOGIC NEGATIVE: 1

## 2023-03-21 NOTE — PROGRESS NOTES
Tobacco Use    Smoking status: Never    Smokeless tobacco: Never   Substance Use Topics    Alcohol use: Not Currently        Review of Systems   Constitutional: Negative. HENT: Negative. Eyes: Negative. Respiratory: Negative. Cardiovascular: Negative. Gastrointestinal: Negative. Endocrine: Negative. Genitourinary: Negative. Musculoskeletal: Negative. Skin: Negative. Allergic/Immunologic: Negative. Neurological: Negative. Hematological: Negative. Psychiatric/Behavioral: Negative. Physical Exam  Vitals and nursing note reviewed. Constitutional:       General: He is not in acute distress. Appearance: Normal appearance. He is well-developed and normal weight. HENT:      Head: Normocephalic and atraumatic. Right Ear: Tympanic membrane, ear canal and external ear normal.      Left Ear: Tympanic membrane, ear canal and external ear normal.      Nose: Nose normal.      Mouth/Throat:      Mouth: Mucous membranes are moist.      Pharynx: Oropharynx is clear. No oropharyngeal exudate. Eyes:      General:         Right eye: No discharge. Left eye: No discharge. Extraocular Movements: Extraocular movements intact. Conjunctiva/sclera: Conjunctivae normal.      Pupils: Pupils are equal, round, and reactive to light. Neck:      Thyroid: No thyromegaly. Vascular: No JVD. Cardiovascular:      Rate and Rhythm: Normal rate and regular rhythm. Pulses: Normal pulses. Heart sounds: Normal heart sounds. No murmur heard. No friction rub. No gallop. Pulmonary:      Effort: Pulmonary effort is normal. No respiratory distress. Breath sounds: Normal breath sounds. No wheezing or rales. Abdominal:      General: Bowel sounds are normal. There is no distension. Palpations: Abdomen is soft. There is no mass. Tenderness: There is no abdominal tenderness. Musculoskeletal:         General: No swelling or tenderness.  Normal

## 2023-03-22 ENCOUNTER — TELEPHONE (OUTPATIENT)
Dept: FAMILY MEDICINE CLINIC | Age: 73
End: 2023-03-22

## 2023-03-22 NOTE — TELEPHONE ENCOUNTER
----- Message from [address] [facility],[address], DO sent at 3/20/2023  7:18 PM CDT -----  Your WBC, (infection fighting ability) Hgb and Hct, (oxygen carrying cells) are normal; as is your percentage of each cell type. Lipids are excellently controlled. Hepatitis C is nonreactive, meaning normal  Hemoglobin A1c is 5.9 which indicates prediabetes (increased risk of developing diabetes in the future)  Your metabolic profile is normal.  This includes kidney and liver functions as well as electrolytes. Thyroid values are normal.  There is no significant protein excretion in urine.   Testosterone levels are still pending

## 2023-03-23 NOTE — TELEPHONE ENCOUNTER
Called patient, spoke with: Patient regarding the results of the patients most recent Labs. I advised Patient of Dr. Elma Pérez recommendations.    Patient did voice understanding

## 2023-03-24 DIAGNOSIS — N52.9 ED (ERECTILE DYSFUNCTION) OF ORGANIC ORIGIN: ICD-10-CM

## 2023-03-24 LAB
SHBG SERPL-SCNC: 40 NMOL/L (ref 11–80)
SHBG SERPL-SCNC: 63.5 PG/ML (ref 47–244)
TESTOST SERPL-MCNC: 357 NG/DL (ref 220–1000)

## 2023-03-24 RX ORDER — SILDENAFIL 100 MG/1
100 TABLET, FILM COATED ORAL DAILY PRN
Qty: 10 TABLET | Refills: 5 | Status: SHIPPED | OUTPATIENT
Start: 2023-03-24

## 2023-03-24 NOTE — TELEPHONE ENCOUNTER
Called patient, spoke with: Patient regarding the results of the patients most recent Labs. I advised Patient of Dr. Russ Hess recommendations.    Patient did voice understanding     Pt sildeafil could not be filled by [facility] pt request this get sent to [address] [place] [address] Street

## 2023-05-08 ENCOUNTER — TELEPHONE (OUTPATIENT)
Dept: UROLOGY | Facility: CLINIC | Age: 73
End: 2023-05-08
Payer: MEDICARE

## 2023-05-08 NOTE — TELEPHONE ENCOUNTER
Caller: JADE SMITH     Best call back number: [phone]    What orders are you requesting (i.e. lab or imaging): PSA     PT WANTS PSA ORDER SENT TO [facility].

## 2023-06-12 NOTE — PROGRESS NOTES
Subjective    Mr. Harvey is 72 y.o. male    Chief Complaint: elevated PSA/BPH    History of Present Illness  Benign Prostatic Hypertrophy  Patient complains of lower urinary tract symptoms. He reports intermittency, nocturia two times a night, and weak stream. He denies frequency, incomplete emptying, straining, and urgency. Patient states symptoms are of moderate severity. Onset of symptoms was a few years ago and was gradual in onset. His AUA Symptom Score is, 9/35.He reports a history of no complicating symptoms. He denies flank pain, gross hematuria, kidney stones, and recurrent UTI.  Patient has tried Alpha blockers with improvement. Last PSA was 2.4 done 06/09/2023.  Previously last year it was 3.0.  Has had a history of MRI fusion biopsy in the past which was negative for a PSA over 6..      The following portions of the patient's history were reviewed and updated as appropriate: allergies, current medications, past family history, past medical history, past social history, past surgical history and problem list.    Review of Systems   Constitutional:  Negative for chills and fever.   Gastrointestinal:  Negative for abdominal pain, anal bleeding and blood in stool.   Genitourinary:  Negative for dysuria and hematuria.       Current Outpatient Medications:     atorvastatin (LIPITOR) 40 MG tablet, Take 1 tablet by mouth Daily., Disp: , Rfl:     BIOTIN 5000 PO, Take 1 tablet by mouth Daily., Disp: , Rfl:     lisinopril (PRINIVIL,ZESTRIL) 20 MG tablet, Take 1 tablet by mouth Daily., Disp: , Rfl:     LUTEIN PO, Take 20 mg by mouth Every Other Day., Disp: , Rfl:     Misc Natural Products (PROSTATE HEALTH PO), Take 1 tablet by mouth Daily., Disp: , Rfl:     tamsulosin (FLOMAX) 0.4 MG capsule 24 hr capsule, Take 1 capsule by mouth Every Night., Disp: , Rfl:     Testosterone 20 % cream, 150 mg., Disp: , Rfl:     sildenafil (REVATIO) 20 MG tablet, Take 20 mg by mouth As Needed. (Patient not taking: Reported on  "6/15/2023), Disp: , Rfl:     Current Facility-Administered Medications:     cefTRIAXone (ROCEPHIN) 1 g/100 mL 0.9% NS (MBP), 1 g, Intravenous, 30 Min Pre-Op, Shankar Wang MD    Past Medical History:   Diagnosis Date    Arthritis     Cervical disc disease     Chronic neck pain     Elevated PSA     Enlarged prostate     Hyperlipidemia     Hypertension        Past Surgical History:   Procedure Laterality Date    BACK SURGERY      FEMUR IM NAILING/RODDING Left 5/21/2018    Procedure: FEMUR INTRAMEDULLARY NAILING/RODDING;  Surgeon: Hilario Owen MD;  Location:  [facility] OR;  Service: Orthopedics    FRACTURE SURGERY      left thigh    PROSTATE BIOPSY      PROSTATE BIOPSY N/A 6/10/2019    Procedure: PROSTATE ULTRASOUND BIOPSY MRI FUSION WITH URONAV;  Surgeon: Shankar Wang MD;  Location:  [facility];  Service: Urology       Social History     Socioeconomic History    Marital status:    Tobacco Use    Smoking status: Never    Smokeless tobacco: Never   Vaping Use    Vaping Use: Never used   Substance and Sexual Activity    Alcohol use: Yes     Alcohol/week: 1.0 standard drink     Types: 1 Cans of beer per week     Comment: only occasionally    Drug use: No    Sexual activity: Yes     Partners: Female     Birth control/protection: None       Family History   Problem Relation Age of Onset    Cancer Mother     Heart disease Father     Hyperlipidemia Father     Hypertension Father        Objective    Temp 97.2 °F (36.2 °C)   Ht 182.9 cm (72\")   Wt 96.1 kg (211 lb 12.8 oz)   BMI 28.73 kg/m²     Physical Exam  Vitals reviewed.   Constitutional:       Appearance: Normal appearance.   HENT:      Head: Normocephalic and atraumatic.   Pulmonary:      Effort: Pulmonary effort is normal.   Genitourinary:     Comments: His digital rectal exam revealed a smooth symmetric prostate no suspicious lesions nodules asymmetry or firmness were palpated and it was mildly enlarged.  Skin:     General: Skin is warm and " dry.   Neurological:      Mental Status: He is alert and oriented to person, place, and time.   Psychiatric:         Mood and Affect: Mood normal.         Behavior: Behavior normal.           Results for orders placed or performed in visit on 06/15/23   POC Urinalysis Dipstick, Multipro    Specimen: Urine   Result Value Ref Range    Color Yellow Yellow, Straw, Dark Yellow, Teresa    Clarity, UA Clear Clear    Glucose, UA Negative Negative mg/dL    Bilirubin Small (1+) (A) Negative    Ketones, UA Negative Negative    Specific Gravity  1.030 1.005 - 1.030    Blood, UA Negative Negative    pH, Urine 5.5 5.0 - 8.0    Protein, POC Negative Negative mg/dL    Urobilinogen, UA 0.2 E.U./dL Normal, 0.2 E.U./dL    Nitrite, UA Negative Negative    Leukocytes Negative Negative   IPSS Questionnaire (AUA-7):  Incomplete emptying  Over the past month, how often have you had a sensation of not emptying your bladder completely after you finish?: Not at all (06/15/23 1001)  Frequency  Over the past month, how often have you had to urinate again less than two hours after you finishing urinating ?: Not at all (06/15/23 1001)  Intermittency  Over the past month, how often have you found you stopped and started again several time when you urinated ?: About half the time (06/15/23 1001)  Urgency  Over the last month, how difficult  have you found it to postpone urination ?: Not at all (06/15/23 1001)  Weak Stream  Over the past month, how often have you had a weak urinary stream ?: More than half the time (06/15/23 1001)  Straining  Over the past month, how often have you had to push or strain to begin urination ?: Less than 1 time 5 (06/15/23 1001)  Nocturia  Over the past month, how many times did you most typically get up to urinate from the time you went to bed until the time you got up in the morning ?: Less than 1 time in 5 (06/15/23 1001)  Quality of life due to urinary symptoms  If you were to spend the rest of your life with your  urinary condition the way it is now, how would feel about that?: Pleased (06/15/23 1001)    Scores  Total IPSS Score: 9 (06/15/23 1001)  Total Score = Symtomatic Level: moderately symptomatic: 8-19 (06/15/23 1001)     Assessment and Plan    Diagnoses and all orders for this visit:    1. Elevated PSA (Primary)  -     POC Urinalysis Dipstick, Multipro  -     PSA DIAGNOSTIC; Future    2. BPH with obstruction/lower urinary tract symptoms  -     PSA DIAGNOSTIC; Future    Since starting tamsulosin at his last visit patient states the tamsulosin is effective in treating his urinary symptoms and he is happy with the response and wants to continue on the tamsulosin.  Should he have any worsening symptoms I explained that he would need to be scheduled for cystoscopy to evaluate for amount of obstruction to see whether he would benefit from any surgical procedure.  PSA is also decreased from previous it was 2.4 done 06/09/2023.  Previously it was 3.0.  Follow-up 1 year PSA prior.

## 2023-06-15 ENCOUNTER — OFFICE VISIT (OUTPATIENT)
Dept: UROLOGY | Facility: CLINIC | Age: 73
End: 2023-06-15
Payer: MEDICARE

## 2023-06-15 VITALS — TEMPERATURE: 97.2 F | BODY MASS INDEX: 28.69 KG/M2 | WEIGHT: 211.8 LBS | HEIGHT: 72 IN

## 2023-06-15 DIAGNOSIS — N40.1 BPH WITH OBSTRUCTION/LOWER URINARY TRACT SYMPTOMS: ICD-10-CM

## 2023-06-15 DIAGNOSIS — R97.20 ELEVATED PSA: Primary | ICD-10-CM

## 2023-06-15 DIAGNOSIS — N13.8 BPH WITH OBSTRUCTION/LOWER URINARY TRACT SYMPTOMS: ICD-10-CM

## 2023-06-15 PROBLEM — H90.3 ASYMMETRICAL SENSORINEURAL HEARING LOSS: Status: ACTIVE | Noted: 2023-06-15

## 2023-06-15 LAB
BILIRUB BLD-MCNC: ABNORMAL MG/DL
CLARITY, POC: CLEAR
COLOR UR: YELLOW
GLUCOSE UR STRIP-MCNC: NEGATIVE MG/DL
KETONES UR QL: NEGATIVE
LEUKOCYTE EST, POC: NEGATIVE
NITRITE UR-MCNC: NEGATIVE MG/ML
PH UR: 5.5 [PH] (ref 5–8)
PROT UR STRIP-MCNC: NEGATIVE MG/DL
RBC # UR STRIP: NEGATIVE /UL
SP GR UR: 1.03 (ref 1–1.03)
UROBILINOGEN UR QL: ABNORMAL

## 2023-07-21 ENCOUNTER — OFFICE VISIT (OUTPATIENT)
Dept: ENT CLINIC | Age: 73
End: 2023-07-21
Payer: COMMERCIAL

## 2023-07-21 VITALS
HEIGHT: 72 IN | DIASTOLIC BLOOD PRESSURE: 82 MMHG | WEIGHT: 210 LBS | BODY MASS INDEX: 28.44 KG/M2 | SYSTOLIC BLOOD PRESSURE: 134 MMHG

## 2023-07-21 DIAGNOSIS — H90.3 ASYMMETRICAL SENSORINEURAL HEARING LOSS: Primary | ICD-10-CM

## 2023-07-21 PROCEDURE — 3017F COLORECTAL CA SCREEN DOC REV: CPT | Performed by: PHYSICIAN ASSISTANT

## 2023-07-21 PROCEDURE — G8417 CALC BMI ABV UP PARAM F/U: HCPCS | Performed by: PHYSICIAN ASSISTANT

## 2023-07-21 PROCEDURE — 99213 OFFICE O/P EST LOW 20 MIN: CPT | Performed by: PHYSICIAN ASSISTANT

## 2023-07-21 PROCEDURE — 1123F ACP DISCUSS/DSCN MKR DOCD: CPT | Performed by: PHYSICIAN ASSISTANT

## 2023-07-21 PROCEDURE — 3079F DIAST BP 80-89 MM HG: CPT | Performed by: PHYSICIAN ASSISTANT

## 2023-07-21 PROCEDURE — G8427 DOCREV CUR MEDS BY ELIG CLIN: HCPCS | Performed by: PHYSICIAN ASSISTANT

## 2023-07-21 PROCEDURE — 3075F SYST BP GE 130 - 139MM HG: CPT | Performed by: PHYSICIAN ASSISTANT

## 2023-07-21 PROCEDURE — 1036F TOBACCO NON-USER: CPT | Performed by: PHYSICIAN ASSISTANT

## 2023-07-21 NOTE — PROGRESS NOTES
Mr. Claudetta Noss is a pleasant 77-year-old male presents for 1 month follow-up after sudden onset of asymmetrical sensorineural hearing loss. Patient reports he has completed the prednisone therapy and has noticed no changes in his hearing. He denies any additional problems such as vision changes or vertigo. He was scheduled for MRI of the IAC however due to the schedule being full at [facility], this is not scheduled till the end of August.    Physical examination revealed the patient to have normal-appearing TMs bilaterally with microscopic exam.  The rest of exam was noted be essentially unremarkable and unchanged from last visit. Impression: Persistent asymmetrical sensorineural hearing loss right ear with unsuccessful prednisone trial    Plan: We discussed the options of steroidal injection into the TM or trying a refill of the oral prednisone. At this point he prefers not to do any further prednisone therapy. I have recommended moving his MRI to [address] [place] [address] for a sooner appointment. I will call him the results with further recommendations to follow. If the MRI is negative, would recommend a repeat hearing aid adjustment. Patient was reminded to call if he has any questions or problems.       Electronically signed by Sharmin Elkins PA-C on 7/21/23 at 10:24 AM CDT

## 2023-08-11 ENCOUNTER — TELEPHONE (OUTPATIENT)
Dept: ENT CLINIC | Age: 73
End: 2023-08-11

## 2023-08-11 NOTE — TELEPHONE ENCOUNTER
I called the patient the results of his MRI of the IAC after personal review. Patient was noted with no evidence of an acoustic neuroma to evaluation. I did not appreciate any significant sinus disease. Due to his asymmetrical hearing loss, I recommended the patient to do a hearing aid trial with adjustments to the affected ear. Advised him that if the hearing aid trial does not work for his satisfaction, could consider a cochlear implant or Baha implant. Patient was reminded to call if he has any questions or problems.       Electronically signed by Fang Coughlin PA-C on 8/11/23 at 3:44 PM CDT

## 2023-10-03 ENCOUNTER — OFFICE VISIT (OUTPATIENT)
Dept: PRIMARY CARE CLINIC | Age: 73
End: 2023-10-03
Payer: COMMERCIAL

## 2023-10-03 VITALS
HEART RATE: 60 BPM | BODY MASS INDEX: 28.48 KG/M2 | OXYGEN SATURATION: 97 % | SYSTOLIC BLOOD PRESSURE: 130 MMHG | RESPIRATION RATE: 16 BRPM | DIASTOLIC BLOOD PRESSURE: 70 MMHG | WEIGHT: 210 LBS | TEMPERATURE: 97.8 F

## 2023-10-03 DIAGNOSIS — J06.9 VIRAL URI: Primary | ICD-10-CM

## 2023-10-03 DIAGNOSIS — R52 GENERALIZED BODY ACHES: ICD-10-CM

## 2023-10-03 LAB
INFLUENZA A ANTIBODY: NORMAL
INFLUENZA B ANTIBODY: NORMAL
S PYO AG THROAT QL: NORMAL

## 2023-10-03 PROCEDURE — 3075F SYST BP GE 130 - 139MM HG: CPT | Performed by: NURSE PRACTITIONER

## 2023-10-03 PROCEDURE — 1036F TOBACCO NON-USER: CPT | Performed by: NURSE PRACTITIONER

## 2023-10-03 PROCEDURE — 3078F DIAST BP <80 MM HG: CPT | Performed by: NURSE PRACTITIONER

## 2023-10-03 PROCEDURE — G8417 CALC BMI ABV UP PARAM F/U: HCPCS | Performed by: NURSE PRACTITIONER

## 2023-10-03 PROCEDURE — 3017F COLORECTAL CA SCREEN DOC REV: CPT | Performed by: NURSE PRACTITIONER

## 2023-10-03 PROCEDURE — 1123F ACP DISCUSS/DSCN MKR DOCD: CPT | Performed by: NURSE PRACTITIONER

## 2023-10-03 PROCEDURE — G8428 CUR MEDS NOT DOCUMENT: HCPCS | Performed by: NURSE PRACTITIONER

## 2023-10-03 PROCEDURE — G8484 FLU IMMUNIZE NO ADMIN: HCPCS | Performed by: NURSE PRACTITIONER

## 2023-10-03 PROCEDURE — 99203 OFFICE O/P NEW LOW 30 MIN: CPT | Performed by: NURSE PRACTITIONER

## 2023-10-03 RX ORDER — CIPROFLOXACIN HYDROCHLORIDE 3.5 MG/ML
1 SOLUTION/ DROPS TOPICAL
COMMUNITY

## 2023-10-03 RX ORDER — AMOXICILLIN 500 MG/1
500 CAPSULE ORAL 3 TIMES DAILY
COMMUNITY

## 2023-10-03 ASSESSMENT — ENCOUNTER SYMPTOMS
SHORTNESS OF BREATH: 0
SORE THROAT: 1
EYE DISCHARGE: 0
COUGH: 0
VOMITING: 0
BLOOD IN STOOL: 0
RHINORRHEA: 0
SINUS PRESSURE: 0
COLOR CHANGE: 0
CONSTIPATION: 0
ABDOMINAL PAIN: 0
NAUSEA: 0
DIARRHEA: 0
EYE ITCHING: 0
WHEEZING: 0

## 2023-10-03 NOTE — PROGRESS NOTES
[facility] [address] [address]  Dept: [phone]  Dept Fax: [phone]  Loc: [phone]    Anny Fonseca is a 74 y.o. male who presents today for his medical conditions/complaints as noted below. Anny Fonseca is complaining of No chief complaint on file. HPI:   Generalized Body Aches  This is a new problem. The current episode started today. The problem occurs intermittently. The problem has been waxing and waning. Associated symptoms include chills, congestion, myalgias and a sore throat (scratchy). Pertinent negatives include no abdominal pain, chest pain, coughing, fatigue, fever, headaches, nausea, rash or vomiting. Nothing aggravates the symptoms. He has tried nothing for the symptoms.        Past Medical History:   Diagnosis Date    Hyperlipidemia     Hypertension        Past Surgical History:   Procedure Laterality Date    LEG SURGERY Left     PROSTATE BIOPSY         Family History   Problem Relation Age of Onset    Cancer Mother     Heart Disease Father        Social History     Tobacco Use    Smoking status: Never    Smokeless tobacco: Never   Substance Use Topics    Alcohol use: Not Currently        Current Outpatient Medications   Medication Sig Dispense Refill    amoxicillin (AMOXIL) 500 MG capsule Take 1 capsule by mouth 3 times daily      ciprofloxacin (CILOXAN) 0.3 % ophthalmic solution 1 drop every 2 hours      predniSONE (DELTASONE) 10 MG tablet Take 3 tabs po bid x 6 days, then decrease to 2 tabs po bid x 4 days, then decrease 1 tab po q day for 4 days 64 tablet 0    sildenafil (VIAGRA) 100 MG tablet Take 1 tablet by mouth daily as needed for Erectile Dysfunction 10 tablet 5    lisinopril (PRINIVIL;ZESTRIL) 20 MG tablet Take 1 tablet by mouth daily 90 tablet 3    atorvastatin (LIPITOR) 40 MG tablet Take 1 tablet by mouth daily 90 tablet 3    tamsulosin (FLOMAX) 0.4 MG capsule Take 1 capsule by

## 2023-10-09 DIAGNOSIS — N52.9 ED (ERECTILE DYSFUNCTION) OF ORGANIC ORIGIN: ICD-10-CM

## 2023-10-09 NOTE — TELEPHONE ENCOUNTER
Received fax from pharmacy requesting refill on pts medication(s). Pt was last seen in office on 3/21/2023  and has a follow up scheduled for 3/25/2024. Will send request to  Dr. Rodo Parker  for authorization. Requested Prescriptions     Pending Prescriptions Disp Refills    Testosterone Compounding Kit 20 % CREA [Pharmacy Med Name: TESTOSTERONE 150 MG CREAM (TESTOSTERONE  POWDER, HRT BOTANICAL  CREAM)] 30 g 5     Sig: Place 150 mg onto the skin at bedtime. Place 1 actuation onto the skin at bedtime Testosterone 150mg 30gms- [address] [facility].  - TransDERmal Max Daily Amount: 150

## 2023-10-11 ENCOUNTER — APPOINTMENT (OUTPATIENT)
Dept: CT IMAGING | Facility: HOSPITAL | Age: 73
End: 2023-10-11
Payer: MEDICARE

## 2023-10-11 ENCOUNTER — PROCEDURE VISIT (OUTPATIENT)
Dept: UROLOGY | Facility: CLINIC | Age: 73
End: 2023-10-11
Payer: MEDICARE

## 2023-10-11 ENCOUNTER — APPOINTMENT (OUTPATIENT)
Dept: GENERAL RADIOLOGY | Facility: HOSPITAL | Age: 73
End: 2023-10-11
Payer: MEDICARE

## 2023-10-11 ENCOUNTER — TELEPHONE (OUTPATIENT)
Dept: UROLOGY | Facility: CLINIC | Age: 73
End: 2023-10-11
Payer: MEDICARE

## 2023-10-11 ENCOUNTER — HOSPITAL ENCOUNTER (EMERGENCY)
Facility: HOSPITAL | Age: 73
Discharge: HOME | End: 2023-10-11
Attending: STUDENT IN AN ORGANIZED HEALTH CARE EDUCATION/TRAINING PROGRAM | Admitting: STUDENT IN AN ORGANIZED HEALTH CARE EDUCATION/TRAINING PROGRAM
Payer: MEDICARE

## 2023-10-11 VITALS
DIASTOLIC BLOOD PRESSURE: 75 MMHG | BODY MASS INDEX: 28.17 KG/M2 | HEART RATE: 65 BPM | SYSTOLIC BLOOD PRESSURE: 141 MMHG | HEIGHT: 72 IN | TEMPERATURE: 98.2 F | OXYGEN SATURATION: 96 % | RESPIRATION RATE: 17 BRPM | WEIGHT: 208 LBS

## 2023-10-11 VITALS — DIASTOLIC BLOOD PRESSURE: 63 MMHG | HEART RATE: 77 BPM | OXYGEN SATURATION: 98 % | SYSTOLIC BLOOD PRESSURE: 100 MMHG

## 2023-10-11 DIAGNOSIS — R59.1 LYMPHADENOPATHY: ICD-10-CM

## 2023-10-11 DIAGNOSIS — N40.1 BPH WITH OBSTRUCTION/LOWER URINARY TRACT SYMPTOMS: Primary | ICD-10-CM

## 2023-10-11 DIAGNOSIS — N30.90 CYSTITIS: Primary | ICD-10-CM

## 2023-10-11 DIAGNOSIS — N40.0 ENLARGED PROSTATE: ICD-10-CM

## 2023-10-11 DIAGNOSIS — N13.8 BPH WITH OBSTRUCTION/LOWER URINARY TRACT SYMPTOMS: Primary | ICD-10-CM

## 2023-10-11 LAB
ANION GAP SERPL CALCULATED.3IONS-SCNC: 12 MMOL/L (ref 5–15)
BACTERIA UR QL AUTO: ABNORMAL /HPF
BASOPHILS # BLD AUTO: 0.04 10*3/MM3 (ref 0–0.2)
BASOPHILS NFR BLD AUTO: 0.3 % (ref 0–1.5)
BILIRUB UR QL STRIP: ABNORMAL
BUN SERPL-MCNC: 21 MG/DL (ref 8–23)
BUN/CREAT SERPL: 13.7 (ref 7–25)
CALCIUM SPEC-SCNC: 9.2 MG/DL (ref 8.6–10.5)
CHLORIDE SERPL-SCNC: 100 MMOL/L (ref 98–107)
CLARITY UR: ABNORMAL
CO2 SERPL-SCNC: 23 MMOL/L (ref 22–29)
COLOR UR: ABNORMAL
CREAT SERPL-MCNC: 1.53 MG/DL (ref 0.76–1.27)
D-LACTATE SERPL-SCNC: 1.5 MMOL/L (ref 0.5–2)
DEPRECATED RDW RBC AUTO: 45 FL (ref 37–54)
EGFRCR SERPLBLD CKD-EPI 2021: 47.1 ML/MIN/1.73
EOSINOPHIL # BLD AUTO: 0.03 10*3/MM3 (ref 0–0.4)
EOSINOPHIL NFR BLD AUTO: 0.2 % (ref 0.3–6.2)
ERYTHROCYTE [DISTWIDTH] IN BLOOD BY AUTOMATED COUNT: 13.9 % (ref 12.3–15.4)
FLUAV RNA RESP QL NAA+PROBE: NOT DETECTED
FLUBV RNA RESP QL NAA+PROBE: NOT DETECTED
GLUCOSE SERPL-MCNC: 128 MG/DL (ref 65–99)
GLUCOSE UR STRIP-MCNC: ABNORMAL MG/DL
HCT VFR BLD AUTO: 40.2 % (ref 37.5–51)
HGB BLD-MCNC: 13.4 G/DL (ref 13–17.7)
HGB UR QL STRIP.AUTO: ABNORMAL
HYALINE CASTS UR QL AUTO: ABNORMAL /LPF
IMM GRANULOCYTES # BLD AUTO: 0.1 10*3/MM3 (ref 0–0.05)
IMM GRANULOCYTES NFR BLD AUTO: 0.8 % (ref 0–0.5)
KETONES UR QL STRIP: ABNORMAL
LEUKOCYTE ESTERASE UR QL STRIP.AUTO: ABNORMAL
LYMPHOCYTES # BLD AUTO: 0.81 10*3/MM3 (ref 0.7–3.1)
LYMPHOCYTES NFR BLD AUTO: 6.6 % (ref 19.6–45.3)
MCH RBC QN AUTO: 29.3 PG (ref 26.6–33)
MCHC RBC AUTO-ENTMCNC: 33.3 G/DL (ref 31.5–35.7)
MCV RBC AUTO: 88 FL (ref 79–97)
MONOCYTES # BLD AUTO: 0.61 10*3/MM3 (ref 0.1–0.9)
MONOCYTES NFR BLD AUTO: 4.9 % (ref 5–12)
NEUTROPHILS NFR BLD AUTO: 10.75 10*3/MM3 (ref 1.7–7)
NEUTROPHILS NFR BLD AUTO: 87.2 % (ref 42.7–76)
NITRITE UR QL STRIP: POSITIVE
NRBC BLD AUTO-RTO: 0 /100 WBC (ref 0–0.2)
PH UR STRIP.AUTO: <=5 [PH] (ref 5–8)
PLATELET # BLD AUTO: 161 10*3/MM3 (ref 140–450)
PMV BLD AUTO: 10.7 FL (ref 6–12)
POTASSIUM SERPL-SCNC: 3.4 MMOL/L (ref 3.5–5.2)
PROT UR QL STRIP: ABNORMAL
RBC # BLD AUTO: 4.57 10*6/MM3 (ref 4.14–5.8)
RBC # UR STRIP: ABNORMAL /HPF
REF LAB TEST METHOD: ABNORMAL
SARS-COV-2 RNA RESP QL NAA+PROBE: NOT DETECTED
SODIUM SERPL-SCNC: 135 MMOL/L (ref 136–145)
SP GR UR STRIP: 1.01 (ref 1–1.03)
SQUAMOUS #/AREA URNS HPF: ABNORMAL /HPF
TRANS CELLS #/AREA URNS HPF: ABNORMAL /HPF
UROBILINOGEN UR QL STRIP: ABNORMAL
WBC # UR STRIP: ABNORMAL /HPF
WBC NRBC COR # BLD: 12.34 10*3/MM3 (ref 3.4–10.8)

## 2023-10-11 PROCEDURE — 83605 ASSAY OF LACTIC ACID: CPT | Performed by: STUDENT IN AN ORGANIZED HEALTH CARE EDUCATION/TRAINING PROGRAM

## 2023-10-11 PROCEDURE — 81001 URINALYSIS AUTO W/SCOPE: CPT | Performed by: STUDENT IN AN ORGANIZED HEALTH CARE EDUCATION/TRAINING PROGRAM

## 2023-10-11 PROCEDURE — 71046 X-RAY EXAM CHEST 2 VIEWS: CPT

## 2023-10-11 PROCEDURE — 96365 THER/PROPH/DIAG IV INF INIT: CPT

## 2023-10-11 PROCEDURE — 25510000001 IOPAMIDOL 61 % SOLUTION: Performed by: STUDENT IN AN ORGANIZED HEALTH CARE EDUCATION/TRAINING PROGRAM

## 2023-10-11 PROCEDURE — 25010000002 CEFTRIAXONE PER 250 MG: Performed by: STUDENT IN AN ORGANIZED HEALTH CARE EDUCATION/TRAINING PROGRAM

## 2023-10-11 PROCEDURE — 80048 BASIC METABOLIC PNL TOTAL CA: CPT | Performed by: STUDENT IN AN ORGANIZED HEALTH CARE EDUCATION/TRAINING PROGRAM

## 2023-10-11 PROCEDURE — 87086 URINE CULTURE/COLONY COUNT: CPT | Performed by: STUDENT IN AN ORGANIZED HEALTH CARE EDUCATION/TRAINING PROGRAM

## 2023-10-11 PROCEDURE — 87040 BLOOD CULTURE FOR BACTERIA: CPT | Performed by: STUDENT IN AN ORGANIZED HEALTH CARE EDUCATION/TRAINING PROGRAM

## 2023-10-11 PROCEDURE — 74177 CT ABD & PELVIS W/CONTRAST: CPT

## 2023-10-11 PROCEDURE — 85025 COMPLETE CBC W/AUTO DIFF WBC: CPT | Performed by: STUDENT IN AN ORGANIZED HEALTH CARE EDUCATION/TRAINING PROGRAM

## 2023-10-11 PROCEDURE — 36415 COLL VENOUS BLD VENIPUNCTURE: CPT

## 2023-10-11 PROCEDURE — 87636 SARSCOV2 & INF A&B AMP PRB: CPT | Performed by: STUDENT IN AN ORGANIZED HEALTH CARE EDUCATION/TRAINING PROGRAM

## 2023-10-11 PROCEDURE — 99285 EMERGENCY DEPT VISIT HI MDM: CPT

## 2023-10-11 RX ORDER — CEFDINIR 300 MG/1
300 CAPSULE ORAL 2 TIMES DAILY
Qty: 14 CAPSULE | Refills: 0 | Status: SHIPPED | OUTPATIENT
Start: 2023-10-11 | End: 2023-10-18

## 2023-10-11 RX ADMIN — SODIUM CHLORIDE 1000 MG: 900 INJECTION INTRAVENOUS at 16:15

## 2023-10-11 RX ADMIN — IOPAMIDOL 100 ML: 612 INJECTION, SOLUTION INTRAVENOUS at 15:04

## 2023-10-11 NOTE — PROGRESS NOTES
Patient came in with c/o burning with urination and difficulty starting stream, when patient arrived he was SOB and weak. Patient was bladder scanned with a residual of 101 ml and normal v/s were obtained. Patient started Omnicef on Sunday and received a call during visit that there was no bacterial growth on his culture that they did at [facility] in [place]. Patient stated that he has started having cold sore like spot inside of his mouth since starting the antibiotic. Asked Pio to assess the patient and he wanted to call 911 for ambulance but patient declined and are going to the ER to be evaluated. Spoke with Lizy Cerda about the situation who spoke with Bethany Simpson; asked patient to go straight to the ER and called the ER to let them know patient would be on their way.

## 2023-10-11 NOTE — DISCHARGE INSTRUCTIONS
It was very nice to meet you, Michele. Thank you for allowing us to take care of you today at [facility].    Your evaluation today did not show any emergent findings or have any emergent indications for admission to the hospital.  Like we discussed please use the antibiotics for further improvement.  Please follow-up with the urologist for further evaluation of your enlarged prostate and obtain an MRI for further evaluation this actually could be following up with your primary care provider.    Please understand that an ER evaluation is just the start of your evaluation. We will do what we can, but we are often unable to fully figure out what is causing your symptoms from one evaluation. Thus, our primary goal is to determine whether you need to be evaluated in the hospital or if it is safe for you to go home and see other doctors such as a primary care physician or a specialist on an outpatient basis.     Like we discussed, it is VERY IMPORTANT that you follow up with your primary care doctor (call them to set up an appointment) within the next few days or as soon as possible so that you can be re-evaluated for improvement in your symptoms or for any other questions.     A copy of your results should be included in your paperwork. If you were prescribed any medications, please take them as directed or call us back with any questions.    Please return to the emergency room within 12-48 hours if you experience fever, chills, chest pain or shortness of breath, pain with inspiration/expiration, pain that travels to your arms, neck or back, nausea, vomiting, severe headache, tearing pain in your chest, dizziness, feel as though you are about to pass out, have any worsening symptoms, or any other concerns.

## 2023-10-11 NOTE — TELEPHONE ENCOUNTER
Patient called stating that he went to an urgent care on Sunday where he was told he had a UTI and was put on antibiotics (he is now on day 4). Patient has been experiencing fever up to 102 with chills since Sunday with dysuria and feels as though he is not fully emptying his bladder. Denies n/v, denies flank pain. Asked patient to come in to be bladder scanned to see if he is retaining.

## 2023-10-12 ENCOUNTER — TELEPHONE (OUTPATIENT)
Dept: UROLOGY | Facility: CLINIC | Age: 73
End: 2023-10-12
Payer: MEDICARE

## 2023-10-12 LAB — BACTERIA SPEC AEROBE CULT: ABNORMAL

## 2023-10-12 NOTE — TELEPHONE ENCOUNTER
The hub received a call from the patient this morning.  He had visited the ER yesterday, 10/11/23, and was told to follow-up with urology, and possibly get an MRI as well.  Sending a message to the nurse to see how we need to proceed.

## 2023-10-13 ENCOUNTER — TELEPHONE (OUTPATIENT)
Dept: UROLOGY | Facility: CLINIC | Age: 73
End: 2023-10-13
Payer: MEDICARE

## 2023-10-13 ENCOUNTER — TELEPHONE (OUTPATIENT)
Dept: FAMILY MEDICINE CLINIC | Age: 73
End: 2023-10-13

## 2023-10-13 NOTE — TELEPHONE ENCOUNTER
Patient's wife called stating that they are currently at the ER in [place] due to being unable to peed. They drained over 400ml- from Mala's note it says that pt needs a cysto- I got him an appt for a cysto 10/20 @ 1020. I informed wife we will need records from [place] and to call with any further questions.

## 2023-10-13 NOTE — TELEPHONE ENCOUNTER
Pt wife called requesting pt see Dr Margareth Vann today, they were seen at an urgent care 10/8 due to Urinary Urgency, burning while urinating, unable to urinate. They then went to the [facility] ER due to urgent care advising them to if the symptoms do not improve with antibiotics. Pt did get DX with a UTI but wife states they have changed antibiotics 3x. While at the ER pt wife states the Dr at the ER did a CAT scan &  stated his prostate is extremely enlarged and pushing on the urethra causing the pressure and urgency feeling. They advised pt call urologist and/or PCP to get an MRI to further evaluate the enlarged prostate and nodule noted. Wife called Urology they are overlooking the scans to see what the Dr would like to do before appt scheduled with them (wife was unsure the date at this time). She asked if Dr Margareth Vann would be able to see him today to help relieve some urine pt has barely been urinating maybe 3x a day but not full urination, I let wife know that if he needed to be Catheterized we would not be able to do this and we would have to route him to the ER either way. I let her knwo if she would like to we can always schedule him with a APRN but our abilities are limited in the office.  Patient and his wife decided to go ahead and further care at the ER     Making PCP aware

## 2023-10-15 NOTE — ED PROVIDER NOTES
Subjective   History of Present Illness  Patient states that he was having some shortness of breath and difficulty urinating at the urology office and was sent here for further evaluation.  States has not had any urinary tract infection antibiotic for the past few days.  He has been having some chills and weakness as well as some mild shortness of breath.  He was mostly concerned about the blood in his urine.  Was told that if years ago he did have some prostate lesions although they came back negative and was told to follow-up.      Review of Systems   All other systems reviewed and are negative.      Past Medical History:   Diagnosis Date    Arthritis     Cervical disc disease     Chronic neck pain     Elevated PSA     Enlarged prostate     Hyperlipidemia     Hypertension        Allergies   Allergen Reactions    Sulfa Antibiotics Other (See Comments)     Joint pain       Past Surgical History:   Procedure Laterality Date    BACK SURGERY      FEMUR IM NAILING/RODDING Left 5/21/2018    Procedure: FEMUR INTRAMEDULLARY NAILING/RODDING;  Surgeon: Hilario Owen MD;  Location:  [facility] OR;  Service: Orthopedics    FRACTURE SURGERY      left thigh    PROSTATE BIOPSY      PROSTATE BIOPSY N/A 6/10/2019    Procedure: PROSTATE ULTRASOUND BIOPSY MRI FUSION WITH URONAV;  Surgeon: Shankar Wang MD;  Location:  [facility] OR;  Service: Urology       Family History   Problem Relation Age of Onset    Cancer Mother     Heart disease Father     Hyperlipidemia Father     Hypertension Father        Social History     Socioeconomic History    Marital status:    Tobacco Use    Smoking status: Never    Smokeless tobacco: Never   Vaping Use    Vaping Use: Never used   Substance and Sexual Activity    Alcohol use: Yes     Alcohol/week: 1.0 standard drink of alcohol     Types: 1 Cans of beer per week     Comment: only occasionally    Drug use: No    Sexual activity: Yes     Partners: Female     Birth control/protection: None            Objective   Physical Exam  Vitals and nursing note reviewed.   Constitutional:       General: He is not in acute distress.     Appearance: Normal appearance. He is not toxic-appearing or diaphoretic.   HENT:      Head: Normocephalic and atraumatic.      Right Ear: External ear normal.      Left Ear: External ear normal.      Nose: Nose normal.      Mouth/Throat:      Mouth: Mucous membranes are moist.   Eyes:      General:         Right eye: No discharge.         Left eye: No discharge.      Extraocular Movements: Extraocular movements intact.      Conjunctiva/sclera: Conjunctivae normal.   Cardiovascular:      Rate and Rhythm: Normal rate.      Pulses: Normal pulses.   Pulmonary:      Effort: Pulmonary effort is normal. No respiratory distress.      Breath sounds: No rhonchi.   Abdominal:      General: Abdomen is flat.      Tenderness: There is no abdominal tenderness. There is no guarding or rebound.   Musculoskeletal:         General: No deformity or signs of injury.   Skin:     General: Skin is warm.      Coloration: Skin is not jaundiced.   Neurological:      Mental Status: He is alert and oriented to person, place, and time. Mental status is at baseline.   Psychiatric:         Mood and Affect: Mood normal.         Behavior: Behavior normal.         Thought Content: Thought content normal.         Judgment: Judgment normal.         Procedures           ED Course  ED Course as of 10/15/23 1101   Wed Oct 11, 2023   3359 IMPRESSION:  1. Thickened urinary bladder wall even considering under distention.  This could be seen with cystitis or chronic bladder outlet obstruction.  2. Markedly enlarged and heterogeneous prostate measuring 6.7 cm in  transverse dimension. This could represent benign prostatic hypertrophy,  prostatitis, but malignancy is not excluded. Prostate MRI without and  with contrast may be useful when clinically appropriate.  3. There is a 1.0 cm asymmetric LEFT pelvic lymph node, which  "could be  reactive or willi disease. Recommend attention on follow-up.  4. Calcified atherosclerosis.     Exam was tagged in PACS with \"incidental findings\" to assist in  appropriate follow up.    [NP]      ED Course User Index  [NP] Raheem Briceño MD                                           Medical Decision Making  Michele Harvey is a very pleasant 73 y.o. male who presents to the ED for dysuria and shortness of breath.      Patient was non-toxic and not-ill appearing on arrival.     Vital signs stable on arrival.     Patient's presentation raises suspicion for differentials including, but not limited to, pneumonia, pyelonephritis, UTI.     External (non-ED) record review: none    Given this, Michele was placed on the monitor. Laboratory studies and imaging studies were ordered including cbc, cmp, ua, urine culture, CXR, CT abdomen/pelvis.     Michele was given fluids and antibiotics.    Imaging was personally reviewed and interpreted to be notable for no pneumonia or PTX.    Labs were reviewed and pertinent for a UTI and leukocytosis and mildly elevated creatinine.     Decision rules/scores evaluated: none     On re-evaluation, patient remained hemodynamically stable and appeared to be comfortable and in no acute distress.    Given findings described above, patient's presentation is most likely related to his UTI although he does have a very enlarged prostate that is concerning on CT scan. I had a long discussion about the importance of follow up regarding this finding.    At this point, after reviewing the workup, I have a low suspicion for pyelonephritis or PE given reassuring workup and lack of tachycardia or leg swelling or calf tenderness on examination.    I had a thorough discussion regarding the workup, results so far, and my impression so far. I said that I will switch his antibiotics so that he may get more coverage and that her urine cultures will result in so we can have further guidance.  He is okay " with this plan.. I said that based on the current workup, there is not an unstable medical condition requiring admission to the hospital.     I spent an appropriate amount of time necessary at the bedside preceding discharge so I could explain aftercare instructions, provide more patient eduction, give explanations of my interpretation of the evaluation, and to ensure that everyone understood the plan of care. I also discussed the fact that even after being discharged there could be an acute emergent condition that arises requiring further evaluation, admission, or even surgical intervention.     However, I said that it is VERY IMPORTANT that Michele follows up, by CALLING as soon as possible to set up an appointment, with the primary care doctor within the next few days or as soon as reasonably possible so that the symptoms can be re-evaluated for improvement or for any other questions.     I also gave Michele common sense return precautions and encouraged a quick return to the emergency department within 24 - 48hrs if there are ANY concerns, worsening of condition, new complaints, or if unable to seek follow-up in a timely fashion.     The patient verbalized understanding of the discharge instructions and agreed with them.     Michele was discharged in stable condition.        Signed by:   Raheem Briceño MD 10/15/2023 10:59 CDT   Emergency Medicine Physician    Dragon disclaimer:  Part of this note may be an electronic transcription/translation of spoken language to printed text using the Dragon Dictation System.         Problems Addressed:  Cystitis: complicated acute illness or injury  Enlarged prostate: complicated acute illness or injury  Lymphadenopathy: complicated acute illness or injury    Amount and/or Complexity of Data Reviewed  Labs: ordered.  Radiology: ordered.    Risk  Prescription drug management.        Final diagnoses:   Cystitis   Enlarged prostate   Lymphadenopathy       ED Disposition  ED  Disposition       ED Disposition   Discharge    Condition   Stable    Comment   --               Chato Milton, DO  [address]  [facility] [place] [address] [phone]    Call in 1 day  As needed, If symptoms worsen    [facility]  [address]  [facility] [id number] [phone]  Call in 1 day  For evaluation of enlarged prostate and CT scan findings         Medication List        New Prescriptions      cefdinir 300 MG capsule  Commonly known as: OMNICEF  Take 1 capsule by mouth 2 (Two) Times a Day for 7 days.               Where to Get Your Medications        These medications were sent to [facility] - [place], [place] - [address] [place] [address] - [phone]  - [phone] [address] [address]      Phone: [phone]   cefdinir 300 MG capsule            Raheem Briceño MD  10/15/23 4365

## 2023-10-16 LAB
BACTERIA SPEC AEROBE CULT: NORMAL
BACTERIA SPEC AEROBE CULT: NORMAL

## 2023-10-20 ENCOUNTER — PROCEDURE VISIT (OUTPATIENT)
Dept: UROLOGY | Facility: CLINIC | Age: 73
End: 2023-10-20
Payer: MEDICARE

## 2023-10-20 DIAGNOSIS — N30.00 ACUTE CYSTITIS WITHOUT HEMATURIA: ICD-10-CM

## 2023-10-20 DIAGNOSIS — R33.9 URINARY RETENTION: ICD-10-CM

## 2023-10-20 DIAGNOSIS — N40.1 BPH WITH OBSTRUCTION/LOWER URINARY TRACT SYMPTOMS: Primary | ICD-10-CM

## 2023-10-20 DIAGNOSIS — N13.8 BPH WITH OBSTRUCTION/LOWER URINARY TRACT SYMPTOMS: Primary | ICD-10-CM

## 2023-10-20 RX ORDER — SODIUM CHLORIDE 9 MG/ML
40 INJECTION, SOLUTION INTRAVENOUS AS NEEDED
OUTPATIENT
Start: 2023-10-20

## 2023-10-20 RX ORDER — SODIUM CHLORIDE 0.9 % (FLUSH) 0.9 %
10 SYRINGE (ML) INJECTION EVERY 12 HOURS SCHEDULED
OUTPATIENT
Start: 2023-10-20

## 2023-10-20 RX ORDER — SODIUM CHLORIDE 9 MG/ML
100 INJECTION, SOLUTION INTRAVENOUS CONTINUOUS
OUTPATIENT
Start: 2023-10-20

## 2023-10-20 RX ORDER — SODIUM CHLORIDE 0.9 % (FLUSH) 0.9 %
1-10 SYRINGE (ML) INJECTION AS NEEDED
OUTPATIENT
Start: 2023-10-20

## 2023-10-20 NOTE — PROGRESS NOTES
Subjective    Mr. Harvey is 73 y.o. male    Chief Complaint: BPH    History of Present Illness  Patient with a history of elevated PSA and BPH.  He underwent a biopsy in 2019 by Dr. Wang which showed a 55 cc prostate his biopsy was negative.  He has had issues with recurrent urinary tract infections.  He was recently treated emergency room with urinary retention and urinary tract infection.  Transverse diameters prostate on CT was 6.7 cm.  Riley catheter was placed unknown amount of urine returned.  Patient is maintained on Flomax.  He is also on testosterone cream replacement.  Last PSA 2.4.  Currently on cefdinir based on culture results from the ER.  The following portions of the patient's history were reviewed and updated as appropriate: allergies, current medications, past family history, past medical history, past social history, past surgical history and problem list.    Review of Systems      Current Outpatient Medications:     atorvastatin (LIPITOR) 40 MG tablet, Take 1 tablet by mouth Daily., Disp: , Rfl:     BIOTIN 5000 PO, Take 1 tablet by mouth Daily., Disp: , Rfl:     lisinopril (PRINIVIL,ZESTRIL) 20 MG tablet, Take 1 tablet by mouth Daily., Disp: , Rfl:     LUTEIN PO, Take 20 mg by mouth Every Other Day., Disp: , Rfl:     Misc Natural Products (PROSTATE HEALTH PO), Take 1 tablet by mouth Daily., Disp: , Rfl:     sildenafil (REVATIO) 20 MG tablet, Take 20 mg by mouth As Needed. (Patient not taking: Reported on 6/15/2023), Disp: , Rfl:     tamsulosin (FLOMAX) 0.4 MG capsule 24 hr capsule, Take 1 capsule by mouth Every Night., Disp: , Rfl:     Testosterone 20 % cream, 150 mg., Disp: , Rfl:     Current Facility-Administered Medications:     cefTRIAXone (ROCEPHIN) 1 g/100 mL 0.9% NS (MBP), 1 g, Intravenous, 30 Min Pre-Op, Shankar Wang MD    Past Medical History:   Diagnosis Date    Arthritis     Cervical disc disease     Chronic neck pain     Elevated PSA     Enlarged prostate     Hyperlipidemia      Hypertension        Past Surgical History:   Procedure Laterality Date    BACK SURGERY      FEMUR IM NAILING/RODDING Left 5/21/2018    Procedure: FEMUR INTRAMEDULLARY NAILING/RODDING;  Surgeon: Hilario Owen MD;  Location:  [facility];  Service: Orthopedics    FRACTURE SURGERY      left thigh    PROSTATE BIOPSY      PROSTATE BIOPSY N/A 6/10/2019    Procedure: PROSTATE ULTRASOUND BIOPSY MRI FUSION WITH URONAV;  Surgeon: Shankar Wang MD;  Location:  [facility];  Service: Urology       Social History     Socioeconomic History    Marital status:    Tobacco Use    Smoking status: Never    Smokeless tobacco: Never   Vaping Use    Vaping Use: Never used   Substance and Sexual Activity    Alcohol use: Yes     Alcohol/week: 1.0 standard drink of alcohol     Types: 1 Cans of beer per week     Comment: only occasionally    Drug use: No    Sexual activity: Yes     Partners: Female     Birth control/protection: None       Family History   Problem Relation Age of Onset    Cancer Mother     Heart disease Father     Hyperlipidemia Father     Hypertension Father        Objective    There were no vitals taken for this visit.    Physical Exam  Vitals reviewed.   Constitutional:       General: He is not in acute distress.     Appearance: He is well-developed. He is not diaphoretic.   Pulmonary:      Effort: Pulmonary effort is normal.   Abdominal:      General: There is no distension.      Palpations: Abdomen is soft. There is no mass.      Tenderness: There is no abdominal tenderness. There is no guarding or rebound.      Hernia: No hernia is present.   Skin:     General: Skin is warm and dry.   Neurological:      Mental Status: He is alert and oriented to person, place, and time.         CT independent review  The CT scan of the abdomen/pelvis done without contrast is available for me to review.  Treatment recommendations require an independent review.  First I scanned the liver, spleen, and bowel pattern.  The  retroperitoneum including the major vessels and lymphatic packages are briefly reviewed.  This film has been reviewed by the radiologist to determine any non-urologic abnormalities that are present.  The kidneys are closely inspected for size, symmetry, contour, parenchymal thickness, perinephric reaction, presence of calcifications, and intrarenal dilation of the collecting system.  The ureters are inspected for their course, caliber, and any calcifications.  The bladder is inspected for its thickness, size, and presence of any calcifications.  This scan shows:    The right kidney appears normal on this non-contrasted CT scan.  The renal parenchymal is normal in thickness.  There are no solid masses or cysts.  There is no hydronephrosis.  There are no stones.      The left kidney appears normal on this non-contrasted CT scan.  The renal parenchymal is normal in thickness.  There are no solid masses or cysts.  There is no hydronephrosis.  There are no stones.      The bladder appears thickened bladder wall with inflammation around the bladder prostate enlarged.     Results for orders placed or performed during the hospital encounter of 10/11/23   Blood Culture - Blood, Arm, Right    Specimen: Arm, Right; Blood   Result Value Ref Range    Blood Culture No growth at 5 days    Blood Culture - Blood, Arm, Left    Specimen: Arm, Left; Blood   Result Value Ref Range    Blood Culture No growth at 5 days    COVID-19 and FLU A/B PCR - Swab, Nasopharynx    Specimen: Nasopharynx; Swab   Result Value Ref Range    COVID19 Not Detected Not Detected - Ref. Range    Influenza A PCR Not Detected Not Detected    Influenza B PCR Not Detected Not Detected   Urine Culture - Urine, Urine, Clean Catch    Specimen: Urine, Clean Catch   Result Value Ref Range    Urine Culture <25,000 CFU/mL Gram Negative Bacilli (A)    Basic Metabolic Panel    Specimen: Blood   Result Value Ref Range    Glucose 128 (H) 65 - 99 mg/dL    BUN 21 8 - 23 mg/dL     Creatinine 1.53 (H) 0.76 - 1.27 mg/dL    Sodium 135 (L) 136 - 145 mmol/L    Potassium 3.4 (L) 3.5 - 5.2 mmol/L    Chloride 100 98 - 107 mmol/L    CO2 23.0 22.0 - 29.0 mmol/L    Calcium 9.2 8.6 - 10.5 mg/dL    BUN/Creatinine Ratio 13.7 7.0 - 25.0    Anion Gap 12.0 5.0 - 15.0 mmol/L    eGFR 47.1 (L) >60.0 mL/min/1.73   Urinalysis With Culture If Indicated - Urine, Clean Catch    Specimen: Urine, Clean Catch   Result Value Ref Range    Color, UA Orange (A) Yellow, Straw    Appearance, UA Slightly Cloudy (A) Clear    pH, UA <=5.0 5.0 - 8.0    Specific Gravity, UA 1.015 1.005 - 1.030    Glucose,  mg/dL (Trace) (A) Negative    Ketones, UA 15 mg/dL (1+) (A) Negative    Bilirubin, UA Moderate (2+) (A) Negative    Blood, UA Trace (A) Negative    Protein, UA >=300 mg/dL (3+) (A) Negative    Leuk Esterase, UA Large (3+) (A) Negative    Nitrite, UA Positive (A) Negative    Urobilinogen, UA 2.0 E.U./dL (A) 0.2 - 1.0 E.U./dL   Lactic Acid, Plasma    Specimen: Blood   Result Value Ref Range    Lactate 1.5 0.5 - 2.0 mmol/L   CBC Auto Differential    Specimen: Blood   Result Value Ref Range    WBC 12.34 (H) 3.40 - 10.80 10*3/mm3    RBC 4.57 4.14 - 5.80 10*6/mm3    Hemoglobin 13.4 13.0 - 17.7 g/dL    Hematocrit 40.2 37.5 - 51.0 %    MCV 88.0 79.0 - 97.0 fL    MCH 29.3 26.6 - 33.0 pg    MCHC 33.3 31.5 - 35.7 g/dL    RDW 13.9 12.3 - 15.4 %    RDW-SD 45.0 37.0 - 54.0 fl    MPV 10.7 6.0 - 12.0 fL    Platelets 161 140 - 450 10*3/mm3    Neutrophil % 87.2 (H) 42.7 - 76.0 %    Lymphocyte % 6.6 (L) 19.6 - 45.3 %    Monocyte % 4.9 (L) 5.0 - 12.0 %    Eosinophil % 0.2 (L) 0.3 - 6.2 %    Basophil % 0.3 0.0 - 1.5 %    Immature Grans % 0.8 (H) 0.0 - 0.5 %    Neutrophils, Absolute 10.75 (H) 1.70 - 7.00 10*3/mm3    Lymphocytes, Absolute 0.81 0.70 - 3.10 10*3/mm3    Monocytes, Absolute 0.61 0.10 - 0.90 10*3/mm3    Eosinophils, Absolute 0.03 0.00 - 0.40 10*3/mm3    Basophils, Absolute 0.04 0.00 - 0.20 10*3/mm3    Immature Grans, Absolute 0.10  (H) 0.00 - 0.05 10*3/mm3    nRBC 0.0 0.0 - 0.2 /100 WBC   Urinalysis, Microscopic Only - Urine, Clean Catch    Specimen: Urine, Clean Catch   Result Value Ref Range    RBC, UA 3-5 (A) None Seen /HPF    WBC, UA 31-50 (A) None Seen /HPF    Bacteria, UA 2+ (A) None Seen /HPF    Squamous Epithelial Cells, UA 0-2 None Seen, 0-2 /HPF    Transitional Epithelial Cells, UA 0-2 0 - 2 /HPF    Hyaline Casts, UA 0-2 None Seen /LPF    Methodology Manual Light Microscopy      Assessment and Plan    Diagnoses and all orders for this visit:    1. BPH with obstruction/lower urinary tract symptoms (Primary)    2. Urinary retention                Pre- operative diagnosis:  Benign prostatic hypertrophy    Post operative diagnosis:  BPH    Procedure:  The patient was prepped and draped in a normal sterile fashion.  The urethra was anesthetized with 2% lidocaine jelly.  A flexible cystoscope was introduced per urethra.      Urethra:  Normal    Bladder:  mild trabeculation    Ureteral orifices:  Normal position bilaterally    Prostate:  lateral lobe hypertrophy    Patient tolerated the procedure well    Complications: none    Blood loss: minimal    Follow up:    Schedule for OR  Aquablation     Discussion of options for treatment and aqua ablation resulted in significant evaluation management in addition to the cystoscopy performed today.  I discussed with the patient in depth included risk of UTI risk of postoperative bleeding risk of ejaculatory dysfunction, ED, stress incontinence, need for other procedures.  Patient voiced understanding wishes to proceed.

## 2023-10-20 NOTE — PROGRESS NOTES
Michele Harvey is a 73 y.o. male who is here today for catheter removal. Patient of Dr. Allen. 10cc syringe used to deflate the balloon and the catheter was removed without difficulty.  The patient tolerated this well and will return for surgery with Dr. Allen. Dr Allen was in the office at the time of procedure.     Patient was advised to drink clear fluids for the next couple hours and urinate. The patient was also advised he may experience some blood in the urine and burning with urination for the next couple days. If the patient is unable to urinate or develops fever, chills, N&V or suprapubic pain he will call to return for an appt at clinic or seek medical treatment at [facility] ER, PCP or Urgent Care after hours. Patient verbalized understanding and all questions were answered.   Alina TRACY have reviewed and agree with medical assistance documentation above

## 2023-10-21 ENCOUNTER — TELEPHONE (OUTPATIENT)
Dept: UROLOGY | Facility: CLINIC | Age: 73
End: 2023-10-21
Payer: MEDICARE

## 2023-10-21 RX ORDER — CEFDINIR 300 MG/1
300 CAPSULE ORAL 2 TIMES DAILY
Qty: 28 CAPSULE | Refills: 0 | Status: SHIPPED | OUTPATIENT
Start: 2023-10-21 | End: 2023-11-04

## 2023-10-21 NOTE — TELEPHONE ENCOUNTER
"Patient called the answering service reporting that he was having left scrotal contents pain, some burning with urination, and fever 101 after having cystoscopy done yesterday by Dr. Allen.  He reports having been treated in the past with a catheter in antibiotics for \"UTI\".  His last prescription appears to been 1 week of cefdinir on 10/11/2023.  He states he is able to void but is having some burning.  I sent another 2 weeks of cefdinir to the [facility] pharmacy for him to start taking tonight.  I encouraged him to drink plenty fluids, take Tylenol as needed for his fever, and go to the ER if he continues to worsen, rising fever, chills, nausea, abdominal pain, etc.  "

## 2023-10-23 ENCOUNTER — HOSPITAL ENCOUNTER (EMERGENCY)
Facility: HOSPITAL | Age: 73
Discharge: HOME | End: 2023-10-23
Attending: EMERGENCY MEDICINE | Admitting: EMERGENCY MEDICINE
Payer: MEDICARE

## 2023-10-23 ENCOUNTER — TELEPHONE (OUTPATIENT)
Dept: UROLOGY | Facility: CLINIC | Age: 73
End: 2023-10-23
Payer: MEDICARE

## 2023-10-23 ENCOUNTER — APPOINTMENT (OUTPATIENT)
Dept: CT IMAGING | Facility: HOSPITAL | Age: 73
End: 2023-10-23
Payer: MEDICARE

## 2023-10-23 ENCOUNTER — APPOINTMENT (OUTPATIENT)
Dept: ULTRASOUND IMAGING | Facility: HOSPITAL | Age: 73
End: 2023-10-23
Payer: MEDICARE

## 2023-10-23 VITALS
HEART RATE: 72 BPM | BODY MASS INDEX: 27.36 KG/M2 | OXYGEN SATURATION: 98 % | DIASTOLIC BLOOD PRESSURE: 64 MMHG | HEIGHT: 72 IN | SYSTOLIC BLOOD PRESSURE: 110 MMHG | RESPIRATION RATE: 18 BRPM | WEIGHT: 202 LBS | TEMPERATURE: 97.9 F

## 2023-10-23 DIAGNOSIS — N45.1 EPIDIDYMITIS: ICD-10-CM

## 2023-10-23 DIAGNOSIS — N39.0 URINARY TRACT INFECTION WITHOUT HEMATURIA, SITE UNSPECIFIED: Primary | ICD-10-CM

## 2023-10-23 LAB
ALBUMIN SERPL-MCNC: 3.7 G/DL (ref 3.5–5.2)
ALBUMIN/GLOB SERPL: 1.1 G/DL
ALP SERPL-CCNC: 75 U/L (ref 39–117)
ALT SERPL W P-5'-P-CCNC: 12 U/L (ref 1–41)
ANION GAP SERPL CALCULATED.3IONS-SCNC: 10 MMOL/L (ref 5–15)
AST SERPL-CCNC: 15 U/L (ref 1–40)
BACTERIA UR QL AUTO: ABNORMAL /HPF
BASOPHILS # BLD AUTO: 0.05 10*3/MM3 (ref 0–0.2)
BASOPHILS NFR BLD AUTO: 0.7 % (ref 0–1.5)
BILIRUB SERPL-MCNC: 0.4 MG/DL (ref 0–1.2)
BILIRUB UR QL STRIP: NEGATIVE
BUN SERPL-MCNC: 21 MG/DL (ref 8–23)
BUN/CREAT SERPL: 16.5 (ref 7–25)
CALCIUM SPEC-SCNC: 9.1 MG/DL (ref 8.6–10.5)
CHLORIDE SERPL-SCNC: 96 MMOL/L (ref 98–107)
CLARITY UR: CLEAR
CO2 SERPL-SCNC: 24 MMOL/L (ref 22–29)
COLOR UR: YELLOW
CREAT SERPL-MCNC: 1.27 MG/DL (ref 0.76–1.27)
D-LACTATE SERPL-SCNC: 1.3 MMOL/L (ref 0.5–2)
DEPRECATED RDW RBC AUTO: 44.4 FL (ref 37–54)
EGFRCR SERPLBLD CKD-EPI 2021: 58.9 ML/MIN/1.73
EOSINOPHIL # BLD AUTO: 0.16 10*3/MM3 (ref 0–0.4)
EOSINOPHIL NFR BLD AUTO: 2.3 % (ref 0.3–6.2)
ERYTHROCYTE [DISTWIDTH] IN BLOOD BY AUTOMATED COUNT: 13.5 % (ref 12.3–15.4)
GLOBULIN UR ELPH-MCNC: 3.5 GM/DL
GLUCOSE SERPL-MCNC: 105 MG/DL (ref 65–99)
GLUCOSE UR STRIP-MCNC: NEGATIVE MG/DL
HCT VFR BLD AUTO: 38.5 % (ref 37.5–51)
HGB BLD-MCNC: 12.4 G/DL (ref 13–17.7)
HGB UR QL STRIP.AUTO: NEGATIVE
HOLD SPECIMEN: NORMAL
HYALINE CASTS UR QL AUTO: ABNORMAL /LPF
IMM GRANULOCYTES # BLD AUTO: 0.04 10*3/MM3 (ref 0–0.05)
IMM GRANULOCYTES NFR BLD AUTO: 0.6 % (ref 0–0.5)
KETONES UR QL STRIP: NEGATIVE
LEUKOCYTE ESTERASE UR QL STRIP.AUTO: ABNORMAL
LYMPHOCYTES # BLD AUTO: 1 10*3/MM3 (ref 0.7–3.1)
LYMPHOCYTES NFR BLD AUTO: 14.6 % (ref 19.6–45.3)
MCH RBC QN AUTO: 28.8 PG (ref 26.6–33)
MCHC RBC AUTO-ENTMCNC: 32.2 G/DL (ref 31.5–35.7)
MCV RBC AUTO: 89.3 FL (ref 79–97)
MONOCYTES # BLD AUTO: 0.81 10*3/MM3 (ref 0.1–0.9)
MONOCYTES NFR BLD AUTO: 11.8 % (ref 5–12)
NEUTROPHILS NFR BLD AUTO: 4.79 10*3/MM3 (ref 1.7–7)
NEUTROPHILS NFR BLD AUTO: 70 % (ref 42.7–76)
NITRITE UR QL STRIP: NEGATIVE
NRBC BLD AUTO-RTO: 0 /100 WBC (ref 0–0.2)
PH UR STRIP.AUTO: 5.5 [PH] (ref 5–8)
PLATELET # BLD AUTO: 295 10*3/MM3 (ref 140–450)
PMV BLD AUTO: 9.6 FL (ref 6–12)
POTASSIUM SERPL-SCNC: 4 MMOL/L (ref 3.5–5.2)
PROT SERPL-MCNC: 7.2 G/DL (ref 6–8.5)
PROT UR QL STRIP: NEGATIVE
RBC # BLD AUTO: 4.31 10*6/MM3 (ref 4.14–5.8)
RBC # UR STRIP: ABNORMAL /HPF
REF LAB TEST METHOD: ABNORMAL
SODIUM SERPL-SCNC: 130 MMOL/L (ref 136–145)
SP GR UR STRIP: 1.01 (ref 1–1.03)
SQUAMOUS #/AREA URNS HPF: ABNORMAL /HPF
UROBILINOGEN UR QL STRIP: ABNORMAL
WBC # UR STRIP: ABNORMAL /HPF
WBC NRBC COR # BLD: 6.85 10*3/MM3 (ref 3.4–10.8)
WHOLE BLOOD HOLD COAG: NORMAL
WHOLE BLOOD HOLD SPECIMEN: NORMAL
YEAST URNS QL MICRO: ABNORMAL /HPF

## 2023-10-23 PROCEDURE — 81001 URINALYSIS AUTO W/SCOPE: CPT

## 2023-10-23 PROCEDURE — 25510000001 IOPAMIDOL 61 % SOLUTION

## 2023-10-23 PROCEDURE — 80053 COMPREHEN METABOLIC PANEL: CPT

## 2023-10-23 PROCEDURE — 87086 URINE CULTURE/COLONY COUNT: CPT

## 2023-10-23 PROCEDURE — 85025 COMPLETE CBC W/AUTO DIFF WBC: CPT

## 2023-10-23 PROCEDURE — 76870 US EXAM SCROTUM: CPT

## 2023-10-23 PROCEDURE — 36415 COLL VENOUS BLD VENIPUNCTURE: CPT

## 2023-10-23 PROCEDURE — 99285 EMERGENCY DEPT VISIT HI MDM: CPT

## 2023-10-23 PROCEDURE — 83605 ASSAY OF LACTIC ACID: CPT

## 2023-10-23 PROCEDURE — 93976 VASCULAR STUDY: CPT

## 2023-10-23 PROCEDURE — 74177 CT ABD & PELVIS W/CONTRAST: CPT

## 2023-10-23 RX ORDER — LEVOFLOXACIN 500 MG/1
500 TABLET, FILM COATED ORAL DAILY
Qty: 10 TABLET | Refills: 0 | Status: SHIPPED | OUTPATIENT
Start: 2023-10-23 | End: 2023-10-23 | Stop reason: SDUPTHER

## 2023-10-23 RX ORDER — SODIUM CHLORIDE 0.9 % (FLUSH) 0.9 %
10 SYRINGE (ML) INJECTION AS NEEDED
Status: DISCONTINUED | OUTPATIENT
Start: 2023-10-23 | End: 2023-10-23 | Stop reason: HOSPADM

## 2023-10-23 RX ORDER — LEVOFLOXACIN 500 MG/1
500 TABLET, FILM COATED ORAL DAILY
Qty: 10 TABLET | Refills: 0 | Status: SHIPPED | OUTPATIENT
Start: 2023-10-23 | End: 2023-11-02

## 2023-10-23 RX ADMIN — IOPAMIDOL 100 ML: 612 INJECTION, SOLUTION INTRAVENOUS at 17:27

## 2023-10-23 NOTE — ED PROVIDER NOTES
Subjective   History of Present Illness  Patient is a 68 male that is being seen in the ER today related to fever and painful urination and left-sided scrotal swelling.  Patient states he has been seen in this ER approximately 10 days ago as well as [facility] ER.  Patient has been started on multiple different antibiotics.  Recently Dr. Zuniga urology started him on a new one Saturday.  Patient states he has been taking it daily.  Patient states he called Dr. Allen's office today and explained that he does not feel well he still having painful urination intermittent fevers and swollen scrotum.  Dr. Allen's office told him to come to the ER for further evaluation.  Patient denies shortness of breath.  Patient denies any chest pain at this time.  Patient denies blood in his urine.        Review of Systems   Constitutional:  Positive for fever.   Genitourinary:         Painful urination   All other systems reviewed and are negative.      Past Medical History:   Diagnosis Date    Arthritis     Cervical disc disease     Chronic neck pain     Elevated PSA     Enlarged prostate     Hyperlipidemia     Hypertension        Allergies   Allergen Reactions    Sulfa Antibiotics Other (See Comments)     Joint pain       Past Surgical History:   Procedure Laterality Date    BACK SURGERY      FEMUR IM NAILING/RODDING Left 5/21/2018    Procedure: FEMUR INTRAMEDULLARY NAILING/RODDING;  Surgeon: Hilario Owen MD;  Location:  [facility] OR;  Service: Orthopedics    FRACTURE SURGERY      left thigh    PROSTATE BIOPSY      PROSTATE BIOPSY N/A 6/10/2019    Procedure: PROSTATE ULTRASOUND BIOPSY MRI FUSION WITH URONAV;  Surgeon: Shankar Wang MD;  Location:  [facility] OR;  Service: Urology       Family History   Problem Relation Age of Onset    Cancer Mother     Heart disease Father     Hyperlipidemia Father     Hypertension Father        Social History     Socioeconomic History    Marital status:    Tobacco Use    Smoking  status: Never    Smokeless tobacco: Never   Vaping Use    Vaping Use: Never used   Substance and Sexual Activity    Alcohol use: Yes     Alcohol/week: 1.0 standard drink of alcohol     Types: 1 Cans of beer per week     Comment: only occasionally    Drug use: No    Sexual activity: Yes     Partners: Female     Birth control/protection: None           Objective   Physical Exam  Vitals and nursing note reviewed.   Constitutional:       General: He is not in acute distress.     Appearance: Normal appearance. He is not toxic-appearing or diaphoretic.   HENT:      Head: Normocephalic and atraumatic.      Right Ear: External ear normal.      Left Ear: External ear normal.      Nose: Nose normal.      Mouth/Throat:      Mouth: Mucous membranes are moist.      Pharynx: Oropharynx is clear.   Eyes:      General:         Right eye: No discharge.         Left eye: No discharge.      Extraocular Movements: Extraocular movements intact.      Conjunctiva/sclera: Conjunctivae normal.      Pupils: Pupils are equal, round, and reactive to light.   Cardiovascular:      Rate and Rhythm: Normal rate and regular rhythm.   Pulmonary:      Effort: Pulmonary effort is normal. No respiratory distress.      Breath sounds: Normal breath sounds. No rhonchi.      Comments: Lung sounds clear and equal bilaterally.  Breathing unlabored.  Abdominal:      General: Abdomen is flat. Bowel sounds are normal.      Palpations: Abdomen is soft.      Tenderness: There is no abdominal tenderness. There is no guarding or rebound.      Comments: Abdomen soft nondistended.  Nontender upon palpation.  Negative for CVA tenderness bilaterally.   Musculoskeletal:         General: No deformity or signs of injury. Normal range of motion.      Cervical back: Normal range of motion.   Skin:     General: Skin is warm and dry.      Capillary Refill: Capillary refill takes less than 2 seconds.      Coloration: Skin is not jaundiced.   Neurological:      General: No  focal deficit present.      Mental Status: He is alert and oriented to person, place, and time. Mental status is at baseline.   Psychiatric:         Mood and Affect: Mood normal.         Behavior: Behavior normal.         Thought Content: Thought content normal.         Judgment: Judgment normal.         Procedures           ED Course  ED Course as of 10/23/23 1901   Mon Oct 23, 2023   1858 Had extensive discussion with this patient the patient was diagnosed as a urinary tract infection in the past then recently in our ER was given Omnicef he took the course Omnicef got better then started having urinary retention and therefore went to a local ER where a Riley catheter was placed patient then saw Dr. Allen where the catheter was removed and patient had a cystoscopy and then discharged home.  He started having difficulty with urination and also developing fever and therefore urology started him back on Omnicef.  He attributes his symptoms to Omnicef side effects he feels that whenever he takes Omnicef he starts feeling worse.  I have advised him that we can place him on quinolones and stop taking Omnicef for now follow-up with urology.  Also told him about the abnormal sonogram findings.  He is mildly hyponatremic that needs to be reevaluate reassess in outpatient.  Patient was strongly encouraged to come back to the ED for increasing pain fever etc.  Currently there is no clinical evidence of sepsis his procalcitonin level is negative lactic acid level is negative.  White succumbs normal.  And neutrophil to lymphocyte ratio is not high. [TS]      ED Course User Index  [TS] Henrique Fernandez MD                                           Medical Decision Making  Problems Addressed:  Epididymitis: complicated acute illness or injury  Urinary tract infection without hematuria, site unspecified: complicated acute illness or injury    Amount and/or Complexity of Data Reviewed  Labs: ordered.  Radiology:  ordered.    Risk  Prescription drug management.        Final diagnoses:   Urinary tract infection without hematuria, site unspecified       ED Disposition  ED Disposition       ED Disposition   Discharge    Condition   Stable    Comment   --               Chato Milton,   [address]  [place] [id number] [phone]    Schedule an appointment as soon as possible for a visit       [facility] EMERGENCY DEPARTMENT  [address] [id number] [phone]    If symptoms worsen    Olvin Allen MD  [address]  [place] [id number] [phone]               Medication List        New Prescriptions      levoFLOXacin 500 MG tablet  Commonly known as: LEVAQUIN  Take 1 tablet by mouth Daily for 10 days.               Where to Get Your Medications        These medications were sent to [facility] - [place], [place] - [address] [place] [address] - [phone] [facility] [phone] [address] [address]      Phone: [phone]   levoFLOXacin 500 MG tablet            Henrique Fernandez MD  10/24/23 6034

## 2023-10-23 NOTE — TELEPHONE ENCOUNTER
Patient called stating his left testicle is 3-4 times the normal size and sensitive, intermittent fever, and he believes he could be having a reaction to his antibiotic. Told patient that he needs to be seen in the ER and would prefer that he come here since he has a surgery scheduled with Dr. Allen 11/9. Patient states that he will and will call with any follow up questions.

## 2023-10-24 LAB — BACTERIA SPEC AEROBE CULT: NO GROWTH

## 2023-10-24 NOTE — DISCHARGE INSTRUCTIONS
Today you were seen and treated in the ER for UTI.  You have been started on Levaquin.  Keep in mind that you did show a low sodium level and should follow-up outpatient with your primary care.  At this time it does not appear there is anything acute we need to be kept in the hospital for.  Follow-up with Dr. Allen over the next few days.  Return back to his ER if you start having new or worsening symptoms.

## 2023-10-27 ENCOUNTER — TELEPHONE (OUTPATIENT)
Dept: UROLOGY | Facility: CLINIC | Age: 73
End: 2023-10-27
Payer: MEDICARE

## 2023-10-27 LAB
QT INTERVAL: 406 MS
QTC INTERVAL: 447 MS

## 2023-10-27 NOTE — TELEPHONE ENCOUNTER
Patient called stating he tested positive for covid on Wednesday. He is supposed to have surgery 11/9- our protocol for the office is we cannot see them until 3 weeks after they tested positive. Do you want me to postpone surgery?      Sent message to Dr. Allen

## 2023-10-31 ENCOUNTER — TELEPHONE (OUTPATIENT)
Dept: UROLOGY | Facility: CLINIC | Age: 73
End: 2023-10-31
Payer: MEDICARE

## 2023-10-31 NOTE — TELEPHONE ENCOUNTER
----- Message from Olvin Allen MD sent at 10/30/2023  7:11 AM CDT -----  Yes, he will have to be rescheduled. Thanks.   ----- Message -----  From: Suzanne Davis MA  Sent: 10/27/2023  11:02 AM CDT  To: Olvin Allen MD    Patient called stating he tested positive for covid on Wednesday. He is supposed to have surgery 11/9- our protocol for the office is we cannot see them until 3 weeks after they tested positive. Do you want me to postpone surgery?

## 2023-11-06 ENCOUNTER — OFFICE VISIT (OUTPATIENT)
Dept: FAMILY MEDICINE CLINIC | Age: 73
End: 2023-11-06
Payer: COMMERCIAL

## 2023-11-06 VITALS
HEART RATE: 54 BPM | HEIGHT: 72 IN | OXYGEN SATURATION: 98 % | SYSTOLIC BLOOD PRESSURE: 120 MMHG | WEIGHT: 200 LBS | DIASTOLIC BLOOD PRESSURE: 76 MMHG | TEMPERATURE: 98.2 F | BODY MASS INDEX: 27.09 KG/M2

## 2023-11-06 DIAGNOSIS — N41.1 CHRONIC PROSTATITIS: ICD-10-CM

## 2023-11-06 DIAGNOSIS — R33.9 URINARY RETENTION: Primary | ICD-10-CM

## 2023-11-06 DIAGNOSIS — N40.1 BENIGN PROSTATIC HYPERPLASIA WITH URINARY HESITANCY: ICD-10-CM

## 2023-11-06 DIAGNOSIS — R39.11 BENIGN PROSTATIC HYPERPLASIA WITH URINARY HESITANCY: ICD-10-CM

## 2023-11-06 DIAGNOSIS — N30.00 ACUTE CYSTITIS WITHOUT HEMATURIA: ICD-10-CM

## 2023-11-06 LAB
APPEARANCE FLUID: CLEAR
BILIRUBIN, POC: NORMAL
BLOOD URINE, POC: NORMAL
CLARITY, POC: CLEAR
COLOR, POC: YELLOW
GLUCOSE URINE, POC: NORMAL
KETONES, POC: NORMAL
LEUKOCYTE EST, POC: NORMAL
NITRITE, POC: NORMAL
PH, POC: 6.5
PROTEIN, POC: 100
SPECIFIC GRAVITY, POC: 1.02
UROBILINOGEN, POC: 0.2

## 2023-11-06 PROCEDURE — 3017F COLORECTAL CA SCREEN DOC REV: CPT | Performed by: PEDIATRICS

## 2023-11-06 PROCEDURE — 1123F ACP DISCUSS/DSCN MKR DOCD: CPT | Performed by: PEDIATRICS

## 2023-11-06 PROCEDURE — G8484 FLU IMMUNIZE NO ADMIN: HCPCS | Performed by: PEDIATRICS

## 2023-11-06 PROCEDURE — 3074F SYST BP LT 130 MM HG: CPT | Performed by: PEDIATRICS

## 2023-11-06 PROCEDURE — 3078F DIAST BP <80 MM HG: CPT | Performed by: PEDIATRICS

## 2023-11-06 PROCEDURE — 99213 OFFICE O/P EST LOW 20 MIN: CPT | Performed by: PEDIATRICS

## 2023-11-06 PROCEDURE — 1036F TOBACCO NON-USER: CPT | Performed by: PEDIATRICS

## 2023-11-06 PROCEDURE — 81002 URINALYSIS NONAUTO W/O SCOPE: CPT | Performed by: PEDIATRICS

## 2023-11-06 PROCEDURE — G8417 CALC BMI ABV UP PARAM F/U: HCPCS | Performed by: PEDIATRICS

## 2023-11-06 PROCEDURE — G8427 DOCREV CUR MEDS BY ELIG CLIN: HCPCS | Performed by: PEDIATRICS

## 2023-11-06 RX ORDER — TADALAFIL 10 MG/1
10 TABLET ORAL DAILY PRN
Qty: 30 TABLET | Refills: 1 | Status: SHIPPED | OUTPATIENT
Start: 2023-11-06

## 2023-11-06 RX ORDER — LEVOFLOXACIN 500 MG/1
500 TABLET, FILM COATED ORAL DAILY
Qty: 10 TABLET | Refills: 0 | Status: SHIPPED | OUTPATIENT
Start: 2023-11-06 | End: 2023-11-16

## 2023-11-06 NOTE — PROGRESS NOTES
(CIALIS) 10 MG tablet Take 1 tablet by mouth daily as needed for Erectile Dysfunction 30 tablet 1    ciprofloxacin (CILOXAN) 0.3 % ophthalmic solution 1 drop every 2 hours      lisinopril (PRINIVIL;ZESTRIL) 20 MG tablet Take 1 tablet by mouth daily 90 tablet 3    atorvastatin (LIPITOR) 40 MG tablet Take 1 tablet by mouth daily 90 tablet 3    tamsulosin (FLOMAX) 0.4 MG capsule Take 1 capsule by mouth nightly (Patient taking differently: Take 2 capsules by mouth nightly) 90 capsule 3    Misc Natural Products (PROSTATE HEALTH PO) Take 1 tablet by mouth daily      Cholecalciferol (VITAMIN D3) 125 MCG (5000 UT) CAPS Take 1 capsule by mouth daily      Lutein-Zeaxanthin 25-5 MG CAPS Take 1 tablet by mouth daily      Specialty Vitamins Products (BIOTIN PLUS KERATIN) 39863-957 MCG-MG TABS Take 1 tablet by mouth daily       No current facility-administered medications for this visit. Allergies: Azithromycin, Cephalosporins, and Sulfa antibiotics     Past Medical History:   Diagnosis Date    Hyperlipidemia     Hypertension        Family History   Problem Relation Age of Onset    Cancer Mother     Heart Disease Father        Past Surgical History:   Procedure Laterality Date    LEG SURGERY Left     PROSTATE BIOPSY         Social History     Tobacco Use    Smoking status: Never    Smokeless tobacco: Never   Substance Use Topics    Alcohol use: Not Currently        Review of Systems   Constitutional: Negative. HENT: Negative. Eyes: Negative. Respiratory: Negative. Cardiovascular: Negative. Gastrointestinal: Negative. Endocrine: Negative. Genitourinary: Negative. Musculoskeletal: Negative. Skin: Negative. Allergic/Immunologic: Negative. Neurological: Negative. Hematological: Negative. Psychiatric/Behavioral: Negative. Physical Exam  Constitutional:       General: He is not in acute distress. Appearance: He is well-developed. HENT:      Head: Normocephalic and atraumatic.

## 2023-11-28 ENCOUNTER — TELEPHONE (OUTPATIENT)
Dept: FAMILY MEDICINE CLINIC | Age: 73
End: 2023-11-28

## 2023-11-28 NOTE — TELEPHONE ENCOUNTER
Pt was seen at [facility] on 10/23/23 and had CT Abdomen Pelvis done and they found a 5 mm lung nodule. He received a letter in the mail regarding this and recommending that he contact his PCP for further work up. I told pt I would send this to Dr Atlas Dakins with the CT scan attached and see what he recommends. Pt understood.

## 2023-11-29 NOTE — TELEPHONE ENCOUNTER
For a 5 mm pulmonary nodule, guidelines recommend follow-up in 1 year with repeat CT scan. We can do a CT scan earlier if you have any symptoms that are concerning such as cough or shortness of breath.

## 2023-11-29 NOTE — TELEPHONE ENCOUNTER
Call returned to pt with Dr Gaby villegas.  Pt understood and will discuss this with him further at his follow up apt in March

## 2023-11-30 ENCOUNTER — PRE-ADMISSION TESTING (OUTPATIENT)
Dept: PREADMISSION TESTING | Facility: HOSPITAL | Age: 73
End: 2023-11-30
Payer: MEDICARE

## 2023-11-30 VITALS
OXYGEN SATURATION: 99 % | WEIGHT: 206.79 LBS | HEART RATE: 52 BPM | HEIGHT: 71 IN | BODY MASS INDEX: 28.95 KG/M2 | DIASTOLIC BLOOD PRESSURE: 83 MMHG | RESPIRATION RATE: 16 BRPM | SYSTOLIC BLOOD PRESSURE: 163 MMHG

## 2023-11-30 LAB
ANION GAP SERPL CALCULATED.3IONS-SCNC: 7 MMOL/L (ref 5–15)
BUN SERPL-MCNC: 15 MG/DL (ref 8–23)
BUN/CREAT SERPL: 14.3 (ref 7–25)
CALCIUM SPEC-SCNC: 9.1 MG/DL (ref 8.6–10.5)
CHLORIDE SERPL-SCNC: 105 MMOL/L (ref 98–107)
CO2 SERPL-SCNC: 28 MMOL/L (ref 22–29)
CREAT SERPL-MCNC: 1.05 MG/DL (ref 0.76–1.27)
DEPRECATED RDW RBC AUTO: 46.5 FL (ref 37–54)
EGFRCR SERPLBLD CKD-EPI 2021: 74 ML/MIN/1.73
ERYTHROCYTE [DISTWIDTH] IN BLOOD BY AUTOMATED COUNT: 14.4 % (ref 12.3–15.4)
GLUCOSE SERPL-MCNC: 91 MG/DL (ref 65–99)
HCT VFR BLD AUTO: 36.4 % (ref 37.5–51)
HGB BLD-MCNC: 11.7 G/DL (ref 13–17.7)
MCH RBC QN AUTO: 28.5 PG (ref 26.6–33)
MCHC RBC AUTO-ENTMCNC: 32.1 G/DL (ref 31.5–35.7)
MCV RBC AUTO: 88.8 FL (ref 79–97)
PLATELET # BLD AUTO: 184 10*3/MM3 (ref 140–450)
PMV BLD AUTO: 10.5 FL (ref 6–12)
POTASSIUM SERPL-SCNC: 4.3 MMOL/L (ref 3.5–5.2)
RBC # BLD AUTO: 4.1 10*6/MM3 (ref 4.14–5.8)
SODIUM SERPL-SCNC: 140 MMOL/L (ref 136–145)
WBC NRBC COR # BLD AUTO: 5.5 10*3/MM3 (ref 3.4–10.8)

## 2023-11-30 PROCEDURE — 36415 COLL VENOUS BLD VENIPUNCTURE: CPT

## 2023-11-30 PROCEDURE — 85027 COMPLETE CBC AUTOMATED: CPT

## 2023-11-30 PROCEDURE — 80048 BASIC METABOLIC PNL TOTAL CA: CPT

## 2023-11-30 NOTE — DISCHARGE INSTRUCTIONS
Before you come to the hospital        Arrival time: AS DIRECTED BY OFFICE     YOU MAY TAKE THE FOLLOWING MEDICATION(S) THE MORNING OF SURGERY WITH A SIP OF WATER: AS DIRECTED BY           DO NOT TAKE YOUR LISINOPRIL 24 HOURS PRIOR TO SURGERY           ALL OTHER HOME MEDICATION CHECK WITH YOUR PHYSICIAN (especially if   you are taking diabetes medicines or blood thinners)    Do not take any Erectile Dysfunction medications (EX: CIALIS, VIAGRA) 24 hours prior to surgery.      If you were given and instructed to use a germ- killing soap, use as directed the night before surgery and again the morning of surgery or as directed by your surgeon. (Use one-half of the bottle with each shower.)   See attached information for How to Use Chlorhexidine for Bathing if applicable.            Eating and drinking restrictions prior to scheduled arrival time    2 Hours before arrival time STOP   Drinking Clear liquids (water, black coffee-NO CREAM,  apple juice-no pulp)    Clear Liquids    Water and flavored water                                                                      Clear Fruit juices, such as cranberry juice and apple juice.  Black coffee (NO cream of any kind, including powdered).  Plain tea  Clear bouillon or broth.  Flavored gelatin.  Soda.  Gatorade or Powerade.    8 Hours before arrival time STOP   All food, full liquids, and dairy products  Full liquid examples  Juices that have pulp.  Frozen ice pops that contain fruit pieces.  Coffee with creamer  Milk.  Yogurt.    (It is extremely important that you follow these guidelines to prevent delay or cancelation of your procedure)                       MANAGING PAIN AFTER SURGERY    We know you are probably wondering what your pain will be like after surgery.  Following surgery it is unrealistic to expect you will not have pain.   Pain is how our bodies let us know that something is wrong or cautions us to be careful.  That said, our goal is to make your pain  tolerable.    Methods we may use to treat your pain include (oral or IV medications, PCAs, epidurals, nerve blocks, etc.)   While some procedures require IV pain medications for a short time after surgery, transitioning to pain medications by mouth allows for better management of pain.   Your nurse will encourage you to take oral pain medications whenever possible.  IV medications work almost immediately, but only last a short while.  Taking medications by mouth allows for a more constant level of medication in your blood stream for a longer period of time.      Once your pain is out of control it is harder to get back under control.  It is important you are aware when your next dose of pain medication is due.  If you are admitted, your nurse may write the time of your next dose on the white board in your room to help you remember.      We are interested in your pain and encourage you to inform us about aggravating factors during your visit.   Many times a simple repositioning every few hours can make a big difference.    If your physician says it is okay, do not let your pain prevent you from getting out of bed. Be sure to call your nurse for assistance prior to getting up so you do not fall.      Before surgery, please decide your tolerable pain goal.  These faces help describe the pain ratings we use on a 0-10 scale.   Be prepared to tell us your goal and whether or not you take pain or anxiety medications at home.          Preparing for Surgery  Preparing for surgery is an important part of your care. It can make things go more smoothly and help you avoid complications. The steps leading up to surgery may vary among hospitals. Follow all instructions given to you by your health care providers. Ask questions if you do not understand something. Talk about any concerns that you have.  Here are some questions to consider asking before your surgery:  If my surgery is not an emergency (is elective), when would be the  best time to have the surgery?  What arrangements do I need to make for work, home, or school?  What will my recovery be like? How long will it be before I can return to normal activities?  Will I need to prepare my home? Will I need to arrange care for me or my children?  Should I expect to have pain after surgery? What are my pain management options? Are there nonmedical options that I can try for pain?  Tell a health care provider about:  Any allergies you have.  All medicines you are taking, including vitamins, herbs, eye drops, creams, and over-the-counter medicines.  Any problems you or family members have had with anesthetic medicines.  Any blood disorders you have.  Any surgeries you have had.  Any medical conditions you have.  Whether you are pregnant or may be pregnant.  What are the risks?  The risks and complications of surgery depend on the specific procedure that you have. Discuss all the risks with your health care providers before your surgery. Ask about common surgical complications, which may include:  Infection.  Bleeding or a need for blood replacement (transfusion).  Allergic reactions to medicines.  Damage to surrounding nerves, tissues, or structures.  A blood clot.  Scarring.  Failure of the surgery to correct the problem.  Follow these instructions before the procedure:  Several days or weeks before your procedure  You may have a physical exam by your primary health care provider to make sure it is safe for you to have surgery.  You may have testing. This may include a chest X-ray, blood and urine tests, electrocardiogram (ECG), or other testing.  Ask your health care provider about:  Changing or stopping your regular medicines. This is especially important if you are taking diabetes medicines or blood thinners.  Taking medicines such as aspirin and ibuprofen. These medicines can thin your blood. Do not take these medicines unless your health care provider tells you to take them.  Taking  over-the-counter medicines, vitamins, herbs, and supplements.  Do not use any products that contain nicotine or tobacco, such as cigarettes and e-cigarettes. If you need help quitting, ask your health care provider.  Avoid alcohol.  Ask your health care provider if there are exercises you can do to prepare for surgery.  Eat a healthy diet.   Plan to have someone 18 years of age or older to take you home from the hospital. We will need to verify your ride on the morning of surgery if you are being discharged home on the same day. Tell your ride to be expecting a call from the hospital prior to your procedure.   Plan to have a responsible adult care for you for at least 24 hours after you leave the hospital or clinic. This is important.  The day before your procedure  You may be given antibiotic medicine to take by mouth to help prevent infection. Take it as told by your health care provider.  You may be asked to shower with a germ-killing soap.  Follow instructions from your health care provider about eating and drinking restrictions. This includes gum, mints and hard candy.  Pack comfortable clothes according to your procedure.   The day of your procedure  You may need to take another shower with a germ-killing soap before you leave home in the morning.  With a small sip of water, take only the medicines that you are told to take.  Remove all jewelry including rings.   Leave anything you consider valuable at home except hearing aids if needed.  You do not need to bring your home medications into the hospital.   Do not wear any makeup, nail polish, powder, deodorant, lotion, hair accessories, or anything on your skin or body except your clothes.  If you will be staying in the hospital, bring a case to hold your glasses, contacts, or dentures. You may also want to bring your robe and non-skid footwear.       (Do not use denture adhesives since you will be asked to remove them during  surgery).   If you wear oxygen at  home, bring it with you the day of surgery.  If instructed by your health care provider, bring your sleep apnea device with you on the day of your surgery (if this applies to you).  You may want to leave your suitcase and sleep apnea device in the car until after surgery.   Arrive at the hospital as scheduled.  Bring a friend or family member with you who can help to answer questions and be present while you meet with your health care provider.  At the hospital  When you arrive at the hospital:  Go to registration located at the main entrance of the hospital. You will be registered and given a beeper and a sticker sheet. Take the stickers to the Outpatient nurses desk and place in the black tray. This is to notify staff that you have arrived. Then return to the lobby to wait.   When your beeper lights up and vibrates proceed through the double doors, under the stairs, and a member of the Outpatient Surgery staff will escort you to your preoperative room.  You may have to wear compression sleeves. These help to prevent blood clots and reduce swelling in your legs.  An IV may be inserted into one of your veins.              In the operating room, you may be given one or more of the following:        A medicine to help you relax (sedative).        A medicine to numb the area (local anesthetic).        A medicine to make you fall asleep (general anesthetic).        A medicine that is injected into an area of your body to numb everything below the                      injection site (regional anesthetic).  You may be given an antibiotic through your IV to help prevent infection.  Your surgical site will be marked or identified.    Contact a health care provider if you:  Develop a fever of more than 100.4°F (38°C) or other feelings of illness during the 48 hours before your surgery.  Have symptoms that get worse.  Have questions or concerns about your surgery.  Summary  Preparing for surgery can make the procedure go more  smoothly and lower your risk of complications.  Before surgery, make a list of questions and concerns to discuss with your surgeon. Ask about the risks and possible complications.  In the days or weeks before your surgery, follow all instructions from your health care provider. You may need to stop smoking, avoid alcohol, follow eating restrictions, and change or stop your regular medicines.  Contact your surgeon if you develop a fever or other signs of illness during the few days before your surgery.  This information is not intended to replace advice given to you by your health care provider. Make sure you discuss any questions you have with your health care provider.  Document Revised: 12/21/2018 Document Reviewed: 10/23/2018  [employer] Patient Education © 2021 [employer]

## 2023-12-07 ENCOUNTER — ANESTHESIA EVENT (OUTPATIENT)
Dept: PERIOP | Facility: HOSPITAL | Age: 73
End: 2023-12-07
Payer: MEDICARE

## 2023-12-07 ENCOUNTER — ANESTHESIA (OUTPATIENT)
Dept: PERIOP | Facility: HOSPITAL | Age: 73
End: 2023-12-07
Payer: MEDICARE

## 2023-12-07 ENCOUNTER — HOSPITAL ENCOUNTER (OUTPATIENT)
Facility: HOSPITAL | Age: 73
Discharge: HOME | End: 2023-12-08
Attending: UROLOGY | Admitting: UROLOGY
Payer: MEDICARE

## 2023-12-07 DIAGNOSIS — N40.1 BPH WITH URINARY OBSTRUCTION: Primary | ICD-10-CM

## 2023-12-07 DIAGNOSIS — N13.8 BPH WITH OBSTRUCTION/LOWER URINARY TRACT SYMPTOMS: ICD-10-CM

## 2023-12-07 DIAGNOSIS — N40.1 BPH WITH OBSTRUCTION/LOWER URINARY TRACT SYMPTOMS: ICD-10-CM

## 2023-12-07 DIAGNOSIS — N13.8 BPH WITH URINARY OBSTRUCTION: Primary | ICD-10-CM

## 2023-12-07 LAB
BASOPHILS # BLD AUTO: 0.02 10*3/MM3 (ref 0–0.2)
BASOPHILS NFR BLD AUTO: 0.4 % (ref 0–1.5)
DEPRECATED RDW RBC AUTO: 46.3 FL (ref 37–54)
EOSINOPHIL # BLD AUTO: 0.22 10*3/MM3 (ref 0–0.4)
EOSINOPHIL NFR BLD AUTO: 4.3 % (ref 0.3–6.2)
ERYTHROCYTE [DISTWIDTH] IN BLOOD BY AUTOMATED COUNT: 14.2 % (ref 12.3–15.4)
HCT VFR BLD AUTO: 32 % (ref 37.5–51)
HGB BLD-MCNC: 10.3 G/DL (ref 13–17.7)
IMM GRANULOCYTES # BLD AUTO: 0.01 10*3/MM3 (ref 0–0.05)
IMM GRANULOCYTES NFR BLD AUTO: 0.2 % (ref 0–0.5)
LYMPHOCYTES # BLD AUTO: 0.93 10*3/MM3 (ref 0.7–3.1)
LYMPHOCYTES NFR BLD AUTO: 18.2 % (ref 19.6–45.3)
MCH RBC QN AUTO: 28.9 PG (ref 26.6–33)
MCHC RBC AUTO-ENTMCNC: 32.2 G/DL (ref 31.5–35.7)
MCV RBC AUTO: 89.6 FL (ref 79–97)
MONOCYTES # BLD AUTO: 0.28 10*3/MM3 (ref 0.1–0.9)
MONOCYTES NFR BLD AUTO: 5.5 % (ref 5–12)
NEUTROPHILS NFR BLD AUTO: 3.66 10*3/MM3 (ref 1.7–7)
NEUTROPHILS NFR BLD AUTO: 71.4 % (ref 42.7–76)
NRBC BLD AUTO-RTO: 0 /100 WBC (ref 0–0.2)
PLATELET # BLD AUTO: 128 10*3/MM3 (ref 140–450)
PMV BLD AUTO: 10.1 FL (ref 6–12)
RBC # BLD AUTO: 3.57 10*6/MM3 (ref 4.14–5.8)
WBC NRBC COR # BLD AUTO: 5.12 10*3/MM3 (ref 3.4–10.8)

## 2023-12-07 PROCEDURE — 25010000002 CEFAZOLIN PER 500 MG: Performed by: NURSE ANESTHETIST, CERTIFIED REGISTERED

## 2023-12-07 PROCEDURE — 85025 COMPLETE CBC W/AUTO DIFF WBC: CPT | Performed by: UROLOGY

## 2023-12-07 PROCEDURE — 63710000001 OXYBUTYNIN XL 5 MG TABLET SUSTAINED-RELEASE 24 HOUR: Performed by: UROLOGY

## 2023-12-07 PROCEDURE — 25010000002 DROPERIDOL PER 5 MG: Performed by: ANESTHESIOLOGY

## 2023-12-07 PROCEDURE — 0421T PR TRANSURETHRAL WATERJET ABLATION PROSTATE COMPL: CPT | Performed by: UROLOGY

## 2023-12-07 PROCEDURE — 25010000002 FENTANYL CITRATE (PF) 50 MCG/ML SOLUTION: Performed by: NURSE ANESTHETIST, CERTIFIED REGISTERED

## 2023-12-07 PROCEDURE — 25010000002 VASOPRESSIN 20 UNIT/ML SOLUTION: Performed by: NURSE ANESTHETIST, CERTIFIED REGISTERED

## 2023-12-07 PROCEDURE — 25810000003 LACTATED RINGERS PER 1000 ML: Performed by: UROLOGY

## 2023-12-07 PROCEDURE — 25010000002 PROPOFOL 10 MG/ML EMULSION: Performed by: NURSE ANESTHETIST, CERTIFIED REGISTERED

## 2023-12-07 PROCEDURE — 88305 TISSUE EXAM BY PATHOLOGIST: CPT | Performed by: UROLOGY

## 2023-12-07 PROCEDURE — 25010000002 ONDANSETRON PER 1 MG: Performed by: NURSE ANESTHETIST, CERTIFIED REGISTERED

## 2023-12-07 PROCEDURE — 25810000003 LACTATED RINGERS PER 1000 ML: Performed by: ANESTHESIOLOGY

## 2023-12-07 PROCEDURE — 25010000002 DEXAMETHASONE PER 1 MG: Performed by: NURSE ANESTHETIST, CERTIFIED REGISTERED

## 2023-12-07 PROCEDURE — 63710000001 HYDROCODONE-ACETAMINOPHEN 10-325 MG TABLET: Performed by: ANESTHESIOLOGY

## 2023-12-07 PROCEDURE — C2596 PROBE, ROBOTIC, WATER-JET: HCPCS | Performed by: UROLOGY

## 2023-12-07 PROCEDURE — 25010000002 FENTANYL CITRATE (PF) 50 MCG/ML SOLUTION: Performed by: ANESTHESIOLOGY

## 2023-12-07 PROCEDURE — 88312 SPECIAL STAINS GROUP 1: CPT | Performed by: UROLOGY

## 2023-12-07 PROCEDURE — 25010000002 LEVOFLOXACIN PER 250 MG: Performed by: UROLOGY

## 2023-12-07 PROCEDURE — A9270 NON-COVERED ITEM OR SERVICE: HCPCS | Performed by: UROLOGY

## 2023-12-07 PROCEDURE — A9270 NON-COVERED ITEM OR SERVICE: HCPCS | Performed by: ANESTHESIOLOGY

## 2023-12-07 RX ORDER — SODIUM CHLORIDE 0.9 % (FLUSH) 0.9 %
3 SYRINGE (ML) INJECTION AS NEEDED
Status: DISCONTINUED | OUTPATIENT
Start: 2023-12-07 | End: 2023-12-07 | Stop reason: HOSPADM

## 2023-12-07 RX ORDER — LIDOCAINE HYDROCHLORIDE 10 MG/ML
0.5 INJECTION, SOLUTION EPIDURAL; INFILTRATION; INTRACAUDAL; PERINEURAL ONCE AS NEEDED
Status: DISCONTINUED | OUTPATIENT
Start: 2023-12-07 | End: 2023-12-07 | Stop reason: HOSPADM

## 2023-12-07 RX ORDER — LIDOCAINE HYDROCHLORIDE 20 MG/ML
INJECTION, SOLUTION EPIDURAL; INFILTRATION; INTRACAUDAL; PERINEURAL AS NEEDED
Status: DISCONTINUED | OUTPATIENT
Start: 2023-12-07 | End: 2023-12-07 | Stop reason: SURG

## 2023-12-07 RX ORDER — NALOXONE HCL 0.4 MG/ML
0.4 VIAL (ML) INJECTION AS NEEDED
Status: DISCONTINUED | OUTPATIENT
Start: 2023-12-07 | End: 2023-12-07 | Stop reason: HOSPADM

## 2023-12-07 RX ORDER — DROPERIDOL 2.5 MG/ML
0.62 INJECTION, SOLUTION INTRAMUSCULAR; INTRAVENOUS ONCE AS NEEDED
Status: COMPLETED | OUTPATIENT
Start: 2023-12-07 | End: 2023-12-07

## 2023-12-07 RX ORDER — ONDANSETRON 4 MG/1
4 TABLET, FILM COATED ORAL EVERY 6 HOURS PRN
Status: DISCONTINUED | OUTPATIENT
Start: 2023-12-07 | End: 2023-12-08 | Stop reason: HOSPADM

## 2023-12-07 RX ORDER — MAGNESIUM HYDROXIDE 1200 MG/15ML
LIQUID ORAL AS NEEDED
Status: DISCONTINUED | OUTPATIENT
Start: 2023-12-07 | End: 2023-12-07 | Stop reason: HOSPADM

## 2023-12-07 RX ORDER — SODIUM CHLORIDE 0.9 % (FLUSH) 0.9 %
3-10 SYRINGE (ML) INJECTION AS NEEDED
Status: DISCONTINUED | OUTPATIENT
Start: 2023-12-07 | End: 2023-12-07 | Stop reason: HOSPADM

## 2023-12-07 RX ORDER — ONDANSETRON 2 MG/ML
INJECTION INTRAMUSCULAR; INTRAVENOUS AS NEEDED
Status: DISCONTINUED | OUTPATIENT
Start: 2023-12-07 | End: 2023-12-07 | Stop reason: SURG

## 2023-12-07 RX ORDER — PROPOFOL 10 MG/ML
VIAL (ML) INTRAVENOUS AS NEEDED
Status: DISCONTINUED | OUTPATIENT
Start: 2023-12-07 | End: 2023-12-07 | Stop reason: SURG

## 2023-12-07 RX ORDER — SODIUM CHLORIDE 0.9 % (FLUSH) 0.9 %
1-10 SYRINGE (ML) INJECTION AS NEEDED
Status: DISCONTINUED | OUTPATIENT
Start: 2023-12-07 | End: 2023-12-07 | Stop reason: HOSPADM

## 2023-12-07 RX ORDER — CEFAZOLIN SODIUM 1 G/3ML
INJECTION, POWDER, FOR SOLUTION INTRAMUSCULAR; INTRAVENOUS AS NEEDED
Status: DISCONTINUED | OUTPATIENT
Start: 2023-12-07 | End: 2023-12-07 | Stop reason: SURG

## 2023-12-07 RX ORDER — SODIUM CHLORIDE 9 MG/ML
40 INJECTION, SOLUTION INTRAVENOUS AS NEEDED
Status: DISCONTINUED | OUTPATIENT
Start: 2023-12-07 | End: 2023-12-07 | Stop reason: HOSPADM

## 2023-12-07 RX ORDER — SODIUM CHLORIDE 9 MG/ML
100 INJECTION, SOLUTION INTRAVENOUS CONTINUOUS
Status: DISCONTINUED | OUTPATIENT
Start: 2023-12-07 | End: 2023-12-07

## 2023-12-07 RX ORDER — ONDANSETRON 2 MG/ML
4 INJECTION INTRAMUSCULAR; INTRAVENOUS EVERY 6 HOURS PRN
Status: DISCONTINUED | OUTPATIENT
Start: 2023-12-07 | End: 2023-12-08 | Stop reason: HOSPADM

## 2023-12-07 RX ORDER — HYDROCODONE BITARTRATE AND ACETAMINOPHEN 5; 325 MG/1; MG/1
1 TABLET ORAL ONCE AS NEEDED
Status: DISCONTINUED | OUTPATIENT
Start: 2023-12-07 | End: 2023-12-07 | Stop reason: HOSPADM

## 2023-12-07 RX ORDER — METOCLOPRAMIDE HYDROCHLORIDE 5 MG/ML
10 INJECTION INTRAMUSCULAR; INTRAVENOUS EVERY 6 HOURS PRN
Status: DISCONTINUED | OUTPATIENT
Start: 2023-12-07 | End: 2023-12-08 | Stop reason: HOSPADM

## 2023-12-07 RX ORDER — DOCUSATE SODIUM 100 MG/1
100 CAPSULE, LIQUID FILLED ORAL 2 TIMES DAILY PRN
Status: DISCONTINUED | OUTPATIENT
Start: 2023-12-07 | End: 2023-12-08 | Stop reason: HOSPADM

## 2023-12-07 RX ORDER — DEXAMETHASONE SODIUM PHOSPHATE 4 MG/ML
INJECTION, SOLUTION INTRA-ARTICULAR; INTRALESIONAL; INTRAMUSCULAR; INTRAVENOUS; SOFT TISSUE AS NEEDED
Status: DISCONTINUED | OUTPATIENT
Start: 2023-12-07 | End: 2023-12-07 | Stop reason: SURG

## 2023-12-07 RX ORDER — OXYBUTYNIN CHLORIDE 5 MG/1
5 TABLET, EXTENDED RELEASE ORAL DAILY
Status: DISCONTINUED | OUTPATIENT
Start: 2023-12-07 | End: 2023-12-08 | Stop reason: HOSPADM

## 2023-12-07 RX ORDER — PROMETHAZINE HYDROCHLORIDE 25 MG/1
12.5 TABLET ORAL EVERY 6 HOURS PRN
Status: DISCONTINUED | OUTPATIENT
Start: 2023-12-07 | End: 2023-12-08 | Stop reason: HOSPADM

## 2023-12-07 RX ORDER — HYDROCODONE BITARTRATE AND ACETAMINOPHEN 5; 325 MG/1; MG/1
1 TABLET ORAL EVERY 4 HOURS PRN
Status: DISCONTINUED | OUTPATIENT
Start: 2023-12-07 | End: 2023-12-08 | Stop reason: HOSPADM

## 2023-12-07 RX ORDER — HYDROMORPHONE HYDROCHLORIDE 1 MG/ML
0.5 INJECTION, SOLUTION INTRAMUSCULAR; INTRAVENOUS; SUBCUTANEOUS
Status: DISCONTINUED | OUTPATIENT
Start: 2023-12-07 | End: 2023-12-08 | Stop reason: HOSPADM

## 2023-12-07 RX ORDER — ROCURONIUM BROMIDE 10 MG/ML
INJECTION, SOLUTION INTRAVENOUS AS NEEDED
Status: DISCONTINUED | OUTPATIENT
Start: 2023-12-07 | End: 2023-12-07 | Stop reason: SURG

## 2023-12-07 RX ORDER — HYDROCODONE BITARTRATE AND ACETAMINOPHEN 10; 325 MG/1; MG/1
1 TABLET ORAL EVERY 4 HOURS PRN
Status: DISCONTINUED | OUTPATIENT
Start: 2023-12-07 | End: 2023-12-07 | Stop reason: HOSPADM

## 2023-12-07 RX ORDER — TAMSULOSIN HYDROCHLORIDE 0.4 MG/1
1 CAPSULE ORAL DAILY
COMMUNITY
End: 2023-12-08 | Stop reason: HOSPADM

## 2023-12-07 RX ORDER — NEOSTIGMINE METHYLSULFATE 5 MG/5 ML
SYRINGE (ML) INTRAVENOUS AS NEEDED
Status: DISCONTINUED | OUTPATIENT
Start: 2023-12-07 | End: 2023-12-07 | Stop reason: SURG

## 2023-12-07 RX ORDER — SODIUM CHLORIDE 0.9 % (FLUSH) 0.9 %
3 SYRINGE (ML) INJECTION EVERY 12 HOURS SCHEDULED
Status: DISCONTINUED | OUTPATIENT
Start: 2023-12-07 | End: 2023-12-07 | Stop reason: HOSPADM

## 2023-12-07 RX ORDER — SODIUM CHLORIDE 0.9 % (FLUSH) 0.9 %
10 SYRINGE (ML) INJECTION EVERY 12 HOURS SCHEDULED
Status: DISCONTINUED | OUTPATIENT
Start: 2023-12-07 | End: 2023-12-07 | Stop reason: HOSPADM

## 2023-12-07 RX ORDER — HYDROCODONE BITARTRATE AND ACETAMINOPHEN 10; 325 MG/1; MG/1
1 TABLET ORAL EVERY 4 HOURS PRN
Status: DISCONTINUED | OUTPATIENT
Start: 2023-12-07 | End: 2023-12-08 | Stop reason: HOSPADM

## 2023-12-07 RX ORDER — LABETALOL HYDROCHLORIDE 5 MG/ML
5 INJECTION, SOLUTION INTRAVENOUS
Status: DISCONTINUED | OUTPATIENT
Start: 2023-12-07 | End: 2023-12-07 | Stop reason: HOSPADM

## 2023-12-07 RX ORDER — FENTANYL CITRATE 50 UG/ML
25 INJECTION, SOLUTION INTRAMUSCULAR; INTRAVENOUS
Status: DISCONTINUED | OUTPATIENT
Start: 2023-12-07 | End: 2023-12-07 | Stop reason: HOSPADM

## 2023-12-07 RX ORDER — NALOXONE HCL 0.4 MG/ML
0.1 VIAL (ML) INJECTION
Status: DISCONTINUED | OUTPATIENT
Start: 2023-12-07 | End: 2023-12-08 | Stop reason: HOSPADM

## 2023-12-07 RX ORDER — EPHEDRINE SULFATE 50 MG/ML
INJECTION, SOLUTION INTRAVENOUS AS NEEDED
Status: DISCONTINUED | OUTPATIENT
Start: 2023-12-07 | End: 2023-12-07 | Stop reason: SURG

## 2023-12-07 RX ORDER — IBUPROFEN 600 MG/1
600 TABLET ORAL ONCE AS NEEDED
Status: DISCONTINUED | OUTPATIENT
Start: 2023-12-07 | End: 2023-12-07 | Stop reason: HOSPADM

## 2023-12-07 RX ORDER — ONDANSETRON 2 MG/ML
4 INJECTION INTRAMUSCULAR; INTRAVENOUS ONCE AS NEEDED
Status: DISCONTINUED | OUTPATIENT
Start: 2023-12-07 | End: 2023-12-07 | Stop reason: HOSPADM

## 2023-12-07 RX ORDER — FLUMAZENIL 0.1 MG/ML
0.2 INJECTION INTRAVENOUS AS NEEDED
Status: DISCONTINUED | OUTPATIENT
Start: 2023-12-07 | End: 2023-12-07 | Stop reason: HOSPADM

## 2023-12-07 RX ORDER — LEVOFLOXACIN 5 MG/ML
500 INJECTION, SOLUTION INTRAVENOUS
Status: COMPLETED | OUTPATIENT
Start: 2023-12-07 | End: 2023-12-07

## 2023-12-07 RX ORDER — FENTANYL CITRATE 50 UG/ML
INJECTION, SOLUTION INTRAMUSCULAR; INTRAVENOUS AS NEEDED
Status: DISCONTINUED | OUTPATIENT
Start: 2023-12-07 | End: 2023-12-07 | Stop reason: SURG

## 2023-12-07 RX ORDER — SODIUM CHLORIDE, SODIUM LACTATE, POTASSIUM CHLORIDE, CALCIUM CHLORIDE 600; 310; 30; 20 MG/100ML; MG/100ML; MG/100ML; MG/100ML
1000 INJECTION, SOLUTION INTRAVENOUS CONTINUOUS
Status: DISCONTINUED | OUTPATIENT
Start: 2023-12-07 | End: 2023-12-07

## 2023-12-07 RX ORDER — BUPIVACAINE HCL/0.9 % NACL/PF 0.125 %
PLASTIC BAG, INJECTION (ML) EPIDURAL AS NEEDED
Status: DISCONTINUED | OUTPATIENT
Start: 2023-12-07 | End: 2023-12-07 | Stop reason: SURG

## 2023-12-07 RX ORDER — SODIUM CHLORIDE, SODIUM LACTATE, POTASSIUM CHLORIDE, CALCIUM CHLORIDE 600; 310; 30; 20 MG/100ML; MG/100ML; MG/100ML; MG/100ML
100 INJECTION, SOLUTION INTRAVENOUS CONTINUOUS
Status: DISCONTINUED | OUTPATIENT
Start: 2023-12-07 | End: 2023-12-07

## 2023-12-07 RX ADMIN — LIDOCAINE HYDROCHLORIDE 100 MG: 20 INJECTION, SOLUTION EPIDURAL; INFILTRATION; INTRACAUDAL; PERINEURAL at 08:41

## 2023-12-07 RX ADMIN — Medication 200 MCG: at 08:56

## 2023-12-07 RX ADMIN — Medication 3 MG: at 09:44

## 2023-12-07 RX ADMIN — DROPERIDOL 0.62 MG: 2.5 INJECTION, SOLUTION INTRAMUSCULAR; INTRAVENOUS at 10:20

## 2023-12-07 RX ADMIN — SODIUM CHLORIDE, POTASSIUM CHLORIDE, SODIUM LACTATE AND CALCIUM CHLORIDE 100 ML/HR: 600; 310; 30; 20 INJECTION, SOLUTION INTRAVENOUS at 10:32

## 2023-12-07 RX ADMIN — EPHEDRINE SULFATE 5 MG: 50 INJECTION INTRAVENOUS at 09:23

## 2023-12-07 RX ADMIN — FENTANYL CITRATE 25 MCG: 50 INJECTION, SOLUTION INTRAMUSCULAR; INTRAVENOUS at 10:22

## 2023-12-07 RX ADMIN — EPHEDRINE SULFATE 5 MG: 50 INJECTION INTRAVENOUS at 09:40

## 2023-12-07 RX ADMIN — FENTANYL CITRATE 25 MCG: 50 INJECTION, SOLUTION INTRAMUSCULAR; INTRAVENOUS at 10:27

## 2023-12-07 RX ADMIN — Medication 200 MCG: at 09:04

## 2023-12-07 RX ADMIN — ONDANSETRON 4 MG: 2 INJECTION INTRAMUSCULAR; INTRAVENOUS at 08:38

## 2023-12-07 RX ADMIN — ROCURONIUM BROMIDE 40 MG: 10 INJECTION, SOLUTION INTRAVENOUS at 08:41

## 2023-12-07 RX ADMIN — EPHEDRINE SULFATE 5 MG: 50 INJECTION INTRAVENOUS at 08:58

## 2023-12-07 RX ADMIN — PROPOFOL 200 MG: 10 INJECTION, EMULSION INTRAVENOUS at 08:41

## 2023-12-07 RX ADMIN — OXYBUTYNIN CHLORIDE 5 MG: 5 TABLET, EXTENDED RELEASE ORAL at 18:31

## 2023-12-07 RX ADMIN — Medication 100 MCG: at 08:48

## 2023-12-07 RX ADMIN — FENTANYL CITRATE 100 MCG: 50 INJECTION, SOLUTION INTRAMUSCULAR; INTRAVENOUS at 08:39

## 2023-12-07 RX ADMIN — GLYCOPYRROLATE 0.6 MG: 0.2 INJECTION INTRAMUSCULAR; INTRAVENOUS at 09:43

## 2023-12-07 RX ADMIN — SODIUM CHLORIDE, POTASSIUM CHLORIDE, SODIUM LACTATE AND CALCIUM CHLORIDE 1000 ML: 600; 310; 30; 20 INJECTION, SOLUTION INTRAVENOUS at 06:50

## 2023-12-07 RX ADMIN — HYDROCODONE BITARTRATE AND ACETAMINOPHEN 1 TABLET: 10; 325 TABLET ORAL at 10:49

## 2023-12-07 RX ADMIN — GLYCOPYRROLATE 0.2 MG: 0.2 INJECTION INTRAMUSCULAR; INTRAVENOUS at 08:47

## 2023-12-07 RX ADMIN — DEXAMETHASONE SODIUM PHOSPHATE 8 MG: 4 INJECTION, SOLUTION INTRAMUSCULAR; INTRAVENOUS at 08:46

## 2023-12-07 RX ADMIN — LIDOCAINE HYDROCHLORIDE 50 MG: 20 INJECTION, SOLUTION EPIDURAL; INFILTRATION; INTRACAUDAL; PERINEURAL at 09:44

## 2023-12-07 RX ADMIN — EPHEDRINE SULFATE 5 MG: 50 INJECTION INTRAVENOUS at 08:50

## 2023-12-07 RX ADMIN — Medication 200 MCG: at 09:13

## 2023-12-07 RX ADMIN — SODIUM CHLORIDE, POTASSIUM CHLORIDE, SODIUM LACTATE AND CALCIUM CHLORIDE 100 ML/HR: 600; 310; 30; 20 INJECTION, SOLUTION INTRAVENOUS at 10:00

## 2023-12-07 RX ADMIN — CEFAZOLIN 2 G: 330 INJECTION, POWDER, FOR SOLUTION INTRAMUSCULAR; INTRAVENOUS at 08:55

## 2023-12-07 RX ADMIN — LEVOFLOXACIN 500 MG: 500 INJECTION, SOLUTION INTRAVENOUS at 08:33

## 2023-12-07 RX ADMIN — SODIUM CHLORIDE, POTASSIUM CHLORIDE, SODIUM LACTATE AND CALCIUM CHLORIDE: 600; 310; 30; 20 INJECTION, SOLUTION INTRAVENOUS at 09:09

## 2023-12-07 NOTE — H&P
[facility]   PREOPERATIVE HISTORY AND PHYSICAL    Patient Name:Michele Harvey  : 1950  MRN: [id number]  Primary Care Physician: Chato Milton DO  Date of admission: 2023    Subjective   Subjective     Chief Complaint: preoperative evaluation    History of Present Illness  Michele Harvey is a 73 y.o. male who presents for preoperative evaluation. He is scheduled for TRANSURETHRAL PROSTATE AQUABLATION (N/A)    Review of Systems   Constitutional:  Negative for chills and fever.   Gastrointestinal:  Negative for abdominal pain, anal bleeding and blood in stool.   Genitourinary:  Negative for dysuria and hematuria.        Personal History     Past Medical History:   Diagnosis Date    Arthritis     Cervical disc disease     Chronic neck pain     Elevated PSA     Enlarged prostate     Hearing loss     History of transfusion     Hyperlipidemia     Hypertension     Shoulder dislocation     LEFT SHOULDER EASILY DISLOCATED    Spinal headache     UTI (urinary tract infection)        Past Surgical History:   Procedure Laterality Date    BACK SURGERY      COLONOSCOPY      FEMUR IM NAILING/RODDING Left 2018    Procedure: FEMUR INTRAMEDULLARY NAILING/RODDING;  Surgeon: Hilario Owen MD;  Location:  [facility] OR;  Service: Orthopedics    PROSTATE BIOPSY      PROSTATE BIOPSY N/A 06/10/2019    Procedure: PROSTATE ULTRASOUND BIOPSY MRI FUSION WITH URONAV;  Surgeon: Shankar Wang MD;  Location: [facility] OR;  Service: Urology    TONSILLECTOMY         Family History: His family history includes Cancer in his mother; Heart disease in his father; Hyperlipidemia in his father; Hypertension in his father.     Social History: He  reports that he has never smoked. He has never used smokeless tobacco. He reports current alcohol use of about 1.0 standard drink of alcohol per week. He reports that he does not use drugs.    Home Medications:  Biotin, Cholecalciferol, Lutein, Misc Natural Products,  Testosterone, atorvastatin, lisinopril, tadalafil, and tamsulosin    Allergies:  He is allergic to amoxicillin, azithromycin, cephalosporins, and sulfa antibiotics.    Objective    Objective     Vitals:    Temp:  [96.5 °F (35.8 °C)] 96.5 °F (35.8 °C)  Heart Rate:  [53-57] 57  Resp:  [16] 16  BP: (178)/(82) 178/82    Physical Exam  Vitals reviewed.   Constitutional:       General: He is not in acute distress.     Appearance: He is well-developed. He is not diaphoretic.   Pulmonary:      Effort: Pulmonary effort is normal.   Abdominal:      General: There is no distension.      Palpations: Abdomen is soft. There is no mass.      Tenderness: There is no abdominal tenderness. There is no guarding or rebound.      Hernia: No hernia is present.   Skin:     General: Skin is warm and dry.   Neurological:      Mental Status: He is alert and oriented to person, place, and time.         Assessment & Plan   Assessment / Plan     Brief Patient Summary:  Michele Harvey is a 73 y.o. male who presents for preoperative evaluation.    Pre-Op Diagnosis Codes:     * BPH with obstruction/lower urinary tract symptoms [N40.1, N13.8]    Active Hospital Problems:  Active Hospital Problems    Diagnosis     **BPH with obstruction/lower urinary tract symptoms      Plan:   Procedure(s):  TRANSURETHRAL PROSTATE AQUABLATION    The risks, benefits, and alternatives of the procedure including but not limited to SHASHI, ED, UTI, urethral stricture, need for blood transfusion and risks of the anesthesia were discussed in detail with the patient and questions were answered. No guarantees were made or implied. Informed consent was obtained.    Olvin Allen MD

## 2023-12-07 NOTE — PLAN OF CARE
Goal Outcome Evaluation:   Received from PACU. Denies need for pharmacological pain interventions. F/C to BSD with clear light red urine return CBI at moderate flow rate. VSS. Safety maintained.

## 2023-12-07 NOTE — ANESTHESIA PREPROCEDURE EVALUATION
Anesthesia Evaluation     Patient summary reviewed   no history of anesthetic complications:   NPO Solid Status: > 8 hours  NPO Liquid Status: > 8 hours           Airway   TM distance: >3 FB  No difficulty expected  Dental - normal exam     Pulmonary    (-) asthma, sleep apnea, not a smoker  Cardiovascular   Exercise tolerance: good (4-7 METS)    (+) hypertension, hyperlipidemia  (-) past MI      Neuro/Psych  (-) seizures, TIA, CVA  GI/Hepatic/Renal/Endo    (-) liver disease, no renal disease, diabetes    Musculoskeletal     Abdominal    Substance History      OB/GYN          Other        ROS/Med Hx Other: Elevated psa              Anesthesia Plan    ASA 2     general     intravenous induction     Anesthetic plan, risks, benefits, and alternatives have been provided, discussed and informed consent has been obtained with: patient.    CODE STATUS:

## 2023-12-07 NOTE — ANESTHESIA POSTPROCEDURE EVALUATION
Patient: Michele Harvey    Procedure Summary       Date: 12/07/23 Room / Location:  [facility] OR 08 /  [facility] OR    Anesthesia Start: 0837 Anesthesia Stop: 1004    Procedure: TRANSURETHRAL PROSTATE AQUABLATION (Bladder) Diagnosis:       BPH with obstruction/lower urinary tract symptoms      (BPH with obstruction/lower urinary tract symptoms [N40.1, N13.8])    Surgeons: Olvin Allen MD Provider: Shankar Rico CRNA    Anesthesia Type: general ASA Status: 2            Anesthesia Type: general    Vitals  Vitals Value Taken Time   /72 12/07/23 1112   Temp 97.3 °F (36.3 °C) 12/07/23 1110   Pulse 65 12/07/23 1121   Resp 12 12/07/23 1110   SpO2 94 % 12/07/23 1121   Vitals shown include unfiled device data.        Post Anesthesia Care and Evaluation    Patient location during evaluation: PACU  Patient participation: complete - patient participated  Level of consciousness: awake and awake and alert  Pain score: 0  Pain management: adequate    Airway patency: patent  Anesthetic complications: No anesthetic complications  PONV Status: none  Cardiovascular status: acceptable  Respiratory status: acceptable  Hydration status: acceptable    Comments: Patient discharged according to acceptable Jason score per RN assessment. See nursing records for further information.     Blood pressure 143/71, pulse 65, temperature 94.3 °F (34.6 °C), temperature source Oral, resp. rate 12, SpO2 94%.

## 2023-12-07 NOTE — OP NOTE
Operative Summary    Michele Hravey  Date of Procedure: 12/7/2023    Pre-op Diagnosis:   BPH with obstruction/lower urinary tract symptoms [N40.1, N13.8]    Post-op Diagnosis:     Post-Op Diagnosis Codes:     * BPH with obstruction/lower urinary tract symptoms [N40.1, N13.8]    Procedure/CPT® Codes:      Procedure(s):  TRANSURETHRAL PROSTATE AQUABLATION    Surgeon(s):  Olvin Allen MD    Anesthesia: General    Staff:   Circulator: Concetta Vaughan RN  Scrub Person: Nash Castellanos; Emperatriz Lara  Vendor Representative: Pio Lafleur    Indications for procedure:  BPH with lower urinary tract symptoms    Findings:   50 g prostate  First passed 3 minutes 4 seconds second pass 2 minutes 58 seconds third pass 2 minutes 58 seconds  No orifice injury  Wide open prostatic fossa conclusion  No external sphincter injury    Procedure details:  After appropriate anesthesia, positioning, prep and drape, timeout protocol was observed.     The TRUS (transrectal ultrasound) Stepper was mounted to the articulating arm and secured to the operating room bed.  The ultrasound probe was attached to the stepper.  The ultrasound probe was aligned, and confirmation made that the prostate is centered and aligned using both transverse and sagittal views.  The bladder neck, the verumontanum, and the central/transition zones are identified.    The 24F AQUABEAM Handpiece was inserted into the prostatic urethra and a complete cystoscopic evaluation was performed by inspecting the prostate, bladder, and identifying the location of the verumontanum/external sphincter.  The AQUABEAM handpiece was secured to the handpiece articulating arm.  This point I confirmed that the alignment of the AQUABEAM handpiece and in the transrectal sound probe were parallel and linear.  Confirmation that the AQUABEAM nozzle is centered and anterior at the bladder neck .  The cystoscope was then retracted to visualize the verumontanum the external  "sphincter.  The tip of the cystoscope was positioned just proximal to the external sphincter.  I then reconfirm the alignment of the TRUS probe with the AQUABEAM handpiece and compression was applied with the TRUS probe to improve imaging of the prostate.  At this point horizontal alignment of the handpiece water jet nozzle was performed.    The AQUABLATION treatment zones were planned utilizing real-time transrectal ultrasound to visualize the contour of the prostate, the depth and radial angles of resection as defined in the transverse view.  In the sagittal view, the aquablation nozzle was identified in position and registered with the software.  The treatment contours were then adjusted to conform to the intended resection margins.  Any median lobe tissue, bladder neck and verumontanum were marked and confirmed in the treatment contour.    The aquablation treatment is then started following resection contour confirmed under ultrasound guidance.  Adjustments were made as needed with regard to power of the water jet.  Total aquablation resection time was 9 minutes.     Once the aquablation resection was complete, cystoscopy is carried out again.  Tissue and clot evacuation were performed using a 27 French ACMI cystoscope/resectoscope until effluent was clear.  Cautery was used for hemostasis.  This required resection of some of the \"fluffy \"tissue ablated by aquablation.    This point a 24 French three-way Riley catheter with 60 cc in the balloon is confirmed in its position by TRUS.  Patient within the transfer to the recovery room    Estimated Blood Loss: 50 cc    Specimens:                Specimens       ID Source Type Tests Collected By Collected At Frozen?    A Prostate Tissue TISSUE PATHOLOGY EXAM   Olvin Allen MD 12/7/23 2821     Description: PROSTATE CHIPS              Drains: 24 French three-way Riley catheter    Complications: none    Plan: PACU to floor on continuous bladder " irrigation      (Please note that portions of this note were completed with a voice recognition program.)  Olvin Allen MD     Date: 12/7/2023  Time: 10:01 CST

## 2023-12-07 NOTE — ANESTHESIA PROCEDURE NOTES
Airway  Urgency: elective    Date/Time: 12/7/2023 8:42 AM    General Information and Staff    Patient location during procedure: OR  CRNA/CAA: Shankar Rico CRNA    Indications and Patient Condition  Indications for airway management: airway protection    Preoxygenated: yes  MILS maintained throughout  Mask difficulty assessment: 0 - not attempted    Final Airway Details  Final airway type: endotracheal airway      Successful airway: ETT  Cuffed: yes   Successful intubation technique: direct laryngoscopy  Endotracheal tube insertion site: oral  Blade: Fabian  Blade size: 3.5  ETT size (mm): 7.5  Cormack-Lehane Classification: grade I - full view of glottis  Placement verified by: chest auscultation and capnometry   Cuff volume (mL): 7  Measured from: teeth  ETT/EBT  to teeth (cm): 23  Number of attempts at approach: 1  Assessment: lips, teeth, and gum same as pre-op and atraumatic intubation

## 2023-12-08 VITALS
BODY MASS INDEX: 28.95 KG/M2 | OXYGEN SATURATION: 99 % | DIASTOLIC BLOOD PRESSURE: 69 MMHG | WEIGHT: 206.79 LBS | RESPIRATION RATE: 16 BRPM | HEART RATE: 56 BPM | HEIGHT: 71 IN | TEMPERATURE: 97.8 F | SYSTOLIC BLOOD PRESSURE: 131 MMHG

## 2023-12-08 LAB
DEPRECATED RDW RBC AUTO: 44.1 FL (ref 37–54)
ERYTHROCYTE [DISTWIDTH] IN BLOOD BY AUTOMATED COUNT: 13.9 % (ref 12.3–15.4)
HCT VFR BLD AUTO: 34 % (ref 37.5–51)
HGB BLD-MCNC: 11.1 G/DL (ref 13–17.7)
MCH RBC QN AUTO: 28.3 PG (ref 26.6–33)
MCHC RBC AUTO-ENTMCNC: 32.6 G/DL (ref 31.5–35.7)
MCV RBC AUTO: 86.7 FL (ref 79–97)
PLATELET # BLD AUTO: 164 10*3/MM3 (ref 140–450)
PMV BLD AUTO: 10.5 FL (ref 6–12)
RBC # BLD AUTO: 3.92 10*6/MM3 (ref 4.14–5.8)
WBC NRBC COR # BLD AUTO: 8.77 10*3/MM3 (ref 3.4–10.8)

## 2023-12-08 PROCEDURE — 99238 HOSP IP/OBS DSCHRG MGMT 30/<: CPT | Performed by: UROLOGY

## 2023-12-08 PROCEDURE — A9270 NON-COVERED ITEM OR SERVICE: HCPCS | Performed by: UROLOGY

## 2023-12-08 PROCEDURE — 85027 COMPLETE CBC AUTOMATED: CPT | Performed by: UROLOGY

## 2023-12-08 PROCEDURE — 63710000001 OXYBUTYNIN XL 5 MG TABLET SUSTAINED-RELEASE 24 HOUR: Performed by: UROLOGY

## 2023-12-08 RX ORDER — HYOSCYAMINE SULFATE 0.12 MG/1
0.12 TABLET SUBLINGUAL EVERY 4 HOURS PRN
Qty: 21 EACH | Refills: 1 | Status: SHIPPED | OUTPATIENT
Start: 2023-12-08

## 2023-12-08 RX ORDER — CIPROFLOXACIN 500 MG/1
500 TABLET, FILM COATED ORAL 2 TIMES DAILY
Qty: 6 TABLET | Refills: 0 | Status: SHIPPED | OUTPATIENT
Start: 2023-12-08

## 2023-12-08 RX ADMIN — OXYBUTYNIN CHLORIDE 5 MG: 5 TABLET, EXTENDED RELEASE ORAL at 08:37

## 2023-12-08 NOTE — PLAN OF CARE
Goal Outcome Evaluation:           Progress: no change  Outcome Evaluation: Pt denies pain, khan cath in place light pink clear output, CBI at slow rate, up with assist, elmira diet

## 2023-12-08 NOTE — DISCHARGE INSTRUCTIONS
No ejaculating for 4 weeks  No driving with the catheter in  Do not lift greater than 20 pounds for 2 weeks  Call MD for temp greater than  101.5, ability to urinate, passing large blood clots in the urine

## 2023-12-08 NOTE — DISCHARGE SUMMARY
Date of Discharge:  12/8/2023    Discharge Diagnosis: BPH with urinary obstruction    Presenting Problem/History of Present Illness  BPH with obstruction/lower urinary tract symptoms [N40.1, N13.8]  BPH with urinary obstruction [N40.1, N13.8]       Hospital Course  Patient was admitted on 12/7/2023 underwent aqua ablation of his prostate.  Patient did well postoperatively.  He was transferred to the floor.  He had no evidence of acute blood loss anemia.  His urine was clear.  CBI was clamped.  He received appropriate Riley care catheter teaching.  He was discharged home.      Procedures Performed  Procedure(s):  TRANSURETHRAL PROSTATE AQUABLATION       Consults:   Consults       No orders found for last 30 day(s).              Condition on Discharge: Good    Vital Signs  Temp:  [94.3 °F (34.6 °C)-98 °F (36.7 °C)] 97.7 °F (36.5 °C)  Heart Rate:  [54-73] 66  Resp:  [12-18] 16  BP: (100-154)/(49-78) 146/67    Discharge Disposition  Home or Self Care    Discharge Medications     Discharge Medications        New Medications        Instructions Start Date   ciprofloxacin 500 MG tablet  Commonly known as: Cipro   500 mg, Oral, 2 Times Daily, Start day prior to cath removal      Hyoscyamine Sulfate SL 0.125 MG sublingual tablet  Commonly known as: Levsin/SL   0.125 mg, Oral, Every 4 Hours PRN             Continue These Medications        Instructions Start Date   atorvastatin 40 MG tablet  Commonly known as: LIPITOR   40 mg, Oral, Daily      BIOTIN 5000 PO   1 tablet, Oral, Daily      LUTEIN PO   20 mg, Oral, Daily      TADALAFIL (PAH) PO   10 mg, Oral, Every 24 Hours Scheduled, ORDERED FOR EFFECT ON PROSTATE BY PCP      Vitamin D-3 125 MCG (5000 UT) tablet   1 tablet, Oral, Daily             Stop These Medications      PROSTATE HEALTH PO     tamsulosin 0.4 MG capsule 24 hr capsule  Commonly known as: FLOMAX              Discharge Diet:     Activity at Discharge:     Follow-up Appointments  No future appointments.    6  jayson Allen  Day nursing visit for cath removal  Test Results Pending at Discharge  Pending Labs       Order Current Status    Tissue Pathology Exam In process             Olvin Allen MD  12/08/23  07:59 CST    Time:  Less than 30 minutes

## 2023-12-11 ENCOUNTER — PROCEDURE VISIT (OUTPATIENT)
Dept: UROLOGY | Facility: CLINIC | Age: 73
End: 2023-12-11
Payer: MEDICARE

## 2023-12-11 DIAGNOSIS — N40.1 BPH WITH OBSTRUCTION/LOWER URINARY TRACT SYMPTOMS: Primary | ICD-10-CM

## 2023-12-11 DIAGNOSIS — N13.8 BPH WITH OBSTRUCTION/LOWER URINARY TRACT SYMPTOMS: Primary | ICD-10-CM

## 2023-12-11 LAB
CYTO UR: NORMAL
LAB AP CASE REPORT: NORMAL
LAB AP SPECIAL STAINS: NORMAL
Lab: NORMAL
PATH REPORT.FINAL DX SPEC: NORMAL
PATH REPORT.GROSS SPEC: NORMAL

## 2023-12-11 PROCEDURE — 99211 OFF/OP EST MAY X REQ PHY/QHP: CPT

## 2023-12-11 NOTE — PROGRESS NOTES
Michele Harvey is a 73 y.o. male who is here today for catheter removal. Patient of Dr. Allen. 10cc syringe used to deflate the balloon and the catheter was removed without difficulty.  The patient tolerated this well and will return on 1/22 to see Dr. Allen in the office for follow up. Elliemelina STEARNS was in the office at the time of procedure.     Patient was advised to drink clear fluids for the next couple hours and urinate. The patient was also advised he may experience some blood in the urine and burning with urination for the next couple days. If the patient is unable to urinate or develops fever, chills, N&V or suprapubic pain he will call to return for an appt at clinic or seek medical treatment at [facility], PCP or Urgent Care after hours. Patient verbalized understanding and all questions were answered.   Alina TRACY have reviewed and agree with medical assistance documentation above

## 2023-12-13 SDOH — ECONOMIC STABILITY: TRANSPORTATION INSECURITY
IN THE PAST 12 MONTHS, HAS LACK OF TRANSPORTATION KEPT YOU FROM MEETINGS, WORK, OR FROM GETTING THINGS NEEDED FOR DAILY LIVING?: NO

## 2023-12-13 SDOH — ECONOMIC STABILITY: FOOD INSECURITY: WITHIN THE PAST 12 MONTHS, YOU WORRIED THAT YOUR FOOD WOULD RUN OUT BEFORE YOU GOT MONEY TO BUY MORE.: NEVER TRUE

## 2023-12-13 SDOH — ECONOMIC STABILITY: INCOME INSECURITY: HOW HARD IS IT FOR YOU TO PAY FOR THE VERY BASICS LIKE FOOD, HOUSING, MEDICAL CARE, AND HEATING?: NOT HARD AT ALL

## 2023-12-13 SDOH — ECONOMIC STABILITY: HOUSING INSECURITY
IN THE LAST 12 MONTHS, WAS THERE A TIME WHEN YOU DID NOT HAVE A STEADY PLACE TO SLEEP OR SLEPT IN A SHELTER (INCLUDING NOW)?: NO

## 2023-12-13 SDOH — ECONOMIC STABILITY: FOOD INSECURITY: WITHIN THE PAST 12 MONTHS, THE FOOD YOU BOUGHT JUST DIDN'T LAST AND YOU DIDN'T HAVE MONEY TO GET MORE.: NEVER TRUE

## 2023-12-14 ENCOUNTER — OFFICE VISIT (OUTPATIENT)
Dept: FAMILY MEDICINE CLINIC | Age: 73
End: 2023-12-14
Payer: COMMERCIAL

## 2023-12-14 VITALS
DIASTOLIC BLOOD PRESSURE: 72 MMHG | OXYGEN SATURATION: 97 % | SYSTOLIC BLOOD PRESSURE: 126 MMHG | BODY MASS INDEX: 27.9 KG/M2 | TEMPERATURE: 97.5 F | HEART RATE: 65 BPM | HEIGHT: 72 IN | WEIGHT: 206 LBS

## 2023-12-14 DIAGNOSIS — N40.1 BENIGN PROSTATIC HYPERPLASIA WITH WEAK URINARY STREAM: Primary | ICD-10-CM

## 2023-12-14 DIAGNOSIS — R39.12 BENIGN PROSTATIC HYPERPLASIA WITH WEAK URINARY STREAM: Primary | ICD-10-CM

## 2023-12-14 DIAGNOSIS — Z90.79 S/P TURP: ICD-10-CM

## 2023-12-14 PROCEDURE — 3078F DIAST BP <80 MM HG: CPT | Performed by: NURSE PRACTITIONER

## 2023-12-14 PROCEDURE — 3074F SYST BP LT 130 MM HG: CPT | Performed by: NURSE PRACTITIONER

## 2023-12-14 PROCEDURE — G8484 FLU IMMUNIZE NO ADMIN: HCPCS | Performed by: NURSE PRACTITIONER

## 2023-12-14 PROCEDURE — 1123F ACP DISCUSS/DSCN MKR DOCD: CPT | Performed by: NURSE PRACTITIONER

## 2023-12-14 PROCEDURE — G8417 CALC BMI ABV UP PARAM F/U: HCPCS | Performed by: NURSE PRACTITIONER

## 2023-12-14 PROCEDURE — G8427 DOCREV CUR MEDS BY ELIG CLIN: HCPCS | Performed by: NURSE PRACTITIONER

## 2023-12-14 PROCEDURE — 3017F COLORECTAL CA SCREEN DOC REV: CPT | Performed by: NURSE PRACTITIONER

## 2023-12-14 PROCEDURE — 99213 OFFICE O/P EST LOW 20 MIN: CPT | Performed by: NURSE PRACTITIONER

## 2023-12-14 PROCEDURE — 1036F TOBACCO NON-USER: CPT | Performed by: NURSE PRACTITIONER

## 2024-01-08 ENCOUNTER — TELEPHONE (OUTPATIENT)
Dept: UROLOGY | Facility: CLINIC | Age: 74
End: 2024-01-08
Payer: MEDICARE

## 2024-01-08 NOTE — TELEPHONE ENCOUNTER
Patient called in stating he had aquablation done on 12/7/2023 and he wanted to know if he could go to the gym and if he could have sexual activity.  As for his sexual activity I told him that his 4 weeks was up so he could ejaculate. But as far as the gym goes I advised the patient to wait til after his follow up with Dr. Phelps on 1/22/2024. And if Dr. Allen felt patient could be released to do those activities he would let the patient know. Patient verbalized understanding.

## 2024-01-11 NOTE — PROGRESS NOTES
Subjective    Mr. Harvey is 73 y.o. male    Chief Complaint: Post Op Aquablation     History of Present Illness  Patient is status post aqua ablation 12/7/2023.  Patient previously had issues with urinary tract infections as well as urinary retention.  Patient was on Flomax prior to surgery. Patient with gross hematuria today.  AUA 10/35 with incomplete emptying, frequency, intermittency, urgency, weak stream, nocturia X 2. Occasional dysuria. Denies retrograde ejaculation.    The following portions of the patient's history were reviewed and updated as appropriate: allergies, current medications, past family history, past medical history, past social history, past surgical history and problem list.    Review of Systems      Current Outpatient Medications:     atorvastatin (LIPITOR) 40 MG tablet, Take 1 tablet by mouth Daily., Disp: , Rfl:     BIOTIN 5000 PO, Take 1 tablet by mouth Daily., Disp: , Rfl:     Cholecalciferol (Vitamin D-3) 125 MCG (5000 UT) tablet, Take 1 tablet by mouth Daily., Disp: , Rfl:     lisinopril (PRINIVIL,ZESTRIL) 10 MG tablet, Take 1 tablet by mouth 2 (Two) Times a Day., Disp: , Rfl:     LUTEIN PO, Take 20 mg by mouth Daily., Disp: , Rfl:     tadalafil (CIALIS) 10 MG tablet, Take 1 tablet by mouth Daily As Needed for Erectile Dysfunction., Disp: , Rfl:     Past Medical History:   Diagnosis Date    Arthritis     Cervical disc disease     Chronic neck pain     Elevated PSA     Enlarged prostate     Hearing loss     History of transfusion     Hyperlipidemia     Hypertension     Shoulder dislocation     LEFT SHOULDER EASILY DISLOCATED    Spinal headache     UTI (urinary tract infection)        Past Surgical History:   Procedure Laterality Date    BACK SURGERY      COLONOSCOPY      FEMUR IM NAILING/RODDING Left 05/21/2018    Procedure: FEMUR INTRAMEDULLARY NAILING/RODDING;  Surgeon: Hilario Owen MD;  Location: [facility] OR;  Service: Orthopedics    PROSTATE AQUABLATION N/A 12/7/2023     "Procedure: TRANSURETHRAL PROSTATE AQUABLATION;  Surgeon: Olvin Allen MD;  Location:  [facility] OR;  Service: Robotics - Urology;  Laterality: N/A;    PROSTATE BIOPSY      PROSTATE BIOPSY N/A 06/10/2019    Procedure: PROSTATE ULTRASOUND BIOPSY MRI FUSION WITH URONAV;  Surgeon: Shankar Wang MD;  Location:  [facility] OR;  Service: Urology    TONSILLECTOMY         Social History     Socioeconomic History    Marital status:    Tobacco Use    Smoking status: Never    Smokeless tobacco: Never   Vaping Use    Vaping Use: Never used   Substance and Sexual Activity    Alcohol use: Yes     Alcohol/week: 1.0 standard drink of alcohol     Types: 1 Cans of beer per week     Comment: only occasionally    Drug use: No    Sexual activity: Yes     Partners: Female     Birth control/protection: None       Family History   Problem Relation Age of Onset    Cancer Mother     Heart disease Father     Hyperlipidemia Father     Hypertension Father        Objective    Temp 97.9 °F (36.6 °C)   Ht 180 cm (70.87\")   Wt 93.4 kg (206 lb)   BMI 28.84 kg/m²     Physical Exam        Results for orders placed or performed in visit on 01/22/24   POC Urinalysis Dipstick, Multipro    Specimen: Urine   Result Value Ref Range    Color Yellow Yellow, Straw, Dark Yellow, Teresa    Clarity, UA Cloudy (A) Clear    Glucose, UA Negative Negative mg/dL    Bilirubin Negative Negative    Ketones, UA Negative Negative    Specific Gravity  1.025 1.005 - 1.030    Blood, UA Moderate (A) Negative    pH, Urine 5.5 5.0 - 8.0    Protein, POC 30 mg/dL (A) Negative mg/dL    Urobilinogen, UA 0.2 E.U./dL Normal, 0.2 E.U./dL    Nitrite, UA Negative Negative    Leukocytes Small (1+) (A) Negative   Microscopic Urinalysis  I inspected the urine myself based on the clinical situation including the dipstick urine. The urine is spun in a centrifuge for three minutes. The spun urine shows 20-50 rbc/hpf, 3-6 wbc/hpf, none epi/hpf, trace bacteria, negative crystals, " and negative casts.     IPSS Questionnaire (AUA-7):  Incomplete emptying  Over the past month, how often have you had a sensation of not emptying your bladder completely after you finished urinating?: Less than half the time (01/22/24 1442)  Frequency  Over the past month, how often have you had to urinate again less than two hours after you finishied urinating ?: Less than half the time (01/22/24 1442)  Intermittency  Over the past month, how often have you found you stopped and started again several time when you urinated ?: Less than 1 time in 5 (01/22/24 1442)  Urgency  Over the last month, how often have you found it difficult  have you found it difficult to postpone urination ?: Less than half the time (01/22/24 1442)  Weak Stream  Over the past month, how often have you had a weak urinary stream ?: Less than half the time (01/22/24 1442)  Straining  Over the past month, how often have you had to push or strain to begin urination ?: Not at all (01/22/24 1442)  Nocturia  Over the past month, how many times did you most typically get up to urinate from the time you went to bed until the time you got up in the morning ?: 1 time (01/22/24 1442)  Quality of life due to urinary symptoms  If you were to spend the rest of your life with your urinary condition the way it is now, how would feel about that?: Pleased (01/22/24 1442)    Scores  Total IPSS Score: (!) 10 (01/22/24 1442)  Total Score = Symptomatic Level: Moderately symptomatic: 8-19 (01/22/24 1442)        Estimation of residual urine via abdominal ultrasound  Residual Urine: 2 ml  Indication: BPH  Position: Supine  Examination: Incremental scanning of the suprapubic area using 3 MHz transducer using copious amounts of acoustic gel.   Findings: An anechoic area was demonstrated which represented the bladder, with measurement of residual urine as noted. I inspected this myself. In that the residual urine was stable or insignificant, no treatment will be  necessary at this time.       Assessment and Plan    Diagnoses and all orders for this visit:    1. BPH with obstruction/lower urinary tract symptoms (Primary)  -     POC Urinalysis Dipstick, Multipro    2. Pyuria  -     Urine Culture - Urine, Urine, Clean Catch      Will send today's urine for culture.  Will call with those results.  He is doing very well from a urinary standpoint following aqua ablation.  Will have him follow-up as needed.

## 2024-01-22 ENCOUNTER — OFFICE VISIT (OUTPATIENT)
Dept: UROLOGY | Facility: CLINIC | Age: 74
End: 2024-01-22
Payer: MEDICARE

## 2024-01-22 VITALS — WEIGHT: 206 LBS | TEMPERATURE: 97.9 F | BODY MASS INDEX: 28.84 KG/M2 | HEIGHT: 71 IN

## 2024-01-22 DIAGNOSIS — N40.1 BPH WITH OBSTRUCTION/LOWER URINARY TRACT SYMPTOMS: Primary | ICD-10-CM

## 2024-01-22 DIAGNOSIS — N13.8 BPH WITH OBSTRUCTION/LOWER URINARY TRACT SYMPTOMS: Primary | ICD-10-CM

## 2024-01-22 DIAGNOSIS — R82.81 PYURIA: ICD-10-CM

## 2024-01-22 LAB
BILIRUB BLD-MCNC: NEGATIVE MG/DL
CLARITY, POC: ABNORMAL
COLOR UR: YELLOW
GLUCOSE UR STRIP-MCNC: NEGATIVE MG/DL
KETONES UR QL: NEGATIVE
LEUKOCYTE EST, POC: ABNORMAL
NITRITE UR-MCNC: NEGATIVE MG/ML
PH UR: 5.5 [PH] (ref 5–8)
PROT UR STRIP-MCNC: ABNORMAL MG/DL
RBC # UR STRIP: ABNORMAL /UL
SP GR UR: 1.02 (ref 1–1.03)
UROBILINOGEN UR QL: ABNORMAL

## 2024-01-22 PROCEDURE — 99213 OFFICE O/P EST LOW 20 MIN: CPT | Performed by: UROLOGY

## 2024-01-22 PROCEDURE — 1159F MED LIST DOCD IN RCRD: CPT | Performed by: UROLOGY

## 2024-01-22 PROCEDURE — 51798 US URINE CAPACITY MEASURE: CPT | Performed by: UROLOGY

## 2024-01-22 PROCEDURE — 81001 URINALYSIS AUTO W/SCOPE: CPT | Performed by: UROLOGY

## 2024-01-22 PROCEDURE — 87086 URINE CULTURE/COLONY COUNT: CPT | Performed by: UROLOGY

## 2024-01-22 PROCEDURE — 1160F RVW MEDS BY RX/DR IN RCRD: CPT | Performed by: UROLOGY

## 2024-01-22 RX ORDER — LISINOPRIL 10 MG/1
10 TABLET ORAL 2 TIMES DAILY
COMMUNITY

## 2024-01-22 RX ORDER — TADALAFIL 10 MG/1
10 TABLET ORAL DAILY PRN
COMMUNITY
Start: 2023-12-04

## 2024-01-23 LAB — BACTERIA SPEC AEROBE CULT: NO GROWTH

## 2024-03-14 ENCOUNTER — NURSE ONLY (OUTPATIENT)
Dept: FAMILY MEDICINE CLINIC | Age: 74
End: 2024-03-14

## 2024-03-14 DIAGNOSIS — Z13.9 SCREENING DUE: Primary | ICD-10-CM

## 2024-03-14 SDOH — ECONOMIC STABILITY: FOOD INSECURITY: WITHIN THE PAST 12 MONTHS, THE FOOD YOU BOUGHT JUST DIDN'T LAST AND YOU DIDN'T HAVE MONEY TO GET MORE.: NEVER TRUE

## 2024-03-14 SDOH — ECONOMIC STABILITY: INCOME INSECURITY: HOW HARD IS IT FOR YOU TO PAY FOR THE VERY BASICS LIKE FOOD, HOUSING, MEDICAL CARE, AND HEATING?: NOT HARD AT ALL

## 2024-03-14 SDOH — ECONOMIC STABILITY: FOOD INSECURITY: WITHIN THE PAST 12 MONTHS, YOU WORRIED THAT YOUR FOOD WOULD RUN OUT BEFORE YOU GOT MONEY TO BUY MORE.: NEVER TRUE

## 2024-03-22 SDOH — HEALTH STABILITY: PHYSICAL HEALTH: ON AVERAGE, HOW MANY DAYS PER WEEK DO YOU ENGAGE IN MODERATE TO STRENUOUS EXERCISE (LIKE A BRISK WALK)?: 2 DAYS

## 2024-03-22 SDOH — HEALTH STABILITY: PHYSICAL HEALTH: ON AVERAGE, HOW MANY MINUTES DO YOU ENGAGE IN EXERCISE AT THIS LEVEL?: 70 MIN

## 2024-03-22 ASSESSMENT — LIFESTYLE VARIABLES
HOW MANY STANDARD DRINKS CONTAINING ALCOHOL DO YOU HAVE ON A TYPICAL DAY: 1
HOW OFTEN DO YOU HAVE A DRINK CONTAINING ALCOHOL: 3
HOW MANY STANDARD DRINKS CONTAINING ALCOHOL DO YOU HAVE ON A TYPICAL DAY: 1 OR 2
HOW OFTEN DO YOU HAVE A DRINK CONTAINING ALCOHOL: 2-4 TIMES A MONTH
HOW OFTEN DO YOU HAVE SIX OR MORE DRINKS ON ONE OCCASION: 1

## 2024-03-22 ASSESSMENT — PATIENT HEALTH QUESTIONNAIRE - PHQ9
1. LITTLE INTEREST OR PLEASURE IN DOING THINGS: NOT AT ALL
SUM OF ALL RESPONSES TO PHQ QUESTIONS 1-9: 0
SUM OF ALL RESPONSES TO PHQ QUESTIONS 1-9: 0
2. FEELING DOWN, DEPRESSED OR HOPELESS: NOT AT ALL
SUM OF ALL RESPONSES TO PHQ QUESTIONS 1-9: 0
SUM OF ALL RESPONSES TO PHQ9 QUESTIONS 1 & 2: 0
SUM OF ALL RESPONSES TO PHQ QUESTIONS 1-9: 0

## 2024-03-25 ENCOUNTER — OFFICE VISIT (OUTPATIENT)
Dept: FAMILY MEDICINE CLINIC | Age: 74
End: 2024-03-25
Payer: COMMERCIAL

## 2024-03-25 VITALS
OXYGEN SATURATION: 98 % | BODY MASS INDEX: 28.68 KG/M2 | HEART RATE: 50 BPM | SYSTOLIC BLOOD PRESSURE: 136 MMHG | HEIGHT: 72 IN | DIASTOLIC BLOOD PRESSURE: 68 MMHG | WEIGHT: 211.75 LBS | TEMPERATURE: 96.9 F

## 2024-03-25 DIAGNOSIS — R39.11 BENIGN PROSTATIC HYPERPLASIA WITH URINARY HESITANCY: ICD-10-CM

## 2024-03-25 DIAGNOSIS — R79.89 LOW TESTOSTERONE IN MALE: ICD-10-CM

## 2024-03-25 DIAGNOSIS — Z00.00 INITIAL MEDICARE ANNUAL WELLNESS VISIT: Primary | ICD-10-CM

## 2024-03-25 DIAGNOSIS — E78.2 MIXED HYPERLIPIDEMIA: ICD-10-CM

## 2024-03-25 DIAGNOSIS — I10 PRIMARY HYPERTENSION: ICD-10-CM

## 2024-03-25 DIAGNOSIS — N40.1 BENIGN PROSTATIC HYPERPLASIA WITH URINARY HESITANCY: ICD-10-CM

## 2024-03-25 DIAGNOSIS — R73.03 PREDIABETES: ICD-10-CM

## 2024-03-25 DIAGNOSIS — R53.83 FATIGUE, UNSPECIFIED TYPE: ICD-10-CM

## 2024-03-25 PROBLEM — N18.31 STAGE 3A CHRONIC KIDNEY DISEASE (HCC): Status: RESOLVED | Noted: 2022-09-21 | Resolved: 2024-03-25

## 2024-03-25 PROCEDURE — 1036F TOBACCO NON-USER: CPT | Performed by: PEDIATRICS

## 2024-03-25 PROCEDURE — 3075F SYST BP GE 130 - 139MM HG: CPT | Performed by: PEDIATRICS

## 2024-03-25 PROCEDURE — G8484 FLU IMMUNIZE NO ADMIN: HCPCS | Performed by: PEDIATRICS

## 2024-03-25 PROCEDURE — 99214 OFFICE O/P EST MOD 30 MIN: CPT | Performed by: PEDIATRICS

## 2024-03-25 PROCEDURE — 3078F DIAST BP <80 MM HG: CPT | Performed by: PEDIATRICS

## 2024-03-25 PROCEDURE — 1123F ACP DISCUSS/DSCN MKR DOCD: CPT | Performed by: PEDIATRICS

## 2024-03-25 PROCEDURE — G8417 CALC BMI ABV UP PARAM F/U: HCPCS | Performed by: PEDIATRICS

## 2024-03-25 PROCEDURE — 3017F COLORECTAL CA SCREEN DOC REV: CPT | Performed by: PEDIATRICS

## 2024-03-25 PROCEDURE — G8427 DOCREV CUR MEDS BY ELIG CLIN: HCPCS | Performed by: PEDIATRICS

## 2024-03-25 PROCEDURE — G0438 PPPS, INITIAL VISIT: HCPCS | Performed by: PEDIATRICS

## 2024-03-25 ASSESSMENT — LIFESTYLE VARIABLES
HOW MANY STANDARD DRINKS CONTAINING ALCOHOL DO YOU HAVE ON A TYPICAL DAY: 1 OR 2
HOW OFTEN DO YOU HAVE A DRINK CONTAINING ALCOHOL: 2-4 TIMES A MONTH

## 2024-03-25 ASSESSMENT — PATIENT HEALTH QUESTIONNAIRE - PHQ9
2. FEELING DOWN, DEPRESSED OR HOPELESS: NOT AT ALL
SUM OF ALL RESPONSES TO PHQ QUESTIONS 1-9: 0
SUM OF ALL RESPONSES TO PHQ QUESTIONS 1-9: 0
1. LITTLE INTEREST OR PLEASURE IN DOING THINGS: NOT AT ALL
SUM OF ALL RESPONSES TO PHQ9 QUESTIONS 1 & 2: 0
SUM OF ALL RESPONSES TO PHQ QUESTIONS 1-9: 0
SUM OF ALL RESPONSES TO PHQ QUESTIONS 1-9: 0

## 2024-03-25 ASSESSMENT — ENCOUNTER SYMPTOMS
EYES NEGATIVE: 1
ALLERGIC/IMMUNOLOGIC NEGATIVE: 1
RESPIRATORY NEGATIVE: 1
GASTROINTESTINAL NEGATIVE: 1

## 2024-03-25 NOTE — PROGRESS NOTES
Future  - Hemoglobin A1C; Future    4. Benign prostatic hyperplasia with urinary hesitancy  Status post aqua ablation.  He is not on any medications at this time.  We will check PSA  - PSA Screening; Future    5. Fatigue, unspecified type  Check thyroid with labs  - T4, Free; Future  - TSH; Future    6. Initial Medicare annual wellness visit  Routine age-appropriate anticipatory guidance was provided.  Annual wellness visit was performed in a separate note and issues were addressed as identified.         Orders Placed This Encounter   Procedures    CBC with Auto Differential    Comprehensive Metabolic Panel    Lipid Panel    Microalbumin / Creatinine Urine Ratio    PSA Screening    T4, Free    TSH    Hemoglobin A1C    Testosterone, free, total        Return in 1 year (on 3/25/2025).     This was an in-house visit            Medicare Annual Wellness Visit    David PADMINI Rios is here for Medicare AWV (Denies issues or concerns/[facility] - dexa scan - patient asking if he is due for one used to get one when he saw Dr Magdaleno )    Assessment & Plan   Initial Medicare annual wellness visit  Primary hypertension  -     CBC with Auto Differential; Future  -     Comprehensive Metabolic Panel; Future  -     Microalbumin / Creatinine Urine Ratio; Future  Mixed hyperlipidemia  -     Comprehensive Metabolic Panel; Future  -     Lipid Panel; Future  Prediabetes  -     Comprehensive Metabolic Panel; Future  -     Hemoglobin A1C; Future  Benign prostatic hyperplasia with urinary hesitancy  -     PSA Screening; Future  Fatigue, unspecified type  -     T4, Free; Future  -     TSH; Future  Low testosterone in male  -     Testosterone, free, total; Future    Recommendations for Preventive Services Due: see orders and patient instructions/AVS.  Recommended screening schedule for the next 5-10 years is provided to the patient in written form: see Patient Instructions/AVS.     Return in 1 year (on 3/25/2025).

## 2024-03-27 DIAGNOSIS — Z13.820 ENCOUNTER FOR SCREENING FOR OSTEOPOROSIS: Primary | ICD-10-CM

## 2024-03-28 DIAGNOSIS — N40.1 BENIGN PROSTATIC HYPERPLASIA WITH URINARY HESITANCY: ICD-10-CM

## 2024-03-28 DIAGNOSIS — E78.2 MIXED HYPERLIPIDEMIA: ICD-10-CM

## 2024-03-28 DIAGNOSIS — R53.83 FATIGUE, UNSPECIFIED TYPE: ICD-10-CM

## 2024-03-28 DIAGNOSIS — R79.89 LOW TESTOSTERONE IN MALE: ICD-10-CM

## 2024-03-28 DIAGNOSIS — I10 PRIMARY HYPERTENSION: ICD-10-CM

## 2024-03-28 DIAGNOSIS — R39.11 BENIGN PROSTATIC HYPERPLASIA WITH URINARY HESITANCY: ICD-10-CM

## 2024-03-28 DIAGNOSIS — R73.03 PREDIABETES: ICD-10-CM

## 2024-03-28 LAB
ALBUMIN SERPL-MCNC: 4.3 G/DL (ref 3.5–5.2)
ALP SERPL-CCNC: 79 U/L (ref 40–130)
ALT SERPL-CCNC: 13 U/L (ref 5–41)
ANION GAP SERPL CALCULATED.3IONS-SCNC: 15 MMOL/L (ref 7–19)
AST SERPL-CCNC: 23 U/L (ref 5–40)
BASOPHILS # BLD: 0.1 K/UL (ref 0–0.2)
BASOPHILS NFR BLD: 1.1 % (ref 0–1)
BILIRUB SERPL-MCNC: 0.8 MG/DL (ref 0.2–1.2)
BUN SERPL-MCNC: 17 MG/DL (ref 8–23)
CALCIUM SERPL-MCNC: 9.4 MG/DL (ref 8.8–10.2)
CHLORIDE SERPL-SCNC: 103 MMOL/L (ref 98–111)
CHOLEST SERPL-MCNC: 150 MG/DL (ref 160–199)
CO2 SERPL-SCNC: 23 MMOL/L (ref 22–29)
CREAT SERPL-MCNC: 1.3 MG/DL (ref 0.5–1.2)
CREAT UR-MCNC: 191.8 MG/DL (ref 39–259)
EOSINOPHIL # BLD: 0.2 K/UL (ref 0–0.6)
EOSINOPHIL NFR BLD: 4.2 % (ref 0–5)
ERYTHROCYTE [DISTWIDTH] IN BLOOD BY AUTOMATED COUNT: 13.6 % (ref 11.5–14.5)
GLUCOSE SERPL-MCNC: 100 MG/DL (ref 74–109)
HBA1C MFR BLD: 5.9 % (ref 4–6)
HCT VFR BLD AUTO: 43.7 % (ref 42–52)
HDLC SERPL-MCNC: 65 MG/DL (ref 55–121)
HGB BLD-MCNC: 14.5 G/DL (ref 14–18)
IMM GRANULOCYTES # BLD: 0 K/UL
LDLC SERPL CALC-MCNC: 73 MG/DL
LYMPHOCYTES # BLD: 1.7 K/UL (ref 1.1–4.5)
LYMPHOCYTES NFR BLD: 35.3 % (ref 20–40)
MCH RBC QN AUTO: 29.4 PG (ref 27–31)
MCHC RBC AUTO-ENTMCNC: 33.2 G/DL (ref 33–37)
MCV RBC AUTO: 88.5 FL (ref 80–94)
MICROALBUMIN UR-MCNC: 1.9 MG/DL (ref 0–19)
MICROALBUMIN/CREAT UR-RTO: 9.9 MG/G
MONOCYTES # BLD: 0.5 K/UL (ref 0–0.9)
MONOCYTES NFR BLD: 9.7 % (ref 0–10)
NEUTROPHILS # BLD: 2.4 K/UL (ref 1.5–7.5)
NEUTS SEG NFR BLD: 49.7 % (ref 50–65)
PLATELET # BLD AUTO: 182 K/UL (ref 130–400)
PMV BLD AUTO: 10.9 FL (ref 9.4–12.4)
POTASSIUM SERPL-SCNC: 5.1 MMOL/L (ref 3.5–5)
PROT SERPL-MCNC: 7.1 G/DL (ref 6.6–8.7)
PSA SERPL-MCNC: 2.2 NG/ML (ref 0–4)
RBC # BLD AUTO: 4.94 M/UL (ref 4.7–6.1)
SODIUM SERPL-SCNC: 141 MMOL/L (ref 136–145)
T4 FREE SERPL-MCNC: 1.35 NG/DL (ref 0.93–1.7)
TRIGL SERPL-MCNC: 58 MG/DL (ref 0–149)
TSH SERPL DL<=0.005 MIU/L-ACNC: 2.41 UIU/ML (ref 0.27–4.2)
WBC # BLD AUTO: 4.8 K/UL (ref 4.8–10.8)

## 2024-03-29 LAB
SHBG SERPL-SCNC: 44 NMOL/L (ref 19–76)
SHBG SERPL-SCNC: 54.4 PG/ML (ref 47–244)
TESTOST SERPL-MCNC: 329 NG/DL (ref 193–740)

## 2024-04-02 ENCOUNTER — TELEPHONE (OUTPATIENT)
Dept: FAMILY MEDICINE CLINIC | Age: 74
End: 2024-04-02

## 2024-04-02 NOTE — TELEPHONE ENCOUNTER
----- Message from JASMINE Garcia DO sent at 4/1/2024  5:56 PM CDT -----  Lipids are controlled.  Metabolic profile shows that kidney function has remained relatively stable.  Recommend try to increase water consumption to make it even easier on the kidneys.  Your WBC, (infection fighting ability) Hgb and Hct, (oxygen carrying cells) are normal; as is your percentage of each cell type.  Testosterone is in the normal range.  PSA is normal.  There is no significant protein excretion in the urine.  Thyroid is normal.  Hemoglobin A1c is 5.9 which indicates excellent blood sugar control over the past 3 months.

## 2024-04-02 NOTE — TELEPHONE ENCOUNTER
Called patient, spoke with: Spouse regarding the results of the patients most recent labs.  I advised Spouse of Dr. Garcia recommendations.   Spouse did voice understanding

## 2024-04-08 ENCOUNTER — TELEPHONE (OUTPATIENT)
Dept: FAMILY MEDICINE CLINIC | Age: 74
End: 2024-04-08

## 2024-04-08 DIAGNOSIS — Z13.820 ENCOUNTER FOR SCREENING FOR OSTEOPOROSIS: ICD-10-CM

## 2024-04-08 NOTE — TELEPHONE ENCOUNTER
----- Message from JASMINE Garcia DO sent at 4/8/2024  1:00 PM CDT -----  Bone density does show some mild thinning of the bones consistent with osteopenia.  Recommend calcium and vitamin D supplementation to prevent further bone loss.  Also recommend continuing with weightbearing exercise.

## 2024-04-08 NOTE — TELEPHONE ENCOUNTER
Called patient, spoke with: Spouse regarding the results of the patients most recent dexa.  I advised Spouse of Dr. Garcia recommendations.   Patient did voice understanding

## 2024-05-10 DIAGNOSIS — I10 PRIMARY HYPERTENSION: ICD-10-CM

## 2024-05-10 DIAGNOSIS — E78.2 MIXED HYPERLIPIDEMIA: ICD-10-CM

## 2024-05-10 NOTE — TELEPHONE ENCOUNTER
Received fax from pharmacy requesting refill on pts medication(s). Pt was last seen in office on 3/25/2024  and has a follow up scheduled for Visit date not found. Will send request to  Dr. Garcia  for patient.     Requested Prescriptions     Pending Prescriptions Disp Refills    atorvastatin (LIPITOR) 40 MG tablet [Pharmacy Med Name: ATORVASTATIN TABS 40MG] 90 tablet 3     Sig: Take 1 tablet by mouth daily    lisinopril (PRINIVIL;ZESTRIL) 20 MG tablet [Pharmacy Med Name: LISINOPRIL TABS 20MG] 90 tablet 3     Sig: Take 1 tablet by mouth daily

## 2024-05-12 RX ORDER — ATORVASTATIN CALCIUM 40 MG/1
40 TABLET, FILM COATED ORAL DAILY
Qty: 90 TABLET | Refills: 3 | Status: SHIPPED | OUTPATIENT
Start: 2024-05-12

## 2024-05-12 RX ORDER — LISINOPRIL 20 MG/1
20 TABLET ORAL DAILY
Qty: 90 TABLET | Refills: 3 | Status: SHIPPED | OUTPATIENT
Start: 2024-05-12

## 2024-08-27 ENCOUNTER — TELEPHONE (OUTPATIENT)
Dept: FAMILY MEDICINE CLINIC | Age: 74
End: 2024-08-27

## 2024-08-27 ENCOUNTER — OFFICE VISIT (OUTPATIENT)
Dept: FAMILY MEDICINE CLINIC | Age: 74
End: 2024-08-27
Payer: COMMERCIAL

## 2024-08-27 VITALS
SYSTOLIC BLOOD PRESSURE: 118 MMHG | TEMPERATURE: 97.1 F | BODY MASS INDEX: 28.71 KG/M2 | WEIGHT: 212 LBS | HEIGHT: 72 IN | DIASTOLIC BLOOD PRESSURE: 70 MMHG | OXYGEN SATURATION: 98 % | HEART RATE: 64 BPM

## 2024-08-27 DIAGNOSIS — R10.13 EPIGASTRIC ABDOMINAL PAIN: Primary | ICD-10-CM

## 2024-08-27 DIAGNOSIS — R11.0 NAUSEA: ICD-10-CM

## 2024-08-27 PROCEDURE — 3078F DIAST BP <80 MM HG: CPT | Performed by: NURSE PRACTITIONER

## 2024-08-27 PROCEDURE — 3074F SYST BP LT 130 MM HG: CPT | Performed by: NURSE PRACTITIONER

## 2024-08-27 PROCEDURE — G8427 DOCREV CUR MEDS BY ELIG CLIN: HCPCS | Performed by: NURSE PRACTITIONER

## 2024-08-27 PROCEDURE — 1123F ACP DISCUSS/DSCN MKR DOCD: CPT | Performed by: NURSE PRACTITIONER

## 2024-08-27 PROCEDURE — 99213 OFFICE O/P EST LOW 20 MIN: CPT | Performed by: NURSE PRACTITIONER

## 2024-08-27 PROCEDURE — 1036F TOBACCO NON-USER: CPT | Performed by: NURSE PRACTITIONER

## 2024-08-27 PROCEDURE — G8417 CALC BMI ABV UP PARAM F/U: HCPCS | Performed by: NURSE PRACTITIONER

## 2024-08-27 RX ORDER — FAMOTIDINE 20 MG/1
20 TABLET, FILM COATED ORAL 2 TIMES DAILY
Qty: 60 TABLET | Refills: 1 | Status: SHIPPED | OUTPATIENT
Start: 2024-08-27

## 2024-08-27 ASSESSMENT — ENCOUNTER SYMPTOMS
EYES NEGATIVE: 1
NAUSEA: 1
RESPIRATORY NEGATIVE: 1
ABDOMINAL PAIN: 1

## 2024-08-27 NOTE — PROGRESS NOTES
[facility]  [address] [place] [address]  Dept: [phone]  Dept Fax: [phone]  Loc: [phone]    David Rios is a 74 y.o. male who presents today for his medical conditions/complaints as noted below.  David Rios is c/o of Abdominal Pain      Chief Complaint   Patient presents with    Abdominal Pain       HPI:     HPI  Patient presents today with complaints of abd pain.  He states that he has some nausea with this as well.  Wife is present at bedside and states that it is his gallbladder.  She states that he has had 4 episode over the last couple of months.  Has had to take more tums than normal over the last couple of months. The recent episode patient had pizza and had a lot of abd pain in the epigastric region.  He reports not being able to lay flat due to the discomfort.      Past Medical History:   Diagnosis Date    Hyperlipidemia     Hypertension         Past Surgical History:   Procedure Laterality Date    LEG SURGERY Left     PROSTATE BIOPSY         Social History     Tobacco Use    Smoking status: Never    Smokeless tobacco: Never   Substance Use Topics    Alcohol use: Not Currently        Current Outpatient Medications   Medication Sig Dispense Refill    famotidine (PEPCID) 20 MG tablet Take 1 tablet by mouth 2 times daily 60 tablet 1    atorvastatin (LIPITOR) 40 MG tablet Take 1 tablet by mouth daily 90 tablet 3    lisinopril (PRINIVIL;ZESTRIL) 20 MG tablet Take 1 tablet by mouth daily 90 tablet 3    Misc Natural Products (PROSTATE HEALTH PO) Take 1 tablet by mouth daily      Cholecalciferol (VITAMIN D3) 125 MCG (5000 UT) CAPS Take 1 capsule by mouth daily      Lutein-Zeaxanthin 25-5 MG CAPS Take 1 tablet by mouth daily      Specialty Vitamins Products (BIOTIN PLUS KERATIN) 84523-339 MCG-MG TABS Take 1 tablet by mouth daily       No current facility-administered medications for this visit.       Allergies   Allergen Reactions

## 2024-08-27 NOTE — TELEPHONE ENCOUNTER
Pt having gallbladder issues. Pts wife states that Dr Gibbs is retiring and cannot get him in before he goes. They were advised to go to the ER cause pt is hurting right now she was told to get the testing done while pt is hurting. Pt doesn't want to go to the ER because it cost too much to go. Scheduled him apt for today in office.

## 2024-08-30 ENCOUNTER — HOSPITAL ENCOUNTER (OUTPATIENT)
Dept: GENERAL RADIOLOGY | Age: 74
Discharge: HOME | End: 2024-08-30
Payer: MEDICARE

## 2024-08-30 DIAGNOSIS — R10.13 EPIGASTRIC ABDOMINAL PAIN: ICD-10-CM

## 2024-08-30 PROCEDURE — 76705 ECHO EXAM OF ABDOMEN: CPT

## 2024-09-12 ENCOUNTER — TELEPHONE (OUTPATIENT)
Dept: FAMILY MEDICINE CLINIC | Age: 74
End: 2024-09-12

## 2024-10-25 ENCOUNTER — TELEPHONE (OUTPATIENT)
Dept: FAMILY MEDICINE CLINIC | Age: 74
End: 2024-10-25

## 2024-10-25 DIAGNOSIS — R91.1 NODULE OF LOWER LOBE OF RIGHT LUNG: Primary | ICD-10-CM

## 2024-10-25 DIAGNOSIS — Z12.2 SCREENING FOR LUNG CANCER: ICD-10-CM

## 2024-10-25 NOTE — TELEPHONE ENCOUNTER
David came in the office with a letter he received stating he was due for some testing regarding a lung nodule he has. He is wanting to discuss his next steps if he is needing this done, please contact the patient. I have scanned a copy of the letter into media.

## 2024-10-30 NOTE — TELEPHONE ENCOUNTER
Spoke to patient. He would like his CT scheduled at [facility] where he had it last year.

## 2024-10-30 NOTE — TELEPHONE ENCOUNTER
It does look like he had a CT scan performed at [facility], 10/23/2023 that showed a 5 mm nodule in the lower lobe.  We can follow this with a low-dose CT scan of the lungs.  This can be ordered at his institution of choice

## 2024-11-19 ENCOUNTER — TRANSCRIBE ORDERS (OUTPATIENT)
Dept: ADMINISTRATIVE | Facility: HOSPITAL | Age: 74
End: 2024-11-19
Payer: MEDICARE

## 2024-11-19 DIAGNOSIS — R91.1 NODULE OF LOWER LOBE OF RIGHT LUNG: Primary | ICD-10-CM

## 2024-11-19 DIAGNOSIS — Z12.2 SCREENING FOR LUNG CANCER: ICD-10-CM

## 2024-11-19 NOTE — PROGRESS NOTES
Insurance would not cover ct lung screening related to patient not being a smoker/former smoker.    Will order ct chest wo and send to [facility] per patient request.

## 2024-11-26 ENCOUNTER — HOSPITAL ENCOUNTER (OUTPATIENT)
Dept: CT IMAGING | Facility: HOSPITAL | Age: 74
Discharge: HOME | End: 2024-11-26
Admitting: PEDIATRICS
Payer: MEDICARE

## 2024-11-26 DIAGNOSIS — R91.8 LUNG NODULES: ICD-10-CM

## 2024-11-26 DIAGNOSIS — Z12.2 SCREENING FOR LUNG CANCER: ICD-10-CM

## 2024-11-26 DIAGNOSIS — R91.1 NODULE OF LOWER LOBE OF RIGHT LUNG: ICD-10-CM

## 2024-11-26 PROCEDURE — 71250 CT THORAX DX C-: CPT

## 2024-11-27 ENCOUNTER — TELEPHONE (OUTPATIENT)
Dept: FAMILY MEDICINE CLINIC | Age: 74
End: 2024-11-27

## 2024-11-27 NOTE — TELEPHONE ENCOUNTER
----- Message from Dr. JASMINE Garcia DO sent at 11/26/2024  8:04 PM CST -----  No suspicious findings suggestive of any pulmonary issues on CT scan.  Recommend repeat CT scan in 1 year

## 2024-11-27 NOTE — TELEPHONE ENCOUNTER
Called patient, spoke with: Patient regarding the results of the patients most recent CT Scan.  I advised Patient of Dr. Garcia recommendations.   Patient did voice understanding

## 2025-03-25 SDOH — ECONOMIC STABILITY: INCOME INSECURITY: IN THE LAST 12 MONTHS, WAS THERE A TIME WHEN YOU WERE NOT ABLE TO PAY THE MORTGAGE OR RENT ON TIME?: NO

## 2025-03-25 SDOH — ECONOMIC STABILITY: FOOD INSECURITY: WITHIN THE PAST 12 MONTHS, YOU WORRIED THAT YOUR FOOD WOULD RUN OUT BEFORE YOU GOT MONEY TO BUY MORE.: NEVER TRUE

## 2025-03-25 SDOH — HEALTH STABILITY: PHYSICAL HEALTH: ON AVERAGE, HOW MANY MINUTES DO YOU ENGAGE IN EXERCISE AT THIS LEVEL?: 80 MIN

## 2025-03-25 SDOH — ECONOMIC STABILITY: FOOD INSECURITY: WITHIN THE PAST 12 MONTHS, THE FOOD YOU BOUGHT JUST DIDN'T LAST AND YOU DIDN'T HAVE MONEY TO GET MORE.: NEVER TRUE

## 2025-03-25 SDOH — HEALTH STABILITY: PHYSICAL HEALTH: ON AVERAGE, HOW MANY DAYS PER WEEK DO YOU ENGAGE IN MODERATE TO STRENUOUS EXERCISE (LIKE A BRISK WALK)?: 2 DAYS

## 2025-03-25 SDOH — ECONOMIC STABILITY: TRANSPORTATION INSECURITY
IN THE PAST 12 MONTHS, HAS THE LACK OF TRANSPORTATION KEPT YOU FROM MEDICAL APPOINTMENTS OR FROM GETTING MEDICATIONS?: NO

## 2025-03-25 ASSESSMENT — PATIENT HEALTH QUESTIONNAIRE - PHQ9
1. LITTLE INTEREST OR PLEASURE IN DOING THINGS: NOT AT ALL
SUM OF ALL RESPONSES TO PHQ QUESTIONS 1-9: 0
2. FEELING DOWN, DEPRESSED OR HOPELESS: NOT AT ALL
SUM OF ALL RESPONSES TO PHQ QUESTIONS 1-9: 0

## 2025-03-25 ASSESSMENT — LIFESTYLE VARIABLES
HOW OFTEN DO YOU HAVE SIX OR MORE DRINKS ON ONE OCCASION: 1
HOW MANY STANDARD DRINKS CONTAINING ALCOHOL DO YOU HAVE ON A TYPICAL DAY: 1
HOW OFTEN DO YOU HAVE A DRINK CONTAINING ALCOHOL: 3
HOW MANY STANDARD DRINKS CONTAINING ALCOHOL DO YOU HAVE ON A TYPICAL DAY: 1 OR 2
HOW OFTEN DO YOU HAVE A DRINK CONTAINING ALCOHOL: 2-4 TIMES A MONTH

## 2025-03-27 ENCOUNTER — OFFICE VISIT (OUTPATIENT)
Age: 75
End: 2025-03-27

## 2025-03-27 VITALS
OXYGEN SATURATION: 98 % | HEART RATE: 75 BPM | DIASTOLIC BLOOD PRESSURE: 70 MMHG | SYSTOLIC BLOOD PRESSURE: 122 MMHG | TEMPERATURE: 97.3 F | HEIGHT: 72 IN | WEIGHT: 213 LBS | BODY MASS INDEX: 28.85 KG/M2

## 2025-03-27 DIAGNOSIS — R53.83 FATIGUE, UNSPECIFIED TYPE: ICD-10-CM

## 2025-03-27 DIAGNOSIS — E78.2 MIXED HYPERLIPIDEMIA: ICD-10-CM

## 2025-03-27 DIAGNOSIS — M15.0 PRIMARY OSTEOARTHRITIS INVOLVING MULTIPLE JOINTS: ICD-10-CM

## 2025-03-27 DIAGNOSIS — Z00.00 MEDICARE ANNUAL WELLNESS VISIT, SUBSEQUENT: Primary | ICD-10-CM

## 2025-03-27 DIAGNOSIS — N52.9 ED (ERECTILE DYSFUNCTION) OF ORGANIC ORIGIN: ICD-10-CM

## 2025-03-27 DIAGNOSIS — I10 PRIMARY HYPERTENSION: ICD-10-CM

## 2025-03-27 DIAGNOSIS — R39.11 BENIGN PROSTATIC HYPERPLASIA WITH URINARY HESITANCY: ICD-10-CM

## 2025-03-27 DIAGNOSIS — N40.1 BENIGN PROSTATIC HYPERPLASIA WITH URINARY HESITANCY: ICD-10-CM

## 2025-03-27 DIAGNOSIS — R73.03 PREDIABETES: ICD-10-CM

## 2025-03-27 DIAGNOSIS — Z12.5 ENCOUNTER FOR SPECIAL SCREENING EXAMINATION FOR NEOPLASM OF PROSTATE: ICD-10-CM

## 2025-03-27 DIAGNOSIS — K21.9 GASTROESOPHAGEAL REFLUX DISEASE, UNSPECIFIED WHETHER ESOPHAGITIS PRESENT: ICD-10-CM

## 2025-03-27 RX ORDER — ATORVASTATIN CALCIUM 40 MG/1
40 TABLET, FILM COATED ORAL DAILY
Qty: 90 TABLET | Refills: 3 | Status: SHIPPED | OUTPATIENT
Start: 2025-03-27

## 2025-03-27 RX ORDER — LISINOPRIL 20 MG/1
20 TABLET ORAL DAILY
Qty: 90 TABLET | Refills: 3 | Status: SHIPPED | OUTPATIENT
Start: 2025-03-27

## 2025-03-27 RX ORDER — MELOXICAM 15 MG/1
15 TABLET ORAL DAILY
Qty: 30 TABLET | Refills: 3 | Status: SHIPPED | OUTPATIENT
Start: 2025-03-27

## 2025-03-27 ASSESSMENT — PATIENT HEALTH QUESTIONNAIRE - PHQ9
SUM OF ALL RESPONSES TO PHQ QUESTIONS 1-9: 0
SUM OF ALL RESPONSES TO PHQ QUESTIONS 1-9: 0
2. FEELING DOWN, DEPRESSED OR HOPELESS: NOT AT ALL
1. LITTLE INTEREST OR PLEASURE IN DOING THINGS: NOT AT ALL
SUM OF ALL RESPONSES TO PHQ QUESTIONS 1-9: 0
SUM OF ALL RESPONSES TO PHQ QUESTIONS 1-9: 0

## 2025-03-27 ASSESSMENT — ENCOUNTER SYMPTOMS
EYES NEGATIVE: 1
ALLERGIC/IMMUNOLOGIC NEGATIVE: 1
GASTROINTESTINAL NEGATIVE: 1
RESPIRATORY NEGATIVE: 1

## 2025-03-27 NOTE — PROGRESS NOTES
David Rios (:  1950) is a 74 y.o. male, Established patient, here for evaluation of the following chief complaint(s):  Medicare AWV (1. Bilateral knee pain - left knee worse, has arthritis elsewhere )         Assessment & Plan  1. Bilateral knee pain: Acute.  - Perform exercises such as squats, leg lifts, wall sits, and cycling to strengthen knee muscles. Running is discouraged.  - Prescribe anti-inflammatory medication for 7 to 10 days to alleviate inflammation.  - Apply ice to the affected area post-exercise.  - Ensure adequate hydration while on medication.    2. Dysphagia.  - Monitor for progression of symptoms.  - If symptoms worsen or if there is bleeding, blood in stools, black tarry sticky stools, or vomit that looks like coffee grounds, consider EGD for further investigation.    3. Post-cholecystectomy status.  - Counseling on dietary adjustments.  - Monitor for any significant changes in bowel habits.    4. Medication management.  - Refills for atorvastatin 40 mg and lisinopril 20 mg will be sent to [facility].  - Continued adherence to low sodium and low fat diet is encouraged.    Follow-up  - Monitor for acid reflux or progression of dysphagia symptoms.    ASSESSMENT AND PLAN (FURTHER COMMENTS):  1. Primary hypertension  Blood pressure is appropriately controlled postmedication regimen.  Will continue the same  - CBC with Auto Differential; Future  - Comprehensive Metabolic Panel; Future  - Albumin/Creatinine Ratio, Urine; Future  - lisinopril (PRINIVIL;ZESTRIL) 20 MG tablet; Take 1 tablet by mouth daily  Dispense: 90 tablet; Refill: 3    2. Mixed hyperlipidemia  Recheck labs on therapy.  These have been well-controlled  - Comprehensive Metabolic Panel; Future  - Lipid Panel; Future  - atorvastatin (LIPITOR) 40 MG tablet; Take 1 tablet by mouth daily  Dispense: 90 tablet; Refill: 3    3. Prediabetes  Check hemoglobin A1c on follow-up  - Comprehensive Metabolic Panel; Future  -

## 2025-03-27 NOTE — PATIENT INSTRUCTIONS
Chest pain or pressure, or a strange feeling in the chest.     Sweating.     Shortness of breath.     Pain, pressure, or a strange feeling in the back, neck, jaw, or upper belly or in one or both shoulders or arms.     Lightheadedness or sudden weakness.     A fast or irregular heartbeat.   After you call 911, the  may tell you to chew 1 adult-strength or 2 to 4 low-dose aspirin. Wait for an ambulance. Do not try to drive yourself.  Watch closely for changes in your health, and be sure to contact your doctor if you have any problems.  Where can you learn more?  Go to https://www.[employer]net/patientEd and enter F075 to learn more about \"A Healthy Heart: Care Instructions.\"  Current as of: July 31, 2024  Content Version: 14.4  © 4021-3540 [employer].   Care instructions adapted under license by [employer]. If you have questions about a medical condition or this instruction, always ask your healthcare professional. [employer] disclaims any warranty or liability for your use of this information.    Personalized Preventive Plan for David Rios - 3/27/2025  Medicare offers a range of preventive health benefits. Some of the tests and screenings are paid in full while other may be subject to a deductible, co-insurance, and/or copay.  Some of these benefits include a comprehensive review of your medical history including lifestyle, illnesses that may run in your family, and various assessments and screenings as appropriate.  After reviewing your medical record and screening and assessments performed today your provider may have ordered immunizations, labs, imaging, and/or referrals for you.  A list of these orders (if applicable) as well as your Preventive Care list are included within your After Visit Summary for your review.

## 2025-03-31 ENCOUNTER — TELEPHONE (OUTPATIENT)
Age: 75
End: 2025-03-31

## 2025-03-31 NOTE — TELEPHONE ENCOUNTER
----- Message from Dr. JASMINE Garcia, DO sent at 3/27/2025  2:52 PM CDT -----  Can we get a copy of RUQ US done at [facility] late September please.

## 2025-04-10 DIAGNOSIS — Z00.00 MEDICARE ANNUAL WELLNESS VISIT, SUBSEQUENT: ICD-10-CM

## 2025-04-10 DIAGNOSIS — I10 PRIMARY HYPERTENSION: ICD-10-CM

## 2025-04-10 DIAGNOSIS — N40.1 BENIGN PROSTATIC HYPERPLASIA WITH URINARY HESITANCY: ICD-10-CM

## 2025-04-10 DIAGNOSIS — R39.11 BENIGN PROSTATIC HYPERPLASIA WITH URINARY HESITANCY: ICD-10-CM

## 2025-04-10 DIAGNOSIS — N52.9 ED (ERECTILE DYSFUNCTION) OF ORGANIC ORIGIN: ICD-10-CM

## 2025-04-10 DIAGNOSIS — R73.03 PREDIABETES: ICD-10-CM

## 2025-04-10 DIAGNOSIS — E78.2 MIXED HYPERLIPIDEMIA: ICD-10-CM

## 2025-04-10 DIAGNOSIS — Z12.5 ENCOUNTER FOR SPECIAL SCREENING EXAMINATION FOR NEOPLASM OF PROSTATE: ICD-10-CM

## 2025-04-10 LAB
ALBUMIN SERPL-MCNC: 4.1 G/DL (ref 3.5–5.2)
ALP SERPL-CCNC: 82 U/L (ref 40–129)
ALT SERPL-CCNC: 18 U/L (ref 10–50)
ANION GAP SERPL CALCULATED.3IONS-SCNC: 9 MMOL/L (ref 8–16)
AST SERPL-CCNC: 27 U/L (ref 10–50)
BASOPHILS # BLD: 0.1 K/UL (ref 0–0.2)
BASOPHILS NFR BLD: 1.3 % (ref 0–1)
BILIRUB SERPL-MCNC: 0.4 MG/DL (ref 0.2–1.2)
BUN SERPL-MCNC: 24 MG/DL (ref 8–23)
CALCIUM SERPL-MCNC: 9.3 MG/DL (ref 8.8–10.2)
CHLORIDE SERPL-SCNC: 106 MMOL/L (ref 98–107)
CHOLEST SERPL-MCNC: 146 MG/DL (ref 0–199)
CO2 SERPL-SCNC: 26 MMOL/L (ref 22–29)
CREAT SERPL-MCNC: 1.2 MG/DL (ref 0.7–1.2)
CREAT UR-MCNC: 220 MG/DL (ref 39–259)
EOSINOPHIL # BLD: 0.3 K/UL (ref 0–0.6)
EOSINOPHIL NFR BLD: 5.8 % (ref 0–5)
ERYTHROCYTE [DISTWIDTH] IN BLOOD BY AUTOMATED COUNT: 13.6 % (ref 11.5–14.5)
GLUCOSE SERPL-MCNC: 98 MG/DL (ref 70–99)
HBA1C MFR BLD: 6 % (ref 4–5.6)
HCT VFR BLD AUTO: 41.7 % (ref 42–52)
HDLC SERPL-MCNC: 60 MG/DL (ref 40–60)
HGB BLD-MCNC: 13.3 G/DL (ref 14–18)
IMM GRANULOCYTES # BLD: 0 K/UL
LDLC SERPL CALC-MCNC: 69 MG/DL
LYMPHOCYTES # BLD: 1.6 K/UL (ref 1.1–4.5)
LYMPHOCYTES NFR BLD: 30.1 % (ref 20–40)
MCH RBC QN AUTO: 29.4 PG (ref 27–31)
MCHC RBC AUTO-ENTMCNC: 31.9 G/DL (ref 33–37)
MCV RBC AUTO: 92.3 FL (ref 80–94)
MICROALBUMIN UR-MCNC: <1.2 MG/DL (ref 0–1.99)
MICROALBUMIN/CREAT UR-RTO: NORMAL MG/G (ref 0–29)
MONOCYTES # BLD: 0.5 K/UL (ref 0–0.9)
MONOCYTES NFR BLD: 10.2 % (ref 0–10)
NEUTROPHILS # BLD: 2.8 K/UL (ref 1.5–7.5)
NEUTS SEG NFR BLD: 52.2 % (ref 50–65)
PLATELET # BLD AUTO: 180 K/UL (ref 130–400)
PMV BLD AUTO: 11.6 FL (ref 9.4–12.4)
POTASSIUM SERPL-SCNC: 4.7 MMOL/L (ref 3.5–5.1)
PROT SERPL-MCNC: 6.6 G/DL (ref 6.4–8.3)
PSA SERPL-MCNC: 2.13 NG/ML (ref 0–4)
RBC # BLD AUTO: 4.52 M/UL (ref 4.7–6.1)
SODIUM SERPL-SCNC: 141 MMOL/L (ref 136–145)
T4 FREE SERPL-MCNC: 1.33 NG/DL (ref 0.93–1.7)
TRIGL SERPL-MCNC: 87 MG/DL (ref 0–149)
TSH SERPL DL<=0.005 MIU/L-ACNC: 2.75 UIU/ML (ref 0.27–4.2)
WBC # BLD AUTO: 5.3 K/UL (ref 4.8–10.8)

## 2025-04-11 ENCOUNTER — RESULTS FOLLOW-UP (OUTPATIENT)
Age: 75
End: 2025-04-11

## (undated) DEVICE — 3M™ IOBAN™ 2 ANTIMICROBIAL INCISE DRAPE 6650EZ: Brand: IOBAN™ 2

## (undated) DEVICE — BHS TURNOVER CYSTO: Brand: MEDLINE INDUSTRIES, INC.

## (undated) DEVICE — SPNG GZ WOVN 4X4IN 12PLY 10/BX STRL

## (undated) DEVICE — PK TURNOVER RM ADV

## (undated) DEVICE — TOWEL,OR,DSP,ST,BLUE,STD,4/PK,20PK/CS: Brand: MEDLINE

## (undated) DEVICE — Device

## (undated) DEVICE — 3M™ WARMING BLANKET, UPPER BODY, 10 PER CASE, 42268: Brand: BAIR HUGGER™

## (undated) DEVICE — HLDR PROB URONAV

## (undated) DEVICE — GLV SURG BIOGEL LTX PF 8

## (undated) DEVICE — TUBING, SUCTION, 1/4" X 12', STRAIGHT: Brand: MEDLINE

## (undated) DEVICE — DRAPE,U/ SHT,SPLIT,PLAS,STERIL: Brand: MEDLINE

## (undated) DEVICE — BLD FEM FIX HELI TFN ADV 105MM STRL
Type: IMPLANTABLE DEVICE | Status: NON-FUNCTIONAL
Removed: 2018-05-21

## (undated) DEVICE — ANTIBACTERIAL UNDYED BRAIDED (POLYGLACTIN 910), SYNTHETIC ABSORBABLE SUTURE: Brand: COATED VICRYL

## (undated) DEVICE — EVAC BLDR UROVAC W ADAPT

## (undated) DEVICE — CVR UNIV C/ARM

## (undated) DEVICE — MAX-CORE® DISPOSABLE CORE BIOPSY INSTRUMENT, 18G X 25CM: Brand: MAX-CORE

## (undated) DEVICE — DRSNG GZ CURAD XEROFORM NONADHS 5X9IN STRL

## (undated) DEVICE — BIT DRL 3FLUT QC NDL PT 4.2X145MM

## (undated) DEVICE — SYRINGE,PISTON,IRRIGATION,60ML,STERILE: Brand: MEDLINE

## (undated) DEVICE — Device: Brand: AQUABEAM HANDPIECE

## (undated) DEVICE — 4-PORT MANIFOLD: Brand: NEPTUNE 2

## (undated) DEVICE — DISPOSABLE TOURNIQUET CUFF SINGLE BLADDER, SINGLE PORT AND QUICK CONNECT CONNECTOR: Brand: COLOR CUFF

## (undated) DEVICE — PROXIMATE RH ROTATING HEAD SKIN STAPLERS (35 WIDE) CONTAINS 35 STAINLESS STEEL STAPLES: Brand: PROXIMATE

## (undated) DEVICE — PK DRP AQUABEAM LITHO 65X69IN

## (undated) DEVICE — GLV SURG BIOGEL M LTX PF 7 1/2

## (undated) DEVICE — GLV SURG TRIUMPH GREEN W/ALOE PF LTX 8 STRL

## (undated) DEVICE — MARKR SKIN W/RULR AND LBL

## (undated) DEVICE — GW FOR TROCH NAIL 3.2X400MM

## (undated) DEVICE — PK CYSTO 30

## (undated) DEVICE — PK EXTRM 30

## (undated) DEVICE — COVER,MAYO STAND,STERILE: Brand: MEDLINE

## (undated) DEVICE — BAG,DRAINAGE,4L,A/R TOWER,LL,SLIDE TAP: Brand: MEDLINE

## (undated) DEVICE — ELECTRD SUPERSECT FRNT LOAD 5PK

## (undated) DEVICE — DOVER HYDROGEL COATED LATEX FOLEY CATHETER, 30 ML, 3-WAY 24 FR/CH (8.0 MM): Brand: DOVER

## (undated) DEVICE — BNDG ESMARK STRL 6INX12FT LF

## (undated) DEVICE — DRSNG WND SIL OPTIFOAM GENTLE BRDR ADHS W/SA 4X4IN